# Patient Record
Sex: MALE | Race: WHITE | NOT HISPANIC OR LATINO | Employment: OTHER | URBAN - METROPOLITAN AREA
[De-identification: names, ages, dates, MRNs, and addresses within clinical notes are randomized per-mention and may not be internally consistent; named-entity substitution may affect disease eponyms.]

---

## 2024-09-03 ENCOUNTER — APPOINTMENT (OUTPATIENT)
Dept: RADIOLOGY | Facility: MEDICAL CENTER | Age: 84
DRG: 166 | End: 2024-09-03
Attending: EMERGENCY MEDICINE
Payer: MEDICARE

## 2024-09-03 ENCOUNTER — HOSPITAL ENCOUNTER (INPATIENT)
Facility: MEDICAL CENTER | Age: 84
LOS: 12 days | DRG: 166 | End: 2024-09-15
Attending: EMERGENCY MEDICINE | Admitting: HOSPITALIST
Payer: MEDICARE

## 2024-09-03 ENCOUNTER — HOSPITAL ENCOUNTER (OUTPATIENT)
Dept: RADIOLOGY | Facility: MEDICAL CENTER | Age: 84
End: 2024-09-03
Attending: STUDENT IN AN ORGANIZED HEALTH CARE EDUCATION/TRAINING PROGRAM
Payer: MEDICARE

## 2024-09-03 DIAGNOSIS — E86.0 DEHYDRATION: ICD-10-CM

## 2024-09-03 DIAGNOSIS — J93.11 PRIMARY SPONTANEOUS PNEUMOTHORAX: ICD-10-CM

## 2024-09-03 DIAGNOSIS — J43.9 PULMONARY EMPHYSEMA, UNSPECIFIED EMPHYSEMA TYPE (HCC): ICD-10-CM

## 2024-09-03 DIAGNOSIS — R73.9 HYPERGLYCEMIA: ICD-10-CM

## 2024-09-03 DIAGNOSIS — J93.9 PNEUMOTHORAX, UNSPECIFIED TYPE: ICD-10-CM

## 2024-09-03 DIAGNOSIS — R74.8 ELEVATED ALKALINE PHOSPHATASE LEVEL: ICD-10-CM

## 2024-09-03 DIAGNOSIS — J43.8 OTHER EMPHYSEMA (HCC): ICD-10-CM

## 2024-09-03 PROBLEM — E87.6 HYPOKALEMIA: Status: ACTIVE | Noted: 2024-09-03

## 2024-09-03 PROBLEM — J44.9 COPD (CHRONIC OBSTRUCTIVE PULMONARY DISEASE) (HCC): Status: ACTIVE | Noted: 2024-09-03

## 2024-09-03 PROBLEM — T14.90XA TRAUMA: Status: ACTIVE | Noted: 2024-09-03

## 2024-09-03 PROBLEM — D72.829 LEUKOCYTOSIS: Status: ACTIVE | Noted: 2024-09-03

## 2024-09-03 LAB
ALBUMIN SERPL BCP-MCNC: 4.1 G/DL (ref 3.2–4.9)
ALBUMIN/GLOB SERPL: 1.3 G/DL
ALP SERPL-CCNC: 178 U/L (ref 30–99)
ALT SERPL-CCNC: 14 U/L (ref 2–50)
ANION GAP SERPL CALC-SCNC: 14 MMOL/L (ref 7–16)
AST SERPL-CCNC: 21 U/L (ref 12–45)
BASOPHILS # BLD AUTO: 0.5 % (ref 0–1.8)
BASOPHILS # BLD: 0.11 K/UL (ref 0–0.12)
BILIRUB SERPL-MCNC: 0.4 MG/DL (ref 0.1–1.5)
BUN SERPL-MCNC: 12 MG/DL (ref 8–22)
CALCIUM ALBUM COR SERPL-MCNC: 8.5 MG/DL (ref 8.5–10.5)
CALCIUM SERPL-MCNC: 8.6 MG/DL (ref 8.5–10.5)
CHLORIDE SERPL-SCNC: 102 MMOL/L (ref 96–112)
CO2 SERPL-SCNC: 23 MMOL/L (ref 20–33)
CREAT SERPL-MCNC: 0.59 MG/DL (ref 0.5–1.4)
EOSINOPHIL # BLD AUTO: 0.07 K/UL (ref 0–0.51)
EOSINOPHIL NFR BLD: 0.3 % (ref 0–6.9)
ERYTHROCYTE [DISTWIDTH] IN BLOOD BY AUTOMATED COUNT: 49.3 FL (ref 35.9–50)
GFR SERPLBLD CREATININE-BSD FMLA CKD-EPI: 96 ML/MIN/1.73 M 2
GLOBULIN SER CALC-MCNC: 3.2 G/DL (ref 1.9–3.5)
GLUCOSE SERPL-MCNC: 178 MG/DL (ref 65–99)
HCT VFR BLD AUTO: 46 % (ref 42–52)
HGB BLD-MCNC: 14.9 G/DL (ref 14–18)
IMM GRANULOCYTES # BLD AUTO: 0.23 K/UL (ref 0–0.11)
IMM GRANULOCYTES NFR BLD AUTO: 1.1 % (ref 0–0.9)
LYMPHOCYTES # BLD AUTO: 2.89 K/UL (ref 1–4.8)
LYMPHOCYTES NFR BLD: 14 % (ref 22–41)
MCH RBC QN AUTO: 32.1 PG (ref 27–33)
MCHC RBC AUTO-ENTMCNC: 32.4 G/DL (ref 32.3–36.5)
MCV RBC AUTO: 99.1 FL (ref 81.4–97.8)
MONOCYTES # BLD AUTO: 0.6 K/UL (ref 0–0.85)
MONOCYTES NFR BLD AUTO: 2.9 % (ref 0–13.4)
NEUTROPHILS # BLD AUTO: 16.81 K/UL (ref 1.82–7.42)
NEUTROPHILS NFR BLD: 81.2 % (ref 44–72)
NRBC # BLD AUTO: 0 K/UL
NRBC BLD-RTO: 0 /100 WBC (ref 0–0.2)
PLATELET # BLD AUTO: 261 K/UL (ref 164–446)
PMV BLD AUTO: 10 FL (ref 9–12.9)
POTASSIUM SERPL-SCNC: 3.5 MMOL/L (ref 3.6–5.5)
PROCALCITONIN SERPL-MCNC: 7.28 NG/ML
PROT SERPL-MCNC: 7.3 G/DL (ref 6–8.2)
RBC # BLD AUTO: 4.64 M/UL (ref 4.7–6.1)
SODIUM SERPL-SCNC: 139 MMOL/L (ref 135–145)
TROPONIN T SERPL-MCNC: 19 NG/L (ref 6–19)
WBC # BLD AUTO: 20.7 K/UL (ref 4.8–10.8)

## 2024-09-03 PROCEDURE — 0W9930Z DRAINAGE OF RIGHT PLEURAL CAVITY WITH DRAINAGE DEVICE, PERCUTANEOUS APPROACH: ICD-10-PCS | Performed by: SURGERY

## 2024-09-03 PROCEDURE — 36415 COLL VENOUS BLD VENIPUNCTURE: CPT

## 2024-09-03 PROCEDURE — 770001 HCHG ROOM/CARE - MED/SURG/GYN PRIV*

## 2024-09-03 PROCEDURE — 71045 X-RAY EXAM CHEST 1 VIEW: CPT

## 2024-09-03 PROCEDURE — 3E0T3BZ INTRODUCTION OF ANESTHETIC AGENT INTO PERIPHERAL NERVES AND PLEXI, PERCUTANEOUS APPROACH: ICD-10-PCS | Performed by: SURGERY

## 2024-09-03 PROCEDURE — 700102 HCHG RX REV CODE 250 W/ 637 OVERRIDE(OP): Performed by: HOSPITALIST

## 2024-09-03 PROCEDURE — A9270 NON-COVERED ITEM OR SERVICE: HCPCS | Performed by: HOSPITALIST

## 2024-09-03 PROCEDURE — 32551 INSERTION OF CHEST TUBE: CPT

## 2024-09-03 PROCEDURE — 99285 EMERGENCY DEPT VISIT HI MDM: CPT

## 2024-09-03 PROCEDURE — 84484 ASSAY OF TROPONIN QUANT: CPT

## 2024-09-03 PROCEDURE — 700101 HCHG RX REV CODE 250: Performed by: EMERGENCY MEDICINE

## 2024-09-03 PROCEDURE — 32551 INSERTION OF CHEST TUBE: CPT | Mod: RT | Performed by: SURGERY

## 2024-09-03 PROCEDURE — 96375 TX/PRO/DX INJ NEW DRUG ADDON: CPT

## 2024-09-03 PROCEDURE — 99223 1ST HOSP IP/OBS HIGH 75: CPT | Mod: AI | Performed by: HOSPITALIST

## 2024-09-03 PROCEDURE — 700117 HCHG RX CONTRAST REV CODE 255: Performed by: EMERGENCY MEDICINE

## 2024-09-03 PROCEDURE — 71260 CT THORAX DX C+: CPT

## 2024-09-03 PROCEDURE — 80053 COMPREHEN METABOLIC PANEL: CPT

## 2024-09-03 PROCEDURE — 85025 COMPLETE CBC W/AUTO DIFF WBC: CPT

## 2024-09-03 PROCEDURE — 96374 THER/PROPH/DIAG INJ IV PUSH: CPT

## 2024-09-03 PROCEDURE — 84145 PROCALCITONIN (PCT): CPT

## 2024-09-03 PROCEDURE — 700111 HCHG RX REV CODE 636 W/ 250 OVERRIDE (IP): Performed by: SURGERY

## 2024-09-03 RX ORDER — OXYCODONE HYDROCHLORIDE 5 MG/1
5 TABLET ORAL
Status: DISCONTINUED | OUTPATIENT
Start: 2024-09-03 | End: 2024-09-04

## 2024-09-03 RX ORDER — ONDANSETRON 2 MG/ML
4 INJECTION INTRAMUSCULAR; INTRAVENOUS EVERY 4 HOURS PRN
Status: DISCONTINUED | OUTPATIENT
Start: 2024-09-03 | End: 2024-09-15 | Stop reason: HOSPADM

## 2024-09-03 RX ORDER — KETOROLAC TROMETHAMINE 5 MG/ML
1 SOLUTION OPHTHALMIC DAILY
Status: DISCONTINUED | OUTPATIENT
Start: 2024-09-04 | End: 2024-09-15 | Stop reason: HOSPADM

## 2024-09-03 RX ORDER — PREDNISOLONE ACETATE 10 MG/ML
1 SUSPENSION/ DROPS OPHTHALMIC
Status: ON HOLD | COMMUNITY
End: 2024-09-15

## 2024-09-03 RX ORDER — AMOXICILLIN 250 MG
2 CAPSULE ORAL EVERY EVENING
Status: DISCONTINUED | OUTPATIENT
Start: 2024-09-03 | End: 2024-09-15 | Stop reason: HOSPADM

## 2024-09-03 RX ORDER — PREDNISOLONE ACETATE 10 MG/ML
1 SUSPENSION/ DROPS OPHTHALMIC DAILY
Status: DISCONTINUED | OUTPATIENT
Start: 2024-09-04 | End: 2024-09-15 | Stop reason: HOSPADM

## 2024-09-03 RX ORDER — OXYCODONE HYDROCHLORIDE 5 MG/1
2.5 TABLET ORAL
Status: DISCONTINUED | OUTPATIENT
Start: 2024-09-03 | End: 2024-09-04

## 2024-09-03 RX ORDER — LIDOCAINE HYDROCHLORIDE AND EPINEPHRINE BITARTRATE 20; .01 MG/ML; MG/ML
10 INJECTION, SOLUTION SUBCUTANEOUS ONCE
Status: COMPLETED | OUTPATIENT
Start: 2024-09-03 | End: 2024-09-03

## 2024-09-03 RX ORDER — POLYETHYLENE GLYCOL 3350 17 G/17G
1 POWDER, FOR SOLUTION ORAL
Status: DISCONTINUED | OUTPATIENT
Start: 2024-09-03 | End: 2024-09-15 | Stop reason: HOSPADM

## 2024-09-03 RX ORDER — MORPHINE SULFATE 4 MG/ML
2 INJECTION INTRAVENOUS
Status: DISCONTINUED | OUTPATIENT
Start: 2024-09-03 | End: 2024-09-04

## 2024-09-03 RX ORDER — MIDAZOLAM HYDROCHLORIDE 1 MG/ML
INJECTION INTRAMUSCULAR; INTRAVENOUS
Status: DISPENSED
Start: 2024-09-03 | End: 2024-09-04

## 2024-09-03 RX ORDER — HYDRALAZINE HYDROCHLORIDE 20 MG/ML
10 INJECTION INTRAMUSCULAR; INTRAVENOUS EVERY 4 HOURS PRN
Status: DISCONTINUED | OUTPATIENT
Start: 2024-09-03 | End: 2024-09-15 | Stop reason: HOSPADM

## 2024-09-03 RX ORDER — MIDAZOLAM HYDROCHLORIDE 1 MG/ML
2 INJECTION INTRAMUSCULAR; INTRAVENOUS ONCE
Status: COMPLETED | OUTPATIENT
Start: 2024-09-03 | End: 2024-09-03

## 2024-09-03 RX ORDER — KETOROLAC TROMETHAMINE 5 MG/ML
1 SOLUTION OPHTHALMIC
Status: ON HOLD | COMMUNITY
End: 2024-09-15

## 2024-09-03 RX ORDER — ONDANSETRON 4 MG/1
4 TABLET, ORALLY DISINTEGRATING ORAL EVERY 4 HOURS PRN
Status: DISCONTINUED | OUTPATIENT
Start: 2024-09-03 | End: 2024-09-15 | Stop reason: HOSPADM

## 2024-09-03 RX ORDER — ACETAMINOPHEN 325 MG/1
650 TABLET ORAL EVERY 6 HOURS PRN
Status: DISCONTINUED | OUTPATIENT
Start: 2024-09-03 | End: 2024-09-09

## 2024-09-03 RX ORDER — POTASSIUM CHLORIDE 1500 MG/1
40 TABLET, EXTENDED RELEASE ORAL ONCE
Status: COMPLETED | OUTPATIENT
Start: 2024-09-03 | End: 2024-09-03

## 2024-09-03 RX ADMIN — IOHEXOL 80 ML: 350 INJECTION, SOLUTION INTRAVENOUS at 15:03

## 2024-09-03 RX ADMIN — SENNOSIDES AND DOCUSATE SODIUM 2 TABLET: 50; 8.6 TABLET ORAL at 19:57

## 2024-09-03 RX ADMIN — FENTANYL CITRATE 25 MCG: 50 INJECTION, SOLUTION INTRAMUSCULAR; INTRAVENOUS at 12:25

## 2024-09-03 RX ADMIN — RIVAROXABAN 10 MG: 10 TABLET, FILM COATED ORAL at 19:57

## 2024-09-03 RX ADMIN — LIDOCAINE HYDROCHLORIDE,EPINEPHRINE BITARTRATE 5 ML: 20; .01 INJECTION, SOLUTION INFILTRATION; PERINEURAL at 12:15

## 2024-09-03 RX ADMIN — POTASSIUM CHLORIDE 40 MEQ: 1500 TABLET, EXTENDED RELEASE ORAL at 19:57

## 2024-09-03 RX ADMIN — MIDAZOLAM HYDROCHLORIDE 2 MG: 2 INJECTION, SOLUTION INTRAMUSCULAR; INTRAVENOUS at 12:25

## 2024-09-03 ASSESSMENT — LIFESTYLE VARIABLES
TOTAL SCORE: 0
HAVE YOU EVER FELT YOU SHOULD CUT DOWN ON YOUR DRINKING: NO
CONSUMPTION TOTAL: NEGATIVE
ON A TYPICAL DAY WHEN YOU DRINK ALCOHOL HOW MANY DRINKS DO YOU HAVE: 0
DOES PATIENT WANT TO STOP DRINKING: NO
EVER HAD A DRINK FIRST THING IN THE MORNING TO STEADY YOUR NERVES TO GET RID OF A HANGOVER: NO
HOW MANY TIMES IN THE PAST YEAR HAVE YOU HAD 5 OR MORE DRINKS IN A DAY: 0
HAVE PEOPLE ANNOYED YOU BY CRITICIZING YOUR DRINKING: NO
ALCOHOL_USE: NO
AVERAGE NUMBER OF DAYS PER WEEK YOU HAVE A DRINK CONTAINING ALCOHOL: 0
EVER FELT BAD OR GUILTY ABOUT YOUR DRINKING: NO

## 2024-09-03 ASSESSMENT — PAIN DESCRIPTION - PAIN TYPE
TYPE: ACUTE PAIN
TYPE: ACUTE PAIN

## 2024-09-03 ASSESSMENT — COGNITIVE AND FUNCTIONAL STATUS - GENERAL
MOBILITY SCORE: 22
MOVING TO AND FROM BED TO CHAIR: A LITTLE
SUGGESTED CMS G CODE MODIFIER DAILY ACTIVITY: CH
SUGGESTED CMS G CODE MODIFIER MOBILITY: CJ
WALKING IN HOSPITAL ROOM: A LITTLE
DAILY ACTIVITIY SCORE: 24

## 2024-09-03 ASSESSMENT — SOCIAL DETERMINANTS OF HEALTH (SDOH)
WITHIN THE LAST YEAR, HAVE YOU BEEN KICKED, HIT, SLAPPED, OR OTHERWISE PHYSICALLY HURT BY YOUR PARTNER OR EX-PARTNER?: PATIENT DECLINED
WITHIN THE LAST YEAR, HAVE TO BEEN RAPED OR FORCED TO HAVE ANY KIND OF SEXUAL ACTIVITY BY YOUR PARTNER OR EX-PARTNER?: PATIENT DECLINED
WITHIN THE LAST YEAR, HAVE YOU BEEN AFRAID OF YOUR PARTNER OR EX-PARTNER?: PATIENT DECLINED
WITHIN THE LAST YEAR, HAVE YOU BEEN HUMILIATED OR EMOTIONALLY ABUSED IN OTHER WAYS BY YOUR PARTNER OR EX-PARTNER?: PATIENT DECLINED

## 2024-09-03 ASSESSMENT — ENCOUNTER SYMPTOMS
DIZZINESS: 0
FEVER: 0
HEMOPTYSIS: 0
SORE THROAT: 0
WEIGHT LOSS: 0
SPUTUM PRODUCTION: 0
NERVOUS/ANXIOUS: 0
BLOOD IN STOOL: 0
COUGH: 0
DIARRHEA: 0
SHORTNESS OF BREATH: 1
PALPITATIONS: 0
BACK PAIN: 1
NAUSEA: 0

## 2024-09-03 ASSESSMENT — PATIENT HEALTH QUESTIONNAIRE - PHQ9
2. FEELING DOWN, DEPRESSED, IRRITABLE, OR HOPELESS: NOT AT ALL
1. LITTLE INTEREST OR PLEASURE IN DOING THINGS: NOT AT ALL
SUM OF ALL RESPONSES TO PHQ9 QUESTIONS 1 AND 2: 0

## 2024-09-03 NOTE — ED NOTES
Pt assisted to standing voided by urinal. Chest tube with scant blood in drainage tube. Air leak no longer present. Pt o2 95 4l nc

## 2024-09-03 NOTE — ED TRIAGE NOTES
.  Chief Complaint   Patient presents with    Shortness of Breath    Respiratory Distress     Pt transferred from [facility]. Via macias born ems. Pt on arrival increased wob, unable to answer questions d/t work of breathing. O2 98% 15l nrb. ERP paged to room.    Sweta from [facility] Pre-Surgical testing following up on device form that was faxed over regarding patient's surgery for tomorrow 4/5/22.     Contact Info:   Sweta   [facility] Pre-Surgical Testing   [phone]  Fax:[phone]

## 2024-09-03 NOTE — ED NOTES
Med Rec complete per patient with family at bedside   Allergies reviewed  Antibiotics in the past 30 days:no  Anticoagulant in past 14 days:no  Pharmacy patient utilizes:[facility] in [place]    Pt unable to verify names of eye drops    Per pharmacy the only medications filled for patient was on December of 2023 for Ketoralac 0.5% (1 gtt TID) and Prednisolone 1% (1 gtt Q8H prior to surgery)    Notified [facility]

## 2024-09-03 NOTE — ED TRIAGE NOTES
X ray to bedside erp to place emergent needle decompression to right chest 5th intercostal axilla xray to bedside. Additional decompression to just above space and repeat xray done

## 2024-09-03 NOTE — ED NOTES
Needle decompression to right chest mud clavicular second inter costal. Erp hand pulled 100 ml air

## 2024-09-03 NOTE — ASSESSMENT & PLAN NOTE
Large spontaneous pneumothorax   Attempted decompression in ED.  Chest tube placed in ED. (+) air leak noted/no drainage. Placed to suction.  9/4 CXR with no pneumothorax.  CT to suction with no air leak.  Continue chest tube to suction.    9/5 OR for thoracoscopic talc and abrasive pleurodesis. Bleb resection not indicated.   9/9 Chest tube to water seal. No pneumothorax on morning CXR.  9/10 Small 9mm pneumothorax.  -Place chest tube back to suction.   -PEP therapy.  9/12 Chest tube to water seal.   9/14 Chest tube removed.   - AM CXR for medical clearance to discharge.   Trauma service will sign off.

## 2024-09-04 ENCOUNTER — APPOINTMENT (OUTPATIENT)
Dept: RADIOLOGY | Facility: MEDICAL CENTER | Age: 84
DRG: 166 | End: 2024-09-04
Attending: HOSPITALIST
Payer: MEDICARE

## 2024-09-04 ENCOUNTER — ANESTHESIA EVENT (OUTPATIENT)
Dept: SURGERY | Facility: MEDICAL CENTER | Age: 84
DRG: 166 | End: 2024-09-04
Payer: MEDICARE

## 2024-09-04 PROBLEM — E86.0 DEHYDRATION: Status: ACTIVE | Noted: 2024-09-04

## 2024-09-04 LAB
ANION GAP SERPL CALC-SCNC: 11 MMOL/L (ref 7–16)
BUN SERPL-MCNC: 11 MG/DL (ref 8–22)
CALCIUM SERPL-MCNC: 9.4 MG/DL (ref 8.5–10.5)
CHLORIDE SERPL-SCNC: 106 MMOL/L (ref 96–112)
CO2 SERPL-SCNC: 24 MMOL/L (ref 20–33)
CREAT SERPL-MCNC: 0.45 MG/DL (ref 0.5–1.4)
EKG IMPRESSION: NORMAL
ERYTHROCYTE [DISTWIDTH] IN BLOOD BY AUTOMATED COUNT: 48.6 FL (ref 35.9–50)
GFR SERPLBLD CREATININE-BSD FMLA CKD-EPI: 104 ML/MIN/1.73 M 2
GLUCOSE SERPL-MCNC: 110 MG/DL (ref 65–99)
HCT VFR BLD AUTO: 42.2 % (ref 42–52)
HGB BLD-MCNC: 13.8 G/DL (ref 14–18)
MAGNESIUM SERPL-MCNC: 1.8 MG/DL (ref 1.5–2.5)
MCH RBC QN AUTO: 31.8 PG (ref 27–33)
MCHC RBC AUTO-ENTMCNC: 32.7 G/DL (ref 32.3–36.5)
MCV RBC AUTO: 97.2 FL (ref 81.4–97.8)
PLATELET # BLD AUTO: 247 K/UL (ref 164–446)
PMV BLD AUTO: 10.1 FL (ref 9–12.9)
POTASSIUM SERPL-SCNC: 4.7 MMOL/L (ref 3.6–5.5)
RBC # BLD AUTO: 4.34 M/UL (ref 4.7–6.1)
SODIUM SERPL-SCNC: 141 MMOL/L (ref 135–145)
WBC # BLD AUTO: 12.5 K/UL (ref 4.8–10.8)

## 2024-09-04 PROCEDURE — 36415 COLL VENOUS BLD VENIPUNCTURE: CPT

## 2024-09-04 PROCEDURE — 700102 HCHG RX REV CODE 250 W/ 637 OVERRIDE(OP): Performed by: HOSPITALIST

## 2024-09-04 PROCEDURE — 99233 SBSQ HOSP IP/OBS HIGH 50: CPT | Performed by: HOSPITALIST

## 2024-09-04 PROCEDURE — 770001 HCHG ROOM/CARE - MED/SURG/GYN PRIV*

## 2024-09-04 PROCEDURE — 700101 HCHG RX REV CODE 250: Performed by: HOSPITALIST

## 2024-09-04 PROCEDURE — 80048 BASIC METABOLIC PNL TOTAL CA: CPT

## 2024-09-04 PROCEDURE — 85027 COMPLETE CBC AUTOMATED: CPT

## 2024-09-04 PROCEDURE — A9270 NON-COVERED ITEM OR SERVICE: HCPCS | Performed by: HOSPITALIST

## 2024-09-04 PROCEDURE — 93010 ELECTROCARDIOGRAM REPORT: CPT | Performed by: INTERNAL MEDICINE

## 2024-09-04 PROCEDURE — 700111 HCHG RX REV CODE 636 W/ 250 OVERRIDE (IP): Mod: JZ | Performed by: HOSPITALIST

## 2024-09-04 PROCEDURE — 700111 HCHG RX REV CODE 636 W/ 250 OVERRIDE (IP): Performed by: HOSPITALIST

## 2024-09-04 PROCEDURE — 83735 ASSAY OF MAGNESIUM: CPT

## 2024-09-04 PROCEDURE — 93005 ELECTROCARDIOGRAM TRACING: CPT | Performed by: ANESTHESIOLOGY

## 2024-09-04 PROCEDURE — 700105 HCHG RX REV CODE 258: Performed by: HOSPITALIST

## 2024-09-04 PROCEDURE — 71045 X-RAY EXAM CHEST 1 VIEW: CPT

## 2024-09-04 PROCEDURE — 94669 MECHANICAL CHEST WALL OSCILL: CPT

## 2024-09-04 RX ORDER — OXYCODONE HYDROCHLORIDE 10 MG/1
10 TABLET ORAL
Status: DISCONTINUED | OUTPATIENT
Start: 2024-09-04 | End: 2024-09-07

## 2024-09-04 RX ORDER — MORPHINE SULFATE 4 MG/ML
4 INJECTION INTRAVENOUS
Status: DISCONTINUED | OUTPATIENT
Start: 2024-09-04 | End: 2024-09-07

## 2024-09-04 RX ORDER — SODIUM CHLORIDE, SODIUM LACTATE, POTASSIUM CHLORIDE, CALCIUM CHLORIDE 600; 310; 30; 20 MG/100ML; MG/100ML; MG/100ML; MG/100ML
INJECTION, SOLUTION INTRAVENOUS CONTINUOUS
Status: DISCONTINUED | OUTPATIENT
Start: 2024-09-04 | End: 2024-09-06

## 2024-09-04 RX ORDER — OXYCODONE HYDROCHLORIDE 5 MG/1
5 TABLET ORAL
Status: DISCONTINUED | OUTPATIENT
Start: 2024-09-04 | End: 2024-09-07

## 2024-09-04 RX ADMIN — ONDANSETRON 4 MG: 2 INJECTION INTRAMUSCULAR; INTRAVENOUS at 18:50

## 2024-09-04 RX ADMIN — OXYCODONE HYDROCHLORIDE 2.5 MG: 5 TABLET ORAL at 11:49

## 2024-09-04 RX ADMIN — RIVAROXABAN 10 MG: 10 TABLET, FILM COATED ORAL at 16:55

## 2024-09-04 RX ADMIN — MORPHINE SULFATE 2 MG: 4 INJECTION, SOLUTION INTRAMUSCULAR; INTRAVENOUS at 13:47

## 2024-09-04 RX ADMIN — OXYCODONE HYDROCHLORIDE 5 MG: 5 TABLET ORAL at 16:59

## 2024-09-04 RX ADMIN — MORPHINE SULFATE 4 MG: 4 INJECTION, SOLUTION INTRAMUSCULAR; INTRAVENOUS at 18:15

## 2024-09-04 RX ADMIN — SENNOSIDES AND DOCUSATE SODIUM 2 TABLET: 50; 8.6 TABLET ORAL at 16:55

## 2024-09-04 RX ADMIN — KETOROLAC TROMETHAMINE 1 DROP: 0.5 SOLUTION OPHTHALMIC at 05:55

## 2024-09-04 RX ADMIN — PREDNISOLONE ACETATE 1 DROP: 10 SUSPENSION/ DROPS OPHTHALMIC at 05:55

## 2024-09-04 RX ADMIN — SODIUM CHLORIDE, POTASSIUM CHLORIDE, SODIUM LACTATE AND CALCIUM CHLORIDE: 600; 310; 30; 20 INJECTION, SOLUTION INTRAVENOUS at 18:11

## 2024-09-04 SDOH — ECONOMIC STABILITY: TRANSPORTATION INSECURITY
IN THE PAST 12 MONTHS, HAS THE LACK OF TRANSPORTATION KEPT YOU FROM MEDICAL APPOINTMENTS OR FROM GETTING MEDICATIONS?: PATIENT DECLINED

## 2024-09-04 SDOH — ECONOMIC STABILITY: TRANSPORTATION INSECURITY
IN THE PAST 12 MONTHS, HAS LACK OF RELIABLE TRANSPORTATION KEPT YOU FROM MEDICAL APPOINTMENTS, MEETINGS, WORK OR FROM GETTING THINGS NEEDED FOR DAILY LIVING?: PATIENT DECLINED

## 2024-09-04 ASSESSMENT — ENCOUNTER SYMPTOMS
SPUTUM PRODUCTION: 0
WEAKNESS: 0
COUGH: 0
EYE DISCHARGE: 0
CONSTIPATION: 0
FOCAL WEAKNESS: 0
DIZZINESS: 0
MYALGIAS: 0
ABDOMINAL PAIN: 0
SPEECH CHANGE: 0
EYE PAIN: 0
SORE THROAT: 0
PALPITATIONS: 0
WHEEZING: 0
HEMOPTYSIS: 0
CLAUDICATION: 0
LOSS OF CONSCIOUSNESS: 0
NAUSEA: 0
GASTROINTESTINAL NEGATIVE: 1
FEVER: 0
DIARRHEA: 0
HEADACHES: 0
BRUISES/BLEEDS EASILY: 0
BACK PAIN: 0
DEPRESSION: 0
CARDIOVASCULAR NEGATIVE: 1
VOMITING: 0
SENSORY CHANGE: 0
NECK PAIN: 0
NEUROLOGICAL NEGATIVE: 1
SHORTNESS OF BREATH: 0
DIAPHORESIS: 0
CHILLS: 0
MYALGIAS: 1
EYES NEGATIVE: 1

## 2024-09-04 ASSESSMENT — PAIN DESCRIPTION - PAIN TYPE
TYPE: ACUTE PAIN

## 2024-09-04 ASSESSMENT — COPD QUESTIONNAIRES
COPD SCREENING SCORE: 7
HAVE YOU SMOKED AT LEAST 100 CIGARETTES IN YOUR ENTIRE LIFE: YES
DURING THE PAST 4 WEEKS HOW MUCH DID YOU FEEL SHORT OF BREATH: SOME OF THE TIME
DO YOU EVER COUGH UP ANY MUCUS OR PHLEGM?: YES, A FEW DAYS A WEEK OR MONTH

## 2024-09-04 ASSESSMENT — SOCIAL DETERMINANTS OF HEALTH (SDOH)
WITHIN THE PAST 12 MONTHS, THE FOOD YOU BOUGHT JUST DIDN'T LAST AND YOU DIDN'T HAVE MONEY TO GET MORE: PATIENT DECLINED
IN THE PAST 12 MONTHS, HAS THE ELECTRIC, GAS, OIL, OR WATER COMPANY THREATENED TO SHUT OFF SERVICE IN YOUR HOME?: PATIENT DECLINED
WITHIN THE PAST 12 MONTHS, YOU WORRIED THAT YOUR FOOD WOULD RUN OUT BEFORE YOU GOT THE MONEY TO BUY MORE: PATIENT DECLINED

## 2024-09-04 ASSESSMENT — LIFESTYLE VARIABLES: SUBSTANCE_ABUSE: 0

## 2024-09-04 NOTE — ED NOTES
Bedside report received from off going RN/tech: Katty, assumed care of patient.  POC discussed with patient. Patient given urinal. Call light within reach, all needs addressed at this time.       Fall risk interventions in place: Move the patient closer to the nurse's station, Patient's personal possessions are with in their safe reach, Place fall risk sign on patient's door, Give patient urinal if applicable, and Keep floor surfaces clean and dry (all applicable per [place] Fall risk assessment)   Continuous monitoring: Cardiac Leads, Pulse Ox, or Blood Pressure  IVF/IV medications: N/A  Oxygen: How many liters 4L, Traced the line to wall oxygen, and No oxygen tank in room  Bedside sitter: Not Applicable   Isolation: Not Applicable    BP (!) 152/66   Pulse 68   Temp 36.6 °C (97.9 °F) (Temporal)   Resp 20   Wt 56.7 kg (125 lb)   SpO2 93%     Labs

## 2024-09-04 NOTE — ASSESSMENT & PLAN NOTE
No exacerbation  RT per protocol  Monitor SpO2 and titrate oxygen to keep >89%  Nebulizer and inhalers

## 2024-09-04 NOTE — H&P
Hospital Medicine History & Physical Note    Date of Service  9/3/2024    Primary Care Physician  No primary care provider on file.    Consultants  general surgery    Specialist Names: Dr Demetrius Mccray.    Code Status  Full Code    Chief Complaint  Chief Complaint   Patient presents with    Shortness of Breath    Respiratory Distress       History of Presenting Illness  Joaquín Hartman is a Congolese speaking 84 y.o. male w/ a hx of COPD and prior smoker who presented 9/3/2024 with acute shortness of breath to a medical facility in [facility].  He was found to have a right sided large pneumothorax and medically air flown to [facility].  Here he received an emergent chest tube with return of right sided lung appearance on follow up xray.  He denies recent trauma.     I discussed the plan of care with patient.    Review of Systems  Review of Systems   Constitutional:  Negative for fever and weight loss.   HENT:  Negative for sore throat.    Respiratory:  Positive for shortness of breath. Negative for cough, hemoptysis and sputum production.    Cardiovascular:  Negative for chest pain, palpitations and leg swelling.   Gastrointestinal:  Negative for blood in stool, diarrhea, melena and nausea.   Genitourinary:  Negative for dysuria.   Musculoskeletal:  Positive for back pain (right upper back).   Neurological:  Negative for dizziness.   Psychiatric/Behavioral:  The patient is not nervous/anxious.        Past Medical History  No medical issues per family    Surgical History  Eye surgery     Family History  Parents    Family history reviewed with patient. There is no family history that is pertinent to the chief complaint.     Social History   . Has 7 children. Quit smoking a few years ago.  No alcohol. Worked in agriculture.    Allergies  Not on File    Medications  Prior to Admission Medications   Prescriptions Last Dose Informant Patient Reported? Taking?   ketorolac (ACULAR) 0.5 % Solution 9/3/2024 at AM Patient  Yes Yes   Sig: Administer 1 Drop into both eyes every day.   prednisoLONE acetate (PRED FORTE) 1 % Suspension 9/3/2024 at AM Patient Yes Yes   Sig: Administer 1 Drop into both eyes every day.      Facility-Administered Medications: None       Physical Exam  Temp:  [36.6 °C (97.9 °F)] 36.6 °C (97.9 °F)  Pulse:  [] 68  Resp:  [14-31] 24  BP: ()/(51-82) 122/59  SpO2:  [88 %-98 %] 96 %  Blood Pressure : 122/59   Temperature: 36.6 °C (97.9 °F)   Pulse: 68   Respiration: (!) 24   Pulse Oximetry: 96 %       Physical Exam  Vitals reviewed.   Constitutional:       Appearance: He is underweight. He is ill-appearing.   HENT:      Head: Normocephalic and atraumatic.      Nose: Nose normal.      Mouth/Throat:      Mouth: Mucous membranes are moist.   Eyes:      Extraocular Movements: Extraocular movements intact.   Cardiovascular:      Rate and Rhythm: Normal rate and regular rhythm.      Pulses: Normal pulses.   Pulmonary:      Effort: No respiratory distress.      Breath sounds: No rhonchi.      Comments: Right sited chest tube with clean dressing over insertion site. No air leak detected on deep inspiratory effort.  Abdominal:      General: Bowel sounds are normal. There is no distension.      Palpations: Abdomen is soft.   Musculoskeletal:      Right lower leg: No edema.      Left lower leg: No edema.      Comments: Atrophy of musculature   Lymphadenopathy:      Cervical: No cervical adenopathy.   Skin:     General: Skin is dry.   Neurological:      General: No focal deficit present.      Mental Status: He is alert and oriented to person, place, and time.   Psychiatric:         Mood and Affect: Mood normal.         Behavior: Behavior normal.         Laboratory:  Recent Labs     09/03/24  1145   WBC 20.7*   RBC 4.64*   HEMOGLOBIN 14.9   HEMATOCRIT 46.0   MCV 99.1*   MCH 32.1   MCHC 32.4   RDW 49.3   PLATELETCT 261   MPV 10.0     Recent Labs     09/03/24  1145   SODIUM 139   POTASSIUM 3.5*   CHLORIDE 102   CO2 23  "  GLUCOSE 178*   BUN 12   CREATININE 0.59   CALCIUM 8.6     Recent Labs     09/03/24  1145   ALTSGPT 14   ASTSGOT 21   ALKPHOSPHAT 178*   TBILIRUBIN 0.4   GLUCOSE 178*         No results for input(s): \"NTPROBNP\" in the last 72 hours.      Recent Labs     09/03/24  1145   TROPONINT 19       Imaging:  CT-CHEST (THORAX) WITH   Final Result         1. Interval placement of right chest tube with small residual right pneumothorax.      2. Emphysematous changes with large bullae in the right middle lobe.   3. Patchy right basilar atelectasis.         DX-CHEST-PORTABLE (1 VIEW)   Final Result         No appreciable pneumothorax after right chest tube placement.      DX-CHEST-PORTABLE (1 VIEW)   Final Result      Stable large right pneumothorax.      DX-CHEST-PORTABLE (1 VIEW)   Final Result         Large right pneumothorax with collapsing of the right lung.            CRITICAL RESULT READ BACK: Preliminary findings discussed with and critical read back performed by Dr. VALERIA BOLANOS in the Emergency Department via telephone on 9/3/2024 12:08 PM            Assessment/Plan:  Justification for Admission Status  I anticipate this patient will require at least two midnights for appropriate medical management, necessitating inpatient admission because of management of chest tube in face of large spontaneous pneumothorax    Patient will need a Med/Surg bed on MEDICAL service .  The need is secondary to .    * Pneumothorax on right- (present on admission)  Assessment & Plan  Spontaneous likely due to bleb from emphysema and prior years of smoking.  S/P chest tube.  Monitor pleurovac for air leak  Supplemental oxygen to keep SpO2 >89%  RT per protocol    Hyperglycemia  Assessment & Plan  Monitor labs    Elevated alkaline phosphatase level  Assessment & Plan  Alk Phos:178  Needs outpatient work up.  No current abdominal pain.    Hypokalemia  Assessment & Plan  9/3 K:3.5  Supplement and monitor  Check Mg    COPD (chronic obstructive " pulmonary disease) (HCC)  Assessment & Plan  No exacerbation  RT per protocol  Monitor SpO2 and titrate oxygen to keep >89%  Nebulizer and inhalers    Leukocytosis  Assessment & Plan  Likely leukemoid reaction  Monitor am CBC and vitals.  Hold antibiotics for now.    Primary spontaneous pneumothorax- (present on admission)  Assessment & Plan  Spontaneous likely due to bleb from emphysema and prior years of smoking.  S/P chest tube.  Monitor pleurovac for air leak  Supplemental oxygen to keep SpO2 >89%  RT per protocol   Surgery consulting         VTE prophylaxis: SCDs/TEDs and Xarelto 10 mg daily as prophylaxis

## 2024-09-04 NOTE — ED NOTES
Patient medicated per MAR, tolerated small pills well individually with sips of water. Potassium pills dissolved in water per patient request.

## 2024-09-04 NOTE — ASSESSMENT & PLAN NOTE
Spontaneous likely due to bleb from emphysema and prior years of smoking.  S/P chest tube.  Monitor pleurovac for air leak  Supplemental oxygen to keep SpO2 >89%  RT per protocol

## 2024-09-04 NOTE — PROGRESS NOTES
Trauma / Surgical Daily Progress Note    Date of Service  9/4/2024    Chief Complaint  84 y.o. male admitted 9/3/2024 with spontaneous pneumothorax.  Non-trauma.      9/3 Right tube thoracostomy.     Interval Events    Chest tube to suction with no air leak.    AM CXR with no pneumothorax.  IS 1000 cc.  Xarelto.    -Continue chest tube to suction.  A.m. chest x-ray.  Plan Thoracoscopic pleurodesis Thursday     Review of Systems  Review of Systems   Constitutional:  Negative for chills and fever.   HENT: Negative.     Eyes: Negative.    Respiratory:          Pain with deep inspiration   Cardiovascular: Negative.    Gastrointestinal: Negative.    Genitourinary: Negative.    Musculoskeletal:  Positive for myalgias.   Skin: Negative.    Neurological: Negative.         Vital Signs  Temp:  [36.2 °C (97.2 °F)-36.8 °C (98.2 °F)] 36.6 °C (97.9 °F)  Pulse:  [] 73  Resp:  [14-31] 18  BP: ()/(51-82) 128/68  SpO2:  [88 %-98 %] 91 %    Physical Exam  Physical Exam  Vitals and nursing note reviewed.   Constitutional:       General: He is awake. He is not in acute distress.     Appearance: He is not ill-appearing, toxic-appearing or diaphoretic.   HENT:      Head: Normocephalic and atraumatic.      Nose: Nose normal.      Mouth/Throat:      Mouth: Mucous membranes are moist.   Eyes:      Conjunctiva/sclera: Conjunctivae normal.   Cardiovascular:      Rate and Rhythm: Normal rate.   Pulmonary:      Effort: No respiratory distress.      Comments: Chest tube to suction with no air leak.  Scant output.  Chest:      Chest wall: Tenderness present.   Abdominal:      General: There is no distension.      Tenderness: There is no abdominal tenderness. There is no guarding or rebound.   Musculoskeletal:      Cervical back: Neck supple.      Comments: Moves all extremities   Skin:     General: Skin is warm and dry.      Capillary Refill: Capillary refill takes less than 2 seconds.   Neurological:      Mental Status: He is alert.       Comments: Conversant   Psychiatric:         Mood and Affect: Mood normal.         Behavior: Behavior is cooperative.         Laboratory  Recent Results (from the past 24 hour(s))   CBC WITH DIFFERENTIAL    Collection Time: 09/03/24 11:45 AM   Result Value Ref Range    WBC 20.7 (H) 4.8 - 10.8 K/uL    RBC 4.64 (L) 4.70 - 6.10 M/uL    Hemoglobin 14.9 14.0 - 18.0 g/dL    Hematocrit 46.0 42.0 - 52.0 %    MCV 99.1 (H) 81.4 - 97.8 fL    MCH 32.1 27.0 - 33.0 pg    MCHC 32.4 32.3 - 36.5 g/dL    RDW 49.3 35.9 - 50.0 fL    Platelet Count 261 164 - 446 K/uL    MPV 10.0 9.0 - 12.9 fL    Neutrophils-Polys 81.20 (H) 44.00 - 72.00 %    Lymphocytes 14.00 (L) 22.00 - 41.00 %    Monocytes 2.90 0.00 - 13.40 %    Eosinophils 0.30 0.00 - 6.90 %    Basophils 0.50 0.00 - 1.80 %    Immature Granulocytes 1.10 (H) 0.00 - 0.90 %    Nucleated RBC 0.00 0.00 - 0.20 /100 WBC    Neutrophils (Absolute) 16.81 (H) 1.82 - 7.42 K/uL    Lymphs (Absolute) 2.89 1.00 - 4.80 K/uL    Monos (Absolute) 0.60 0.00 - 0.85 K/uL    Eos (Absolute) 0.07 0.00 - 0.51 K/uL    Baso (Absolute) 0.11 0.00 - 0.12 K/uL    Immature Granulocytes (abs) 0.23 (H) 0.00 - 0.11 K/uL    NRBC (Absolute) 0.00 K/uL   CMP    Collection Time: 09/03/24 11:45 AM   Result Value Ref Range    Sodium 139 135 - 145 mmol/L    Potassium 3.5 (L) 3.6 - 5.5 mmol/L    Chloride 102 96 - 112 mmol/L    Co2 23 20 - 33 mmol/L    Anion Gap 14.0 7.0 - 16.0    Glucose 178 (H) 65 - 99 mg/dL    Bun 12 8 - 22 mg/dL    Creatinine 0.59 0.50 - 1.40 mg/dL    Calcium 8.6 8.5 - 10.5 mg/dL    Correct Calcium 8.5 8.5 - 10.5 mg/dL    AST(SGOT) 21 12 - 45 U/L    ALT(SGPT) 14 2 - 50 U/L    Alkaline Phosphatase 178 (H) 30 - 99 U/L    Total Bilirubin 0.4 0.1 - 1.5 mg/dL    Albumin 4.1 3.2 - 4.9 g/dL    Total Protein 7.3 6.0 - 8.2 g/dL    Globulin 3.2 1.9 - 3.5 g/dL    A-G Ratio 1.3 g/dL   TROPONIN    Collection Time: 09/03/24 11:45 AM   Result Value Ref Range    Troponin T 19 6 - 19 ng/L   ESTIMATED GFR    Collection  Time: 09/03/24 11:45 AM   Result Value Ref Range    GFR (CKD-EPI) 96 >60 mL/min/1.73 m 2   PROCALCITONIN    Collection Time: 09/03/24 11:45 AM   Result Value Ref Range    Procalcitonin 7.28 (H) <0.25 ng/mL   CBC without Differential    Collection Time: 09/04/24  2:48 AM   Result Value Ref Range    WBC 12.5 (H) 4.8 - 10.8 K/uL    RBC 4.34 (L) 4.70 - 6.10 M/uL    Hemoglobin 13.8 (L) 14.0 - 18.0 g/dL    Hematocrit 42.2 42.0 - 52.0 %    MCV 97.2 81.4 - 97.8 fL    MCH 31.8 27.0 - 33.0 pg    MCHC 32.7 32.3 - 36.5 g/dL    RDW 48.6 35.9 - 50.0 fL    Platelet Count 247 164 - 446 K/uL    MPV 10.1 9.0 - 12.9 fL   Basic Metabolic Panel (BMP)    Collection Time: 09/04/24  2:48 AM   Result Value Ref Range    Sodium 141 135 - 145 mmol/L    Potassium 4.7 3.6 - 5.5 mmol/L    Chloride 106 96 - 112 mmol/L    Co2 24 20 - 33 mmol/L    Glucose 110 (H) 65 - 99 mg/dL    Bun 11 8 - 22 mg/dL    Creatinine 0.45 (L) 0.50 - 1.40 mg/dL    Calcium 9.4 8.5 - 10.5 mg/dL    Anion Gap 11.0 7.0 - 16.0   MAGNESIUM    Collection Time: 09/04/24  2:48 AM   Result Value Ref Range    Magnesium 1.8 1.5 - 2.5 mg/dL   ESTIMATED GFR    Collection Time: 09/04/24  2:48 AM   Result Value Ref Range    GFR (CKD-EPI) 104 >60 mL/min/1.73 m 2       Fluids    Intake/Output Summary (Last 24 hours) at 9/4/2024 0838  Last data filed at 9/4/2024 0813  Gross per 24 hour   Intake --   Output 2 ml   Net -2 ml       Core Measures & Quality Metrics  Labs reviewed, Medications reviewed and Radiology images reviewed  Frias catheter: No Frias      DVT: Xarelto.    Ulcer prophylaxis: Not indicated          Assessment/Plan  Primary spontaneous pneumothorax- (present on admission)  Assessment & Plan  Large spontaneous pneumothorax   Attempted decompression in ED.  Chest tube placed in ED. (+) air leak noted/no drainage. Placed to suction.  9/4 CXR with no pneumothorax.  CT to suction with no air leak.  Continue chest tube to suction.    Daily chest xray's    Trauma- (present on  admission)  Assessment & Plan  Denies trauma    Plan:  Thoracoscopy/pleurodesis    Discussed patient condition with Patient and trauma surgery, Dr. Demetrius Mccray.

## 2024-09-04 NOTE — CONSULTS
TRAUMA HISTORY AND PHYSICAL    CHIEF COMPLAINT: Pneumothorax    HISTORY OF PRESENT ILLNESS: The patient is a  84 year old  male who was transferred here for spontaneous pneumothorax. A chest intra catheter  was placed PTA. Urgent consult request for chest tube.  No prior episodes.  Denies smoking history.   Continuous air leak following chest tube placement.  CT shows emphysematous changes with large bullae middle lobe.        PAST MEDICAL HISTORY:       PAST SURGICAL HISTORY: patient denies any surgical history     ALLERGIES: Not on File     CURRENT MEDICATIONS:   Home Medications       Reviewed by Raymond Rodgers (Pharmacy Tech) on 24 at 1626  Med List Status: Complete     Medication Last Dose Status   ketorolac (ACULAR) 0.5 % Solution 9/3/2024 Active   prednisoLONE acetate (PRED FORTE) 1 % Suspension 9/3/2024 Active                  Audit from Redirected Encounters    **Home medications have not yet been reviewed for this encounter**         FAMILY HISTORY: No family history on file.     SOCIAL HISTORY:   Social History     Tobacco Use    Smoking status: Former     Types: Cigarettes     Start date:      Quit date: 1975     Years since quittin.7    Smokeless tobacco: Not on file   Substance and Sexual Activity    Alcohol use: Not on file    Drug use: Not on file    Sexual activity: Not on file       REVIEW OF SYSTEMS: Comprehensive review of systems is not possible , acuity  PHYSICAL EXAMINATION:     GENERAL: No distress  VITAL SIGNS: /68   Pulse 73   Temp 36.6 °C (97.9 °F) (Temporal)   Resp 18   Wt 56.7 kg (125 lb)   SpO2 91%   Facial:  Symmetrical.    NECK: No JVD. Trachea midline.  CHEST: Breath sounds are decreased , right  CARDIOVASCULAR: Regular rhythm  ABDOMEN: Soft, no tenderness   EXTREMITIES: Examination of the upper and lower extremities : No gross long bone or joint deformity.    NEUROLOGIC: Angel Luis coma score 15  LABORATORY VALUES:   Recent Labs     24  1145  09/04/24  0248   WBC 20.7* 12.5*   RBC 4.64* 4.34*   HEMOGLOBIN 14.9 13.8*   HEMATOCRIT 46.0 42.2   MCV 99.1* 97.2   MCH 32.1 31.8   MCHC 32.4 32.7   RDW 49.3 48.6   PLATELETCT 261 247   MPV 10.0 10.1     Recent Labs     09/03/24  1145 09/04/24  0248   SODIUM 139 141   POTASSIUM 3.5* 4.7   CHLORIDE 102 106   CO2 23 24   GLUCOSE 178* 110*   BUN 12 11   CREATININE 0.59 0.45*   CALCIUM 8.6 9.4     Recent Labs     09/03/24  1145   ASTSGOT 21   ALTSGPT 14   TBILIRUBIN 0.4   ALKPHOSPHAT 178*   GLOBULIN 3.2            IMAGING:   CT-CHEST (THORAX) WITH   Final Result         1. Interval placement of right chest tube with small residual right pneumothorax.      2. Emphysematous changes with large bullae in the right middle lobe.   3. Patchy right basilar atelectasis.         DX-CHEST-PORTABLE (1 VIEW)   Final Result         No appreciable pneumothorax after right chest tube placement.      DX-CHEST-PORTABLE (1 VIEW)   Final Result      Stable large right pneumothorax.      DX-CHEST-PORTABLE (1 VIEW)   Final Result         Large right pneumothorax with collapsing of the right lung.            CRITICAL RESULT READ BACK: Preliminary findings discussed with and critical read back performed by Dr. VALERIA BOLANOS in the Emergency Department via telephone on 9/3/2024 12:08 PM          IMPRESSION AND PLAN:  Trauma  Denies trauma    Primary spontaneous pneumothorax  Large spontaneous pneumothorax   Attempted decompression in ED.  Chest tube placed in ED. (+) air leak noted/no drainage. Placed to suction.  9/4 CXR with no pneumothorax.  CT to suction with no air leak.  Continue chest tube to suction.    Daily chest xray's    Pneumothorax on right  Spontaneous likely due to bleb from emphysema and prior years of smoking.  S/P chest tube.  Monitor pleurovac for air leak  Supplemental oxygen to keep SpO2 >89%  RT per protocol    Primary spontaneous pneumothorax  Spontaneous likely due to bleb from emphysema and prior years of smoking.  S/P  chest tube.  Monitor pleurovac for air leak  Supplemental oxygen to keep SpO2 >89%  RT per protocol   Surgery consulting     Leukocytosis  Likely leukemoid reaction  Monitor am CBC and vitals.  Hold antibiotics for now.    COPD (chronic obstructive pulmonary disease) (HCC)  No exacerbation  RT per protocol  Monitor SpO2 and titrate oxygen to keep >89%  Nebulizer and inhalers    Hypokalemia  9/3 K:3.5  Supplement and monitor  Check Mg    Hyperglycemia  Monitor labs    Elevated alkaline phosphatase level  Alk Phos:178  Needs outpatient work up.  No current abdominal pain.      ____________________________________   Demetrius Mccray MD, FACS      DD: 9/4/2024   DT: 9:00 AM

## 2024-09-04 NOTE — PROGRESS NOTES
Bedside report received.  Assessment complete.  Maltese speaking only.  A&O x 4. Patient calls appropriately.  Patient ambulates with standby assist.    Patient has 3/10 pain. Patient declines intervention at this time.  Denies N&V. Tolerating regular diet.  Surgical R chest tube to -20 suction, no air leak present, chest tube patent, dressing CDI.  + void, + flatus, LBM PTA.  Patient denies SOB.  Family at bedside  Review plan with of care with patient. Call light and personal belongings within reach. Hourly rounding in place. All needs met at this time.

## 2024-09-04 NOTE — CARE PLAN
The patient is Stable - Low risk of patient condition declining or worsening    Shift Goals  Clinical Goals: ct maintenance  Patient Goals: rest    Progress made toward(s) clinical / shift goals:  ct to -20 suction. Patent, no air leak noted. 3L NC. Education provided on poc  Problem: Knowledge Deficit - Standard  Goal: Patient and family/care givers will demonstrate understanding of plan of care, disease process/condition, diagnostic tests and medications  Description: Target End Date:  1-3 days or as soon as patient condition allows    Document in Patient Education    1.  Patient and family/caregiver oriented to unit, equipment, visitation policy and means for communicating concern  2.  Complete/review Learning Assessment  3.  Assess knowledge level of disease process/condition, treatment plan, diagnostic tests and medications  4.  Explain disease process/condition, treatment plan, diagnostic tests and medications  Outcome: Progressing

## 2024-09-04 NOTE — CARE PLAN
Problem: Hyperinflation  Goal: Prevent or improve atelectasis  Description: Target End Date:  3 to 4 days    1. Instruct incentive spirometry usage  2.  Perform hyperinflation therapy as indicated  Flowsheets (Taken 9/4/2024 1511)  Hyperinflation Protocol Goals/Outcome: Increase and/or stable inspiratory capacity for 24 hours  Hyperinflation Protocol Indications: Chest Trauma (Blunt, Penetrative, Crushing, or Surgical)  Note: PEP QID

## 2024-09-04 NOTE — PROGRESS NOTES
Pt arrived to unit from ed  AAOx4, Macedonian speaking. Family at bedside  4L Ox[place]  CT to right to -20 suction, patent. No air leak noted  +void   Ambulating to br steady gait    Education provided on poc  Safety maintained, call light within reach

## 2024-09-04 NOTE — ASSESSMENT & PLAN NOTE
Spontaneous likely due to bleb from emphysema and prior years of smoking.  S/P chest tube after initial catheter at OSH failed.  Monitor pleurovac + air leak, chest tube placed to  waterseal  Supplemental oxygen to keep SpO2 >89%   -- today his O2 requirement has been getting better, currently on 1 L of oxygen,  chest tube removed    Home O2 eval

## 2024-09-04 NOTE — CARE PLAN
The patient is Stable - Low risk of patient condition declining or worsening    Shift Goals  Clinical Goals: Pulmonary hygiene, pain management, ambulation  Patient Goals: Rest  Family Goals: Updates    Progress made toward(s) clinical / shift goals:  Patient demonstrates effective deep breathing and use of incentive spirometer. Patient reports some pain with coughing and was instructed to splint using a pillow. Patient reports low levels of pain and declines intervention at this time, stating his pain is tolerable and only there when moving or coughing. Patient is ambulating up to bathroom. Patient is resting at this time. Family at bedside.    Patient is not progressing towards the following goals:

## 2024-09-04 NOTE — OP REPORT
Preoperative Diagnosis:  right pneumothorax    Postoperative Diagnosis :  Same      PROCEDURES PERFORMED:   1. Right tube thoracostomy.   2. Intercostal nerve blocks, multiple.     SURGEON: Demetrius Mccray MD, FACS     INDICATIONS: The patient is a 84 y.o. male with a pneumothorax      FINDINGS:  continuous air leak     PROCEDURE: Following implied emergent consent, the patient was properly identified and optimally positioned. A moderate sedation consisting of intravenous fentanyl and midazolam was administered. The  chest was widely prepped and draped into a sterile field.     Local anesthetic was used to infiltrate the chest tube site. Multiple intercostal nerve blocks were placed above and below the chest tube site dermatome. A 1.5-cm transverse incision was made and the subcutaneous tissue spread bluntly. The thoracic cavity was accessed bluntly over the rib and a 32-Lao chest tube placed in a anterior apical orientation and secured to the skin with a 0-Ethibond  suture.     The chest tube was connected to close drainage suction and sterile dressing   was applied. The patient tolerated the procedure well. There were no apparent   complications.   ____________________________________   Demetrius Mccray MD, FACS     MJG/ YASMANY   DD: 9/4/2024  9:07 AM

## 2024-09-04 NOTE — ED NOTES
Report called to floor RN. Patient and all belongings transferred to -5 by transport staff with patient on 4L/NC and resting comfortably, resp even and unlabored, NAD. Family accompanied patient.

## 2024-09-05 ENCOUNTER — APPOINTMENT (OUTPATIENT)
Dept: RADIOLOGY | Facility: MEDICAL CENTER | Age: 84
DRG: 166 | End: 2024-09-05
Attending: HOSPITALIST
Payer: MEDICARE

## 2024-09-05 ENCOUNTER — ANESTHESIA (OUTPATIENT)
Dept: SURGERY | Facility: MEDICAL CENTER | Age: 84
DRG: 166 | End: 2024-09-05
Payer: MEDICARE

## 2024-09-05 LAB
ANION GAP SERPL CALC-SCNC: 10 MMOL/L (ref 7–16)
APTT PPP: 33.9 SEC (ref 24.7–36)
BASOPHILS # BLD AUTO: 0.3 % (ref 0–1.8)
BASOPHILS # BLD: 0.02 K/UL (ref 0–0.12)
BUN SERPL-MCNC: 15 MG/DL (ref 8–22)
CALCIUM SERPL-MCNC: 9 MG/DL (ref 8.5–10.5)
CHLORIDE SERPL-SCNC: 101 MMOL/L (ref 96–112)
CO2 SERPL-SCNC: 27 MMOL/L (ref 20–33)
CREAT SERPL-MCNC: 0.45 MG/DL (ref 0.5–1.4)
EOSINOPHIL # BLD AUTO: 0.04 K/UL (ref 0–0.51)
EOSINOPHIL NFR BLD: 0.5 % (ref 0–6.9)
ERYTHROCYTE [DISTWIDTH] IN BLOOD BY AUTOMATED COUNT: 50.1 FL (ref 35.9–50)
GFR SERPLBLD CREATININE-BSD FMLA CKD-EPI: 104 ML/MIN/1.73 M 2
GLUCOSE SERPL-MCNC: 97 MG/DL (ref 65–99)
HCT VFR BLD AUTO: 40.9 % (ref 42–52)
HGB BLD-MCNC: 13.2 G/DL (ref 14–18)
IMM GRANULOCYTES # BLD AUTO: 0.02 K/UL (ref 0–0.11)
IMM GRANULOCYTES NFR BLD AUTO: 0.3 % (ref 0–0.9)
INR PPP: 1.38 (ref 0.87–1.13)
LYMPHOCYTES # BLD AUTO: 1.14 K/UL (ref 1–4.8)
LYMPHOCYTES NFR BLD: 15.3 % (ref 22–41)
MCH RBC QN AUTO: 32 PG (ref 27–33)
MCHC RBC AUTO-ENTMCNC: 32.3 G/DL (ref 32.3–36.5)
MCV RBC AUTO: 99 FL (ref 81.4–97.8)
MONOCYTES # BLD AUTO: 0.6 K/UL (ref 0–0.85)
MONOCYTES NFR BLD AUTO: 8.1 % (ref 0–13.4)
NEUTROPHILS # BLD AUTO: 5.61 K/UL (ref 1.82–7.42)
NEUTROPHILS NFR BLD: 75.5 % (ref 44–72)
NRBC # BLD AUTO: 0 K/UL
NRBC BLD-RTO: 0 /100 WBC (ref 0–0.2)
PLATELET # BLD AUTO: 226 K/UL (ref 164–446)
PMV BLD AUTO: 10.4 FL (ref 9–12.9)
POTASSIUM SERPL-SCNC: 4.3 MMOL/L (ref 3.6–5.5)
PROTHROMBIN TIME: 17.2 SEC (ref 12–14.6)
RBC # BLD AUTO: 4.13 M/UL (ref 4.7–6.1)
SODIUM SERPL-SCNC: 138 MMOL/L (ref 135–145)
WBC # BLD AUTO: 7.4 K/UL (ref 4.8–10.8)

## 2024-09-05 PROCEDURE — A9270 NON-COVERED ITEM OR SERVICE: HCPCS | Performed by: HOSPITALIST

## 2024-09-05 PROCEDURE — 700101 HCHG RX REV CODE 250: Performed by: SURGERY

## 2024-09-05 PROCEDURE — 32651 THORACOSCOPY REMOVE CORTEX: CPT | Performed by: SURGERY

## 2024-09-05 PROCEDURE — 80048 BASIC METABOLIC PNL TOTAL CA: CPT

## 2024-09-05 PROCEDURE — 700101 HCHG RX REV CODE 250: Performed by: ANESTHESIOLOGY

## 2024-09-05 PROCEDURE — 99232 SBSQ HOSP IP/OBS MODERATE 35: CPT | Performed by: HOSPITALIST

## 2024-09-05 PROCEDURE — 85730 THROMBOPLASTIN TIME PARTIAL: CPT

## 2024-09-05 PROCEDURE — 700102 HCHG RX REV CODE 250 W/ 637 OVERRIDE(OP): Performed by: HOSPITALIST

## 2024-09-05 PROCEDURE — 160028 HCHG SURGERY MINUTES - 1ST 30 MINS LEVEL 3: Performed by: SURGERY

## 2024-09-05 PROCEDURE — 700111 HCHG RX REV CODE 636 W/ 250 OVERRIDE (IP): Performed by: ANESTHESIOLOGY

## 2024-09-05 PROCEDURE — 700111 HCHG RX REV CODE 636 W/ 250 OVERRIDE (IP): Mod: JZ | Performed by: ANESTHESIOLOGY

## 2024-09-05 PROCEDURE — 3E0L4GC INTRODUCTION OF OTHER THERAPEUTIC SUBSTANCE INTO PLEURAL CAVITY, PERCUTANEOUS ENDOSCOPIC APPROACH: ICD-10-PCS | Performed by: SURGERY

## 2024-09-05 PROCEDURE — 160009 HCHG ANES TIME/MIN: Performed by: SURGERY

## 2024-09-05 PROCEDURE — 770001 HCHG ROOM/CARE - MED/SURG/GYN PRIV*

## 2024-09-05 PROCEDURE — 160048 HCHG OR STATISTICAL LEVEL 1-5: Performed by: SURGERY

## 2024-09-05 PROCEDURE — 700102 HCHG RX REV CODE 250 W/ 637 OVERRIDE(OP): Performed by: ANESTHESIOLOGY

## 2024-09-05 PROCEDURE — 94669 MECHANICAL CHEST WALL OSCILL: CPT

## 2024-09-05 PROCEDURE — 71045 X-RAY EXAM CHEST 1 VIEW: CPT

## 2024-09-05 PROCEDURE — C1729 CATH, DRAINAGE: HCPCS | Performed by: SURGERY

## 2024-09-05 PROCEDURE — A9270 NON-COVERED ITEM OR SERVICE: HCPCS | Performed by: ANESTHESIOLOGY

## 2024-09-05 PROCEDURE — 85610 PROTHROMBIN TIME: CPT

## 2024-09-05 PROCEDURE — 36415 COLL VENOUS BLD VENIPUNCTURE: CPT

## 2024-09-05 PROCEDURE — 160039 HCHG SURGERY MINUTES - EA ADDL 1 MIN LEVEL 3: Performed by: SURGERY

## 2024-09-05 PROCEDURE — 85025 COMPLETE CBC W/AUTO DIFF WBC: CPT

## 2024-09-05 PROCEDURE — 160035 HCHG PACU - 1ST 60 MINS PHASE I: Performed by: SURGERY

## 2024-09-05 PROCEDURE — 160002 HCHG RECOVERY MINUTES (STAT): Performed by: SURGERY

## 2024-09-05 RX ORDER — ONDANSETRON 2 MG/ML
INJECTION INTRAMUSCULAR; INTRAVENOUS PRN
Status: DISCONTINUED | OUTPATIENT
Start: 2024-09-05 | End: 2024-09-05 | Stop reason: SURG

## 2024-09-05 RX ORDER — EPHEDRINE SULFATE 50 MG/ML
INJECTION, SOLUTION INTRAVENOUS PRN
Status: DISCONTINUED | OUTPATIENT
Start: 2024-09-05 | End: 2024-09-05 | Stop reason: SURG

## 2024-09-05 RX ORDER — PHENYLEPHRINE HCL IN 0.9% NACL 1 MG/10 ML
SYRINGE (ML) INTRAVENOUS PRN
Status: DISCONTINUED | OUTPATIENT
Start: 2024-09-05 | End: 2024-09-05 | Stop reason: SURG

## 2024-09-05 RX ORDER — OXYCODONE HCL 5 MG/5 ML
10 SOLUTION, ORAL ORAL
Status: COMPLETED | OUTPATIENT
Start: 2024-09-05 | End: 2024-09-05

## 2024-09-05 RX ORDER — CEFAZOLIN SODIUM 1 G/3ML
INJECTION, POWDER, FOR SOLUTION INTRAMUSCULAR; INTRAVENOUS PRN
Status: DISCONTINUED | OUTPATIENT
Start: 2024-09-05 | End: 2024-09-05 | Stop reason: SURG

## 2024-09-05 RX ORDER — HYDROMORPHONE HYDROCHLORIDE 1 MG/ML
0.1 INJECTION, SOLUTION INTRAMUSCULAR; INTRAVENOUS; SUBCUTANEOUS
Status: DISCONTINUED | OUTPATIENT
Start: 2024-09-05 | End: 2024-09-05 | Stop reason: HOSPADM

## 2024-09-05 RX ORDER — ROCURONIUM BROMIDE 10 MG/ML
INJECTION, SOLUTION INTRAVENOUS PRN
Status: DISCONTINUED | OUTPATIENT
Start: 2024-09-05 | End: 2024-09-05 | Stop reason: SURG

## 2024-09-05 RX ORDER — SODIUM CHLORIDE, SODIUM LACTATE, POTASSIUM CHLORIDE, CALCIUM CHLORIDE 600; 310; 30; 20 MG/100ML; MG/100ML; MG/100ML; MG/100ML
INJECTION, SOLUTION INTRAVENOUS CONTINUOUS
Status: DISCONTINUED | OUTPATIENT
Start: 2024-09-05 | End: 2024-09-05 | Stop reason: HOSPADM

## 2024-09-05 RX ORDER — HYDROMORPHONE HYDROCHLORIDE 1 MG/ML
0.4 INJECTION, SOLUTION INTRAMUSCULAR; INTRAVENOUS; SUBCUTANEOUS
Status: DISCONTINUED | OUTPATIENT
Start: 2024-09-05 | End: 2024-09-05 | Stop reason: HOSPADM

## 2024-09-05 RX ORDER — OXYCODONE HCL 5 MG/5 ML
5 SOLUTION, ORAL ORAL
Status: COMPLETED | OUTPATIENT
Start: 2024-09-05 | End: 2024-09-05

## 2024-09-05 RX ORDER — DIPHENHYDRAMINE HYDROCHLORIDE 50 MG/ML
12.5 INJECTION INTRAMUSCULAR; INTRAVENOUS
Status: DISCONTINUED | OUTPATIENT
Start: 2024-09-05 | End: 2024-09-05 | Stop reason: HOSPADM

## 2024-09-05 RX ORDER — ONDANSETRON 2 MG/ML
4 INJECTION INTRAMUSCULAR; INTRAVENOUS
Status: DISCONTINUED | OUTPATIENT
Start: 2024-09-05 | End: 2024-09-05 | Stop reason: HOSPADM

## 2024-09-05 RX ORDER — HYDROMORPHONE HYDROCHLORIDE 1 MG/ML
0.2 INJECTION, SOLUTION INTRAMUSCULAR; INTRAVENOUS; SUBCUTANEOUS
Status: DISCONTINUED | OUTPATIENT
Start: 2024-09-05 | End: 2024-09-05 | Stop reason: HOSPADM

## 2024-09-05 RX ORDER — DEXAMETHASONE SODIUM PHOSPHATE 4 MG/ML
INJECTION, SOLUTION INTRA-ARTICULAR; INTRALESIONAL; INTRAMUSCULAR; INTRAVENOUS; SOFT TISSUE PRN
Status: DISCONTINUED | OUTPATIENT
Start: 2024-09-05 | End: 2024-09-05 | Stop reason: SURG

## 2024-09-05 RX ORDER — BUPIVACAINE HYDROCHLORIDE AND EPINEPHRINE 2.5; 5 MG/ML; UG/ML
INJECTION, SOLUTION EPIDURAL; INFILTRATION; INTRACAUDAL; PERINEURAL
Status: DISCONTINUED | OUTPATIENT
Start: 2024-09-05 | End: 2024-09-05 | Stop reason: HOSPADM

## 2024-09-05 RX ADMIN — OXYCODONE HYDROCHLORIDE 10 MG: 10 TABLET ORAL at 07:32

## 2024-09-05 RX ADMIN — PREDNISOLONE ACETATE 1 DROP: 10 SUSPENSION/ DROPS OPHTHALMIC at 05:05

## 2024-09-05 RX ADMIN — OXYCODONE HYDROCHLORIDE 10 MG: 5 SOLUTION ORAL at 15:35

## 2024-09-05 RX ADMIN — DEXAMETHASONE SODIUM PHOSPHATE 4 MG: 4 INJECTION INTRA-ARTICULAR; INTRALESIONAL; INTRAMUSCULAR; INTRAVENOUS; SOFT TISSUE at 14:05

## 2024-09-05 RX ADMIN — PROPOFOL 100 MG: 10 INJECTION, EMULSION INTRAVENOUS at 14:01

## 2024-09-05 RX ADMIN — FENTANYL CITRATE 100 MCG: 50 INJECTION, SOLUTION INTRAMUSCULAR; INTRAVENOUS at 13:56

## 2024-09-05 RX ADMIN — KETOROLAC TROMETHAMINE 1 DROP: 0.5 SOLUTION OPHTHALMIC at 05:05

## 2024-09-05 RX ADMIN — CEFAZOLIN 2 G: 1 INJECTION, POWDER, FOR SOLUTION INTRAMUSCULAR; INTRAVENOUS at 14:06

## 2024-09-05 RX ADMIN — FENTANYL CITRATE 50 MCG: 50 INJECTION, SOLUTION INTRAMUSCULAR; INTRAVENOUS at 15:35

## 2024-09-05 RX ADMIN — SUGAMMADEX 200 MG: 100 INJECTION, SOLUTION INTRAVENOUS at 15:02

## 2024-09-05 RX ADMIN — EPHEDRINE SULFATE 10 MG: 50 INJECTION, SOLUTION INTRAVENOUS at 14:01

## 2024-09-05 RX ADMIN — Medication 200 MCG: at 14:12

## 2024-09-05 RX ADMIN — ROCURONIUM BROMIDE 40 MG: 50 INJECTION, SOLUTION INTRAVENOUS at 14:02

## 2024-09-05 RX ADMIN — ONDANSETRON 4 MG: 2 INJECTION INTRAMUSCULAR; INTRAVENOUS at 15:02

## 2024-09-05 ASSESSMENT — ENCOUNTER SYMPTOMS
SPEECH CHANGE: 0
GASTROINTESTINAL NEGATIVE: 1
FOCAL WEAKNESS: 0
BACK PAIN: 1
EYE DISCHARGE: 0
BRUISES/BLEEDS EASILY: 0
CLAUDICATION: 0
SHORTNESS OF BREATH: 0
WHEEZING: 0
EYE PAIN: 0
VOMITING: 0
NECK PAIN: 0
EYES NEGATIVE: 1
BACK PAIN: 0
MYALGIAS: 0
WEAKNESS: 0
DIAPHORESIS: 0
ABDOMINAL PAIN: 0
SORE THROAT: 0
SPUTUM PRODUCTION: 0
CONSTIPATION: 0
SENSORY CHANGE: 0
HEADACHES: 0
CHILLS: 0
LOSS OF CONSCIOUSNESS: 0
DIARRHEA: 0
CARDIOVASCULAR NEGATIVE: 1
MYALGIAS: 1
HEMOPTYSIS: 0
DEPRESSION: 0
DIZZINESS: 0
NAUSEA: 0
PALPITATIONS: 0
NEUROLOGICAL NEGATIVE: 1
COUGH: 0
FEVER: 0

## 2024-09-05 ASSESSMENT — PAIN DESCRIPTION - PAIN TYPE
TYPE: SURGICAL PAIN
TYPE: ACUTE PAIN
TYPE: SURGICAL PAIN
TYPE: ACUTE PAIN
TYPE: ACUTE PAIN
TYPE: ACUTE PAIN;SURGICAL PAIN
TYPE: SURGICAL PAIN
TYPE: ACUTE PAIN
TYPE: SURGICAL PAIN

## 2024-09-05 ASSESSMENT — LIFESTYLE VARIABLES: SUBSTANCE_ABUSE: 0

## 2024-09-05 NOTE — DIETARY
"Brief Nutrition Note:    Requested admit weight and height from RN. Pt is missing admit height and admit weight was \"estimated.\" Appreciate nursing assistance with this. Attempted visit to pt though he was being transported to surgery. RD will follow up as indicated/as able.   "

## 2024-09-05 NOTE — ANESTHESIA PROCEDURE NOTES
Airway    Date/Time: 9/5/2024 2:04 PM    Performed by: Yamil Wolf M.D.  Authorized by: Yamil Wolf M.D.    Location:  OR  Urgency:  Elective  Indications for Airway Management:  Anesthesia      Spontaneous Ventilation: absent    Sedation Level:  Deep  Preoxygenated: Yes    Patient Position:  Sniffing  Mask Difficulty Assessment:  1 - vent by mask  Final Airway Type:  Endotracheal airway  Final Endotracheal Airway:  ETT - double lumen left with ONE LUNG VENTILATION  Cuffed: Yes    Technique Used for Successful ETT Placement:  Video laryngoscopy    Insertion Site:  Oral  Blade Type:  Glide  Laryngoscope Blade/Videolaryngoscope Blade Size:  3  ETT Double Lumen (fr):  37  Measured from:  Teeth  ETT to Teeth (cm):  29  Placement Verified by: auscultation and capnometry    Cormack-Lehane Classification:  Grade I - full view of glottis  Number of Attempts at Approach:  1

## 2024-09-05 NOTE — ANESTHESIA PREPROCEDURE EVALUATION
Case: [id number] Date/Time: 09/05/24 1215    Procedure: VATS, WITH PLEURODESIS (Right)    Location: [facility] OR 04 / SURGERY [facility]    Surgeons: Demetrius Mccray M.D.            Relevant Problems   PULMONARY   (positive) COPD (chronic obstructive pulmonary disease) (HCC)       Physical Exam    Airway   Mallampati: II  TM distance: >3 FB  Neck ROM: full       Cardiovascular - normal exam  Rhythm: regular  Rate: normal  (-) murmur     Dental - normal exam      Very poor dentition     Pulmonary - normal exam  Breath sounds clear to auscultation     Abdominal    Neurological - normal exam                   Anesthesia Plan    ASA 2       Plan - general       Airway plan will be ETT    (Interview completed through official )      Induction: intravenous    Postoperative Plan: Postoperative administration of opioids is intended.    Pertinent diagnostic labs and testing reviewed    Informed Consent:    Anesthetic plan and risks discussed with patient.    Use of blood products discussed with: patient whom consented to blood products.

## 2024-09-05 NOTE — PROGRESS NOTES
Hospital Medicine Daily Progress Note    Date of Service  9/5/2024    Chief Complaint  Joaquín Hartman is a 84 y.o. male admitted 9/3/2024 with spontaneous right pneumothorax.    Hospital Course  Joaquín Hartman is a Sinhala speaking 84 y.o. male w/ a hx of COPD and prior smoker who presented 9/3/2024 with acute shortness of breath to a medical facility in [facility].  He was found to have a right sided large pneumothorax and medically air flown to [facility] after chest intracatheter placement.   Here he received an emergent chest tube with return of right sided lung appearance on follow up xray.  He denies recent trauma.   Likely spontaneous pneumothorax is from a pulmonary emphysematous bleb that ruptured.    Interval Problem Update  9/4: Patient states he is having pain.  I did use a .  He seems clinically dehydrated.  Therefore increased IV fluids from 75 an hour to 125 an hour.  He is currently not eating due to pain.  Increased his pain medications.  N.p.o. at midnight for thoracoscopy and pleurodesis in AM.  9/5:  successful thoracoscopy and pleurodesis performed.  In PACU doing well post op.    I have discussed this patient's plan of care and discharge plan at IDT rounds today with Case Management, Nursing, Nursing leadership, and other members of the IDT team.    Consultants/Specialty  general surgery    Code Status  Full Code    Disposition  The patient is not medically cleared for discharge to home or a post-acute facility.  Anticipate discharge to: home with organized home healthcare and close outpatient follow-up    I have placed the appropriate orders for post-discharge needs.    Review of Systems  Review of Systems   Constitutional:  Negative for chills, diaphoresis, fever and malaise/fatigue.   HENT:  Negative for congestion and sore throat.    Eyes:  Negative for pain and discharge.   Respiratory:  Negative for cough, hemoptysis, sputum production, shortness of breath and wheezing.     Cardiovascular:  Positive for chest pain. Negative for palpitations, claudication and leg swelling.   Gastrointestinal:  Negative for abdominal pain, constipation, diarrhea, melena, nausea and vomiting.   Genitourinary:  Negative for dysuria, frequency and urgency.   Musculoskeletal:  Negative for back pain, joint pain, myalgias and neck pain.   Skin:  Negative for itching and rash.   Neurological:  Negative for dizziness, sensory change, speech change, focal weakness, loss of consciousness, weakness and headaches.   Endo/Heme/Allergies:  Does not bruise/bleed easily.   Psychiatric/Behavioral:  Negative for depression, substance abuse and suicidal ideas.         Physical Exam  Temp:  [35.6 °C (96 °F)-37 °C (98.6 °F)] 35.9 °C (96.7 °F)  Pulse:  [62-85] 73  Resp:  [14-20] 16  BP: (108-146)/(59-70) 137/61  SpO2:  [91 %-100 %] 98 %    Physical Exam  Constitutional:       General: He is not in acute distress.     Appearance: He is not diaphoretic.      Comments: Frail, rib protuberance.   HENT:      Head: Normocephalic and atraumatic.      Mouth/Throat:      Mouth: Mucous membranes are dry.      Pharynx: No oropharyngeal exudate.   Eyes:      General: No scleral icterus.        Right eye: No discharge.         Left eye: No discharge.      Conjunctiva/sclera: Conjunctivae normal.      Pupils: Pupils are equal, round, and reactive to light.   Neck:      Thyroid: No thyromegaly.      Vascular: No JVD.      Trachea: No tracheal deviation.   Cardiovascular:      Rate and Rhythm: Normal rate and regular rhythm.      Heart sounds: Normal heart sounds. No murmur heard.     No friction rub. No gallop.   Pulmonary:      Effort: Pulmonary effort is normal. No respiratory distress.      Breath sounds: Normal breath sounds. No wheezing or rales.      Comments: Right chest tube to suction.  Chest:      Chest wall: No tenderness.   Abdominal:      General: Bowel sounds are normal. There is no distension.      Palpations: Abdomen is  soft. There is no mass.      Tenderness: There is no abdominal tenderness. There is no guarding or rebound.   Musculoskeletal:         General: No tenderness. Normal range of motion.      Cervical back: Normal range of motion and neck supple.   Lymphadenopathy:      Cervical: No cervical adenopathy.   Skin:     General: Skin is warm and dry.      Capillary Refill: Capillary refill takes less than 2 seconds.      Findings: No erythema or rash.   Neurological:      General: No focal deficit present.      Mental Status: He is alert and oriented to person, place, and time.      Cranial Nerves: No cranial nerve deficit.      Motor: No abnormal muscle tone.   Psychiatric:         Mood and Affect: Mood normal.         Behavior: Behavior normal.         Thought Content: Thought content normal.         Judgment: Judgment normal.         Fluids    Intake/Output Summary (Last 24 hours) at 9/5/2024 1611  Last data filed at 9/5/2024 1518  Gross per 24 hour   Intake 3663.06 ml   Output 395 ml   Net 3268.06 ml        Laboratory  Recent Labs     09/03/24  1145 09/04/24  0248 09/05/24  0301   WBC 20.7* 12.5* 7.4   RBC 4.64* 4.34* 4.13*   HEMOGLOBIN 14.9 13.8* 13.2*   HEMATOCRIT 46.0 42.2 40.9*   MCV 99.1* 97.2 99.0*   MCH 32.1 31.8 32.0   MCHC 32.4 32.7 32.3   RDW 49.3 48.6 50.1*   PLATELETCT 261 247 226   MPV 10.0 10.1 10.4     Recent Labs     09/03/24  1145 09/04/24  0248 09/05/24  0301   SODIUM 139 141 138   POTASSIUM 3.5* 4.7 4.3   CHLORIDE 102 106 101   CO2 23 24 27   GLUCOSE 178* 110* 97   BUN 12 11 15   CREATININE 0.59 0.45* 0.45*   CALCIUM 8.6 9.4 9.0     Recent Labs     09/05/24  0301   APTT 33.9   INR 1.38*               Imaging  DX-CHEST-PORTABLE (1 VIEW)   Final Result         1.  Interstitial pulmonary parenchymal prominence suggest chronic underlying lung disease, component of interstitial edema and/or infiltrates not excluded.      DX-CHEST-PORTABLE (1 VIEW)   Final Result      1.  Presents right chest tube      2.   Tiny right pneumothorax      CT-CHEST (THORAX) WITH   Final Result         1. Interval placement of right chest tube with small residual right pneumothorax.      2. Emphysematous changes with large bullae in the right middle lobe.   3. Patchy right basilar atelectasis.         DX-CHEST-PORTABLE (1 VIEW)   Final Result         No appreciable pneumothorax after right chest tube placement.      DX-CHEST-PORTABLE (1 VIEW)   Final Result      Stable large right pneumothorax.      DX-CHEST-PORTABLE (1 VIEW)   Final Result         Large right pneumothorax with collapsing of the right lung.            CRITICAL RESULT READ BACK: Preliminary findings discussed with and critical read back performed by Dr. VALERIA BOLANOS in the Emergency Department via telephone on 9/3/2024 12:08 PM           Assessment/Plan  * Primary spontaneous pneumothorax- (present on admission)  Assessment & Plan  Spontaneous likely due to bleb from emphysema and prior years of smoking.  S/P chest tube after initial catheter at OSH failed.  Monitor pleurovac + air leak, chest tube placed to suction.  Supplemental oxygen to keep SpO2 >89%  RT per protocol   Surgery consulting   Plan for thoracoscopy and pleurodesis 9/5. NPO at [facility].  Increased pain control    Dehydration- (present on admission)  Assessment & Plan  Clinically dry mucus membranes, poor skin turgor.  Increased NS 75 to 125/hr.   Not currently eating, despite patient stating he ate lunch.  Nothing was eaten off his lunch tray.    Hyperglycemia  Assessment & Plan  Monitor labs    Elevated alkaline phosphatase level  Assessment & Plan  Alk Phos:178  Needs outpatient work up.  No current abdominal pain.    Hypokalemia  Assessment & Plan  9/3 K:3.5  Supplement and monitor  Check Mg    COPD (chronic obstructive pulmonary disease) (HCC)- (present on admission)  Assessment & Plan  No exacerbation  RT per protocol  Monitor SpO2 and titrate oxygen to keep >89%  Nebulizer and  inhalers    Leukocytosis  Assessment & Plan  Likely leukemoid reaction  Monitor am CBC and vitals.  Hold antibiotics for now.         VTE prophylaxis:   SCDs/TEDs      I have performed a physical exam and reviewed and updated ROS and Plan today (9/5/2024). In review of yesterday's note (9/4/2024), there are no changes except as documented above.

## 2024-09-05 NOTE — PROGRESS NOTES
Hospital Medicine Daily Progress Note    Date of Service  9/4/2024    Chief Complaint  Joaquín Hartman is a 84 y.o. male admitted 9/3/2024 with spontaneous right pneumothorax.    Hospital Course  Joaquín Hartman is a Sinhala speaking 84 y.o. male w/ a hx of COPD and prior smoker who presented 9/3/2024 with acute shortness of breath to a medical facility in [facility].  He was found to have a right sided large pneumothorax and medically air flown to [facility] after chest intracatheter placement.   Here he received an emergent chest tube with return of right sided lung appearance on follow up xray.  He denies recent trauma.   Likely spontaneous pneumothorax is from a pulmonary emphysematous bleb that ruptured.    Interval Problem Update  9/4: Patient states he is having pain.  I did use a .  He seems clinically dehydrated.  Therefore increased IV fluids from 75 an hour to 125 an hour.  He is currently not eating due to pain.  Increased his pain medications.  N.p.o. at midnight for thoracoscopy and pleurodesis in AM.    I have discussed this patient's plan of care and discharge plan at IDT rounds today with Case Management, Nursing, Nursing leadership, and other members of the IDT team.    Consultants/Specialty  general surgery    Code Status  Full Code    Disposition  The patient is not medically cleared for discharge to home or a post-acute facility.  Anticipate discharge to: home with close outpatient follow-up    I have placed the appropriate orders for post-discharge needs.    Review of Systems  Review of Systems   Constitutional:  Negative for chills, diaphoresis, fever and malaise/fatigue.   HENT:  Negative for congestion and sore throat.    Eyes:  Negative for pain and discharge.   Respiratory:  Negative for cough, hemoptysis, sputum production, shortness of breath and wheezing.    Cardiovascular:  Positive for chest pain. Negative for palpitations, claudication and leg swelling.   Gastrointestinal:   Negative for abdominal pain, constipation, diarrhea, melena, nausea and vomiting.   Genitourinary:  Negative for dysuria, frequency and urgency.   Musculoskeletal:  Negative for back pain, joint pain, myalgias and neck pain.   Skin:  Negative for itching and rash.   Neurological:  Negative for dizziness, sensory change, speech change, focal weakness, loss of consciousness, weakness and headaches.   Endo/Heme/Allergies:  Does not bruise/bleed easily.   Psychiatric/Behavioral:  Negative for depression, substance abuse and suicidal ideas.         Physical Exam  Temp:  [36.2 °C (97.2 °F)-36.8 °C (98.2 °F)] 36.8 °C (98.2 °F)  Pulse:  [65-75] 67  Resp:  [16-20] 16  BP: (104-152)/(59-70) 128/68  SpO2:  [91 %-97 %] 94 %    Physical Exam  Constitutional:       General: He is not in acute distress.     Appearance: He is not diaphoretic.      Comments: Frail, rib protuberance.   HENT:      Head: Normocephalic and atraumatic.      Mouth/Throat:      Mouth: Mucous membranes are dry.      Pharynx: No oropharyngeal exudate.   Eyes:      General: No scleral icterus.        Right eye: No discharge.         Left eye: No discharge.      Conjunctiva/sclera: Conjunctivae normal.      Pupils: Pupils are equal, round, and reactive to light.   Neck:      Thyroid: No thyromegaly.      Vascular: No JVD.      Trachea: No tracheal deviation.   Cardiovascular:      Rate and Rhythm: Normal rate and regular rhythm.      Heart sounds: Normal heart sounds. No murmur heard.     No friction rub. No gallop.   Pulmonary:      Effort: Pulmonary effort is normal. No respiratory distress.      Breath sounds: Normal breath sounds. No wheezing or rales.      Comments: Right chest tube to suction.  Chest:      Chest wall: No tenderness.   Abdominal:      General: Bowel sounds are normal. There is no distension.      Palpations: Abdomen is soft. There is no mass.      Tenderness: There is no abdominal tenderness. There is no guarding or rebound.    Musculoskeletal:         General: No tenderness. Normal range of motion.      Cervical back: Normal range of motion and neck supple.   Lymphadenopathy:      Cervical: No cervical adenopathy.   Skin:     General: Skin is warm and dry.      Capillary Refill: Capillary refill takes less than 2 seconds.      Findings: No erythema or rash.   Neurological:      General: No focal deficit present.      Mental Status: He is alert and oriented to person, place, and time.      Cranial Nerves: No cranial nerve deficit.      Motor: No abnormal muscle tone.   Psychiatric:         Mood and Affect: Mood normal.         Behavior: Behavior normal.         Thought Content: Thought content normal.         Judgment: Judgment normal.         Fluids    Intake/Output Summary (Last 24 hours) at 9/4/2024 1805  Last data filed at 9/4/2024 1702  Gross per 24 hour   Intake 480 ml   Output 277 ml   Net 203 ml        Laboratory  Recent Labs     09/03/24  1145 09/04/24  0248   WBC 20.7* 12.5*   RBC 4.64* 4.34*   HEMOGLOBIN 14.9 13.8*   HEMATOCRIT 46.0 42.2   MCV 99.1* 97.2   MCH 32.1 31.8   MCHC 32.4 32.7   RDW 49.3 48.6   PLATELETCT 261 247   MPV 10.0 10.1     Recent Labs     09/03/24  1145 09/04/24  0248   SODIUM 139 141   POTASSIUM 3.5* 4.7   CHLORIDE 102 106   CO2 23 24   GLUCOSE 178* 110*   BUN 12 11   CREATININE 0.59 0.45*   CALCIUM 8.6 9.4                   Imaging  DX-CHEST-PORTABLE (1 VIEW)   Final Result      1.  Presents right chest tube      2.  Tiny right pneumothorax      CT-CHEST (THORAX) WITH   Final Result         1. Interval placement of right chest tube with small residual right pneumothorax.      2. Emphysematous changes with large bullae in the right middle lobe.   3. Patchy right basilar atelectasis.         DX-CHEST-PORTABLE (1 VIEW)   Final Result         No appreciable pneumothorax after right chest tube placement.      DX-CHEST-PORTABLE (1 VIEW)   Final Result      Stable large right pneumothorax.      DX-CHEST-PORTABLE  (1 VIEW)   Final Result         Large right pneumothorax with collapsing of the right lung.            CRITICAL RESULT READ BACK: Preliminary findings discussed with and critical read back performed by Dr. VALERIA BOLANOS in the Emergency Department via telephone on 9/3/2024 12:08 PM           Assessment/Plan  * Primary spontaneous pneumothorax- (present on admission)  Assessment & Plan  Spontaneous likely due to bleb from emphysema and prior years of smoking.  S/P chest tube after initial catheter at OSH failed.  Monitor pleurovac + air leak, chest tube placed to suction.  Supplemental oxygen to keep SpO2 >89%  RT per protocol   Surgery consulting   Plan for thoracoscopy and pleurodesis 9/5. NPO at [facility].  Increased pain control    Dehydration- (present on admission)  Assessment & Plan  Clinically dry mucus membranes, poor skin turgor.  Increased NS 75 to 125/hr.   Not currently eating, despite patient stating he ate lunch.  Nothing was eaten off his lunch tray.    Hyperglycemia  Assessment & Plan  Monitor labs    Elevated alkaline phosphatase level  Assessment & Plan  Alk Phos:178  Needs outpatient work up.  No current abdominal pain.    Hypokalemia  Assessment & Plan  9/3 K:3.5  Supplement and monitor  Check Mg    COPD (chronic obstructive pulmonary disease) (HCC)- (present on admission)  Assessment & Plan  No exacerbation  RT per protocol  Monitor SpO2 and titrate oxygen to keep >89%  Nebulizer and inhalers    Leukocytosis  Assessment & Plan  Likely leukemoid reaction  Monitor am CBC and vitals.  Hold antibiotics for now.         VTE prophylaxis:   SCDs/TEDs      I have performed a physical exam and reviewed and updated ROS and Plan today (9/4/2024). In review of yesterday's note (9/3/2024), there are no changes except as documented above.

## 2024-09-05 NOTE — CARE PLAN
The patient is Watcher - Medium risk of patient condition declining or worsening    Shift Goals  Clinical Goals: Pain management, pulmonary hygiene  Patient Goals: Rest  Family Goals: Updates    Progress made toward(s) clinical / shift goals:  Patient is resting at this time. Family at bedside. Plan for today discussed with patient, patient and family verbalizes understanding.    Patient is not progressing towards the following goals: Patient reports pain being worse than yesterday, pain medication administered as ordered per MAR. Patient demonstrates effective deep breathing and use of incentive spirometer. Patient also reports being more weak than yesterday.       Problem: Pain - Standard  Goal: Alleviation of pain or a reduction in pain to the patient’s comfort goal  Outcome: Not Met

## 2024-09-05 NOTE — OP REPORT
MIKAYLA Siufentes  Facial flushing, nausea and fatigue present 1-2 months  - brief periods of flushing occur at least daily  - around noon today felt warm and flushed, face felt warm to touch. Lasts 1 hour or less then goes away  - concerned that he may have problems with blood sugar, mother is diabetic  - trying to make some dietary changes to see if it improves symptoms  - BP when checking at home occasionally as high as 160's/90s. Typically 130's/70's. The higher BP does not correlate with periods of flushing  - nothing appears to trigger the flushing, not related to food intake or any specific activity  - occasional periods of mild nausea, and daily fatigue started a few weeks after flushing episodes began, uncertain if related    Patient concerned re: possible diabetes/htn. Would like to be seen this week as off of work, advised Dr Brock not in office. Scheduled 4/27/21 @ 10:30 with Maria     Preoperative diagnoses: Spontaneous pneumothorax with emphysematous bleb disease    Postoperative diagnoses: Same    Operation: Thoracoscopic talc and abrasive pleurodesis    Surgeon: Demetrius Mccray MD, FACS    Indications: Mr. Hartman presented with a spontaneous pneumothorax.  He was evaluated at an [facility] hospital where a needle thoracostomy was performed.  He arrived here with a relatively large pneumothorax and an emergent chest tube was placed.  Initially he had a continuous airleak which resolved within 24 hours.  CT scan shows diffuse emphysematous disease with a prominent large right middle lobe bleb.  Procedure alternatives and risks were discussed.  Questions answered and he wished to proceed    Procedure: Following induction of general anesthesia a double-lumen endotracheal tube was placed.  The patient was placed in the decubitus position and the chest prepped and draped in sterile fashion.  The chest tube had been previously removed.  A #11 port was inserted through the chest tube site.  Chest was insufflated with CO2 at 12 mm which provided additional exposure.  An additional 11 port was placed with video assist.  This enabled placement of a catheter and delivery of talc which was delivered throughout the chest.  The lung surface and pleura was effectively talc.  The apex was treated with brace of pleurodesis.  There was no active hemorrhage at the time of closure.  Sponge and needle counts were correct x 2.  A #24 chest tube was placed through the new port site.  The tube was secured using 0 Ethibond suture.  Lung was inflated.  Patient tolerated the procedure well and was taken to the recovery room in stable condition.    Specimens to pathology: None  Condition to PAR: Stable  Estimated blood loss: Less than 5 cc      Demetrius Mccray MD, FACS

## 2024-09-05 NOTE — PROGRESS NOTES
Trauma / Surgical Daily Progress Note    Date of Service  9/5/2024    Chief Complaint  84 y.o. male admitted 9/3/2024 with spontaneous pneumothorax.  Non-trauma.       9/3 Right tube thoracostomy.    Interval Events    Chest tube to suction with no air leak.  AM CXR with no significant pneumothorax.    -OR today for thorascopic pleurodesis. ICU post op.         Review of Systems  Review of Systems   Constitutional:  Negative for chills and fever.   HENT: Negative.     Eyes: Negative.    Respiratory:          Pain with deep inspiration   Cardiovascular: Negative.    Gastrointestinal: Negative.    Genitourinary: Negative.    Musculoskeletal:  Positive for back pain and myalgias.   Skin: Negative.    Neurological: Negative.         Vital Signs  Temp:  [36.4 °C (97.5 °F)-36.8 °C (98.2 °F)] 36.6 °C (97.9 °F)  Pulse:  [62-75] 70  Resp:  [16-18] 18  BP: (110-128)/(60-68) 126/67  SpO2:  [91 %-96 %] 95 %    Physical Exam  Physical Exam  Vitals and nursing note reviewed.   Constitutional:       General: He is awake. He is not in acute distress.     Appearance: He is not ill-appearing, toxic-appearing or diaphoretic.   HENT:      Head: Normocephalic and atraumatic.      Nose: Nose normal.      Mouth/Throat:      Mouth: Mucous membranes are moist.   Eyes:      Conjunctiva/sclera: Conjunctivae normal.   Cardiovascular:      Rate and Rhythm: Normal rate.   Pulmonary:      Effort: No respiratory distress.      Comments: Chest tube to suction with no air leak.    Chest:      Chest wall: Tenderness present.   Abdominal:      General: There is no distension.      Tenderness: There is no abdominal tenderness. There is no guarding or rebound.   Musculoskeletal:      Cervical back: Neck supple.      Comments: Moves all extremities   Skin:     General: Skin is warm and dry.      Capillary Refill: Capillary refill takes less than 2 seconds.   Neurological:      Mental Status: He is alert.      Comments: Conversant   Psychiatric:          Mood and Affect: Mood normal.         Behavior: Behavior is cooperative.         Laboratory  Recent Results (from the past 24 hour(s))   EKG    Collection Time: 24  5:41 PM   Result Value Ref Range    Report       [facility]    Test Date:  2024  Pt Name:    MARIA SAMANIEGO                 Department: 141  MRN:        [id number]                      Room:       [facility]  Gender:     Male                         Technician: ANTONIA  :        1940                   Requested By:VERONIKA MOSQUEDA  Order #:    [id number]                    Reading MD: Robert Mariano MD    Measurements  Intervals                                Axis  Rate:       63                           P:          85  NY:         167                          QRS:        -86  QRSD:       102                          T:          75  QT:         463  QTc:        475    Interpretive Statements  Sinus rhythm  Ventricular premature complex  Left anterior fascicular block  Consider RVH or posterior infarct    Electronically Signed On 2024 23:25:39 PDT by Robert Mariano MD     CBC WITH DIFFERENTIAL    Collection Time: 24  3:01 AM   Result Value Ref Range    WBC 7.4 4.8 - 10.8 K/uL    RBC 4.13 (L) 4.70 - 6.10 M/uL    Hemoglobin 13.2 (L) 14.0 - 18.0 g/dL    Hematocrit 40.9 (L) 42.0 - 52.0 %    MCV 99.0 (H) 81.4 - 97.8 fL    MCH 32.0 27.0 - 33.0 pg    MCHC 32.3 32.3 - 36.5 g/dL    RDW 50.1 (H) 35.9 - 50.0 fL    Platelet Count 226 164 - 446 K/uL    MPV 10.4 9.0 - 12.9 fL    Neutrophils-Polys 75.50 (H) 44.00 - 72.00 %    Lymphocytes 15.30 (L) 22.00 - 41.00 %    Monocytes 8.10 0.00 - 13.40 %    Eosinophils 0.50 0.00 - 6.90 %    Basophils 0.30 0.00 - 1.80 %    Immature Granulocytes 0.30 0.00 - 0.90 %    Nucleated RBC 0.00 0.00 - 0.20 /100 WBC    Neutrophils (Absolute) 5.61 1.82 - 7.42 K/uL    Lymphs (Absolute) 1.14 1.00 - 4.80 K/uL    Monos (Absolute) 0.60 0.00 - 0.85 K/uL    Eos (Absolute) 0.04 0.00 - 0.51 K/uL    Baso (Absolute)  0.02 0.00 - 0.12 K/uL    Immature Granulocytes (abs) 0.02 0.00 - 0.11 K/uL    NRBC (Absolute) 0.00 K/uL   Basic Metabolic Panel    Collection Time: 09/05/24  3:01 AM   Result Value Ref Range    Sodium 138 135 - 145 mmol/L    Potassium 4.3 3.6 - 5.5 mmol/L    Chloride 101 96 - 112 mmol/L    Co2 27 20 - 33 mmol/L    Glucose 97 65 - 99 mg/dL    Bun 15 8 - 22 mg/dL    Creatinine 0.45 (L) 0.50 - 1.40 mg/dL    Calcium 9.0 8.5 - 10.5 mg/dL    Anion Gap 10.0 7.0 - 16.0   Prothrombin Time    Collection Time: 09/05/24  3:01 AM   Result Value Ref Range    PT 17.2 (H) 12.0 - 14.6 sec    INR 1.38 (H) 0.87 - 1.13   APTT    Collection Time: 09/05/24  3:01 AM   Result Value Ref Range    APTT 33.9 24.7 - 36.0 sec   ESTIMATED GFR    Collection Time: 09/05/24  3:01 AM   Result Value Ref Range    GFR (CKD-EPI) 104 >60 mL/min/1.73 m 2       Fluids    Intake/Output Summary (Last 24 hours) at 9/5/2024 0853  Last data filed at 9/5/2024 0750  Gross per 24 hour   Intake 2403.06 ml   Output 295 ml   Net 2108.06 ml       Core Measures & Quality Metrics  Labs reviewed, Medications reviewed and Radiology images reviewed  Frias catheter: No Frias      DVT Prophylaxis: Contraindicated - High bleeding risk (9/5 OR)    Ulcer prophylaxis: Not indicated    Assessed for rehab: Patient was assess for and/or received rehabilitation services during this hospitalization      Assessment/Plan  * Primary spontaneous pneumothorax- (present on admission)  Assessment & Plan  Large spontaneous pneumothorax   Attempted decompression in ED.  Chest tube placed in ED. (+) air leak noted/no drainage. Placed to suction.  9/4 CXR with no pneumothorax.  CT to suction with no air leak.  Continue chest tube to suction.    9/5 OR for thoracoscopic pleurodesis. Bleb resection not indicated.   Daily chest xray's.    Trauma- (present on admission)  Assessment & Plan  Denies trauma      Discussed patient condition with Patient and trauma surgery, Dr. Deemtrius Mccray .

## 2024-09-05 NOTE — PROGRESS NOTES
Bedside report received.  Assessment complete.  A&O x 4. Patient calls appropriately. Patient is Bahamian Speaking only. Family at bedside.   Patient ambulates with a standby assist.   Patient has 3/10 pain. Patient declines interventions at this time.  Denies N&V. Tolerating regular diet. Patient to be NPO at 0000.  Patient has a right chest tube to -20 suction per order. Chest tube patent. No leaks noted at this time.   + void, + flatus, - BM (last bowel movement prior to arrival).  Patient denies SOB. On 3L nasal canula.   SCD's refused. Education provided.     /64   Pulse 66   Temp 36.7 °C (98.1 °F) (Temporal)   Resp 16   Wt 56.7 kg (125 lb)   SpO2 92%     Review plan with of care with patient. Call light and personal belongings within reach. Hourly rounding in place. All needs met at this time.

## 2024-09-05 NOTE — PROGRESS NOTES
Bedside report received.  Assessment complete.  British speaking only.  A&O x 4. Patient calls appropriately.  Patient ambulates with standby assist.    Patient has 8/10 pain. Patient medicated per MAR.  Denies N&V. Tolerating regular diet.  Surgical R chest tube to -20 suction. Subcutaneous emphysema palpated to R axilla next to chest tube site.  + void, + flatus, LBM PTA.  Patient denies SOB.  Family at bedside  Review plan with of care with patient. Call light and personal belongings within reach. Hourly rounding in place. All needs met at this time.

## 2024-09-05 NOTE — ANESTHESIA TIME REPORT
Anesthesia Start and Stop Event Times       Date Time Event    9/5/2024 1352 Anesthesia Start     1518 Anesthesia Stop          Responsible Staff  09/05/24      Name Role Begin End    Yamil Wolf M.D. Anesth 1352 1416    Afua Spain M.D. Anesth 1416 1518          Overtime Reason:  overtime    Comments:

## 2024-09-05 NOTE — PROGRESS NOTES
Small air leak noted at 0513 in the patient's chest tube. Dressing changed per protocol. Patient denies shortness of breath. Patient's pulse oximetry 91% on 3 L nasal canula. Crepitus palpated surrounding the site. Notified [place] Hospitalist Praveena Jason.

## 2024-09-05 NOTE — CARE PLAN
The patient is Stable - Low risk of patient condition declining or worsening    Shift Goals  Clinical Goals: pulmonary hygiene, ambulation  Patient Goals: rest, comfort  Family Goals: Updates    Progress made toward(s) clinical / shift goals: Educated patient on importance of pulmonary hygiene. Patient demonstrated deep breathing and coughing. Patient demonstrated correct use of the IS. Encouraged patient to use the IS ten times per hour. Chest tube assessed throughout shift. Chest tube patent. No air leaks noted at this time. Dressing changes per protocol. Patient ambulated up to bathroom throughout shift. Educated patient on importance of mobility. Patient rested comfortably intermittently throughout shift. Patient and family updated on plan of care.       Problem: Respiratory  Goal: Patient will achieve/maintain optimum respiratory ventilation and gas exchange  Outcome: Progressing     Problem: Mobility  Goal: Patient's capacity to carry out activities will improve  Outcome: Progressing       Patient is not progressing towards the following goals:

## 2024-09-05 NOTE — OR NURSING
Patient arrived to PACU in stable condition.  Oral airway in place and removed upon arrival to PACU  Surgical site to R chest, dressing intact. Chest tube in place to wall suction  Medicated for pain per MAR  Patient tolerated clears without nausea or vomiting  Indonesian speaking staff utilized during assessment  Report given to Katarina DALEY. Patient transferred back to [facility]

## 2024-09-06 ENCOUNTER — APPOINTMENT (OUTPATIENT)
Dept: RADIOLOGY | Facility: MEDICAL CENTER | Age: 84
DRG: 166 | End: 2024-09-06
Attending: HOSPITALIST
Payer: MEDICARE

## 2024-09-06 LAB
ANION GAP SERPL CALC-SCNC: 8 MMOL/L (ref 7–16)
BASOPHILS # BLD AUTO: 0.1 % (ref 0–1.8)
BASOPHILS # BLD: 0.01 K/UL (ref 0–0.12)
BUN SERPL-MCNC: 11 MG/DL (ref 8–22)
CALCIUM SERPL-MCNC: 8.4 MG/DL (ref 8.5–10.5)
CHLORIDE SERPL-SCNC: 99 MMOL/L (ref 96–112)
CO2 SERPL-SCNC: 28 MMOL/L (ref 20–33)
CREAT SERPL-MCNC: 0.49 MG/DL (ref 0.5–1.4)
EOSINOPHIL # BLD AUTO: 0 K/UL (ref 0–0.51)
EOSINOPHIL NFR BLD: 0 % (ref 0–6.9)
ERYTHROCYTE [DISTWIDTH] IN BLOOD BY AUTOMATED COUNT: 47.5 FL (ref 35.9–50)
GFR SERPLBLD CREATININE-BSD FMLA CKD-EPI: 101 ML/MIN/1.73 M 2
GLUCOSE SERPL-MCNC: 115 MG/DL (ref 65–99)
HCT VFR BLD AUTO: 42.3 % (ref 42–52)
HGB BLD-MCNC: 13.7 G/DL (ref 14–18)
IMM GRANULOCYTES # BLD AUTO: 0.02 K/UL (ref 0–0.11)
IMM GRANULOCYTES NFR BLD AUTO: 0.2 % (ref 0–0.9)
LYMPHOCYTES # BLD AUTO: 1.04 K/UL (ref 1–4.8)
LYMPHOCYTES NFR BLD: 11.5 % (ref 22–41)
MCH RBC QN AUTO: 31.8 PG (ref 27–33)
MCHC RBC AUTO-ENTMCNC: 32.4 G/DL (ref 32.3–36.5)
MCV RBC AUTO: 98.1 FL (ref 81.4–97.8)
MONOCYTES # BLD AUTO: 0.67 K/UL (ref 0–0.85)
MONOCYTES NFR BLD AUTO: 7.4 % (ref 0–13.4)
NEUTROPHILS # BLD AUTO: 7.32 K/UL (ref 1.82–7.42)
NEUTROPHILS NFR BLD: 80.8 % (ref 44–72)
NRBC # BLD AUTO: 0 K/UL
NRBC BLD-RTO: 0 /100 WBC (ref 0–0.2)
PLATELET # BLD AUTO: 227 K/UL (ref 164–446)
PMV BLD AUTO: 10.3 FL (ref 9–12.9)
POTASSIUM SERPL-SCNC: 4.4 MMOL/L (ref 3.6–5.5)
RBC # BLD AUTO: 4.31 M/UL (ref 4.7–6.1)
SODIUM SERPL-SCNC: 135 MMOL/L (ref 135–145)
WBC # BLD AUTO: 9.1 K/UL (ref 4.8–10.8)

## 2024-09-06 PROCEDURE — 700102 HCHG RX REV CODE 250 W/ 637 OVERRIDE(OP): Performed by: HOSPITALIST

## 2024-09-06 PROCEDURE — 36415 COLL VENOUS BLD VENIPUNCTURE: CPT

## 2024-09-06 PROCEDURE — 99024 POSTOP FOLLOW-UP VISIT: CPT | Performed by: NURSE PRACTITIONER

## 2024-09-06 PROCEDURE — 85025 COMPLETE CBC W/AUTO DIFF WBC: CPT

## 2024-09-06 PROCEDURE — A9270 NON-COVERED ITEM OR SERVICE: HCPCS | Performed by: HOSPITALIST

## 2024-09-06 PROCEDURE — 99233 SBSQ HOSP IP/OBS HIGH 50: CPT | Performed by: HOSPITALIST

## 2024-09-06 PROCEDURE — 770001 HCHG ROOM/CARE - MED/SURG/GYN PRIV*

## 2024-09-06 PROCEDURE — 80048 BASIC METABOLIC PNL TOTAL CA: CPT

## 2024-09-06 PROCEDURE — 94669 MECHANICAL CHEST WALL OSCILL: CPT

## 2024-09-06 PROCEDURE — 71045 X-RAY EXAM CHEST 1 VIEW: CPT

## 2024-09-06 RX ADMIN — SENNOSIDES AND DOCUSATE SODIUM 2 TABLET: 50; 8.6 TABLET ORAL at 16:16

## 2024-09-06 RX ADMIN — OXYCODONE HYDROCHLORIDE 5 MG: 5 TABLET ORAL at 16:16

## 2024-09-06 RX ADMIN — OXYCODONE HYDROCHLORIDE 10 MG: 10 TABLET ORAL at 04:39

## 2024-09-06 RX ADMIN — POLYETHYLENE GLYCOL 3350 1 PACKET: 17 POWDER, FOR SOLUTION ORAL at 21:07

## 2024-09-06 RX ADMIN — KETOROLAC TROMETHAMINE 1 DROP: 0.5 SOLUTION OPHTHALMIC at 04:39

## 2024-09-06 RX ADMIN — PREDNISOLONE ACETATE 1 DROP: 10 SUSPENSION/ DROPS OPHTHALMIC at 04:39

## 2024-09-06 ASSESSMENT — PAIN DESCRIPTION - PAIN TYPE
TYPE: ACUTE PAIN

## 2024-09-06 ASSESSMENT — ENCOUNTER SYMPTOMS
BACK PAIN: 1
ABDOMINAL PAIN: 0
HEADACHES: 0
NAUSEA: 0
SPEECH CHANGE: 0
SENSORY CHANGE: 0
WEAKNESS: 0
CLAUDICATION: 0
VOMITING: 0
SHORTNESS OF BREATH: 0
NECK PAIN: 0
DEPRESSION: 0
FOCAL WEAKNESS: 0
WHEEZING: 0
GASTROINTESTINAL NEGATIVE: 1
CONSTIPATION: 0
COUGH: 0
PALPITATIONS: 0
DIZZINESS: 0
CARDIOVASCULAR NEGATIVE: 1
FEVER: 0
NEUROLOGICAL NEGATIVE: 1
SORE THROAT: 0
MYALGIAS: 1
BRUISES/BLEEDS EASILY: 0
HEMOPTYSIS: 0
BACK PAIN: 0
LOSS OF CONSCIOUSNESS: 0
SPUTUM PRODUCTION: 0
DIARRHEA: 0
DIAPHORESIS: 0
EYE PAIN: 0
CHILLS: 0
MYALGIAS: 0
EYE DISCHARGE: 0
EYES NEGATIVE: 1

## 2024-09-06 ASSESSMENT — LIFESTYLE VARIABLES: SUBSTANCE_ABUSE: 0

## 2024-09-06 ASSESSMENT — FIBROSIS 4 INDEX: FIB4 SCORE: 2.05

## 2024-09-06 NOTE — CARE PLAN
Problem: Hyperinflation  Goal: Prevent or improve atelectasis  Description: Target End Date:  3 to 4 days    1. Instruct incentive spirometry usage  2.  Perform hyperinflation therapy as indicated  Flowsheets  Taken 9/6/2024 1545  Hyperinflation Protocol Goals/Outcome: Increase and/or stable inspiratory capacity for 24 hours  Taken 9/4/2024 1511  Hyperinflation Protocol Indications: Chest Trauma (Blunt, Penetrative, Crushing, or Surgical)  Note: PEP BID

## 2024-09-06 NOTE — PROGRESS NOTES
Bedside report received.  Assessment complete.  A&O x 4. Patient calls appropriately. Patient is [place] Islander Speaking only. Family at bedside.   Patient ambulates with a standby assist.    Patient has 3/10 pain. Patient declines interventions at this time.   Denies N&V. Tolerating regular diet.  Patient has a right chest tube to -20 suction per order. Chest tube patent. No leaks noted at this time.   + void, + flatus, - BM (last bowel movement prior to arrival).  Patient denies SOB. On 3 L nasal canula.   SCD's refused. Education provided.     Temperature 36.5 C (97.7F) Temporal / Pulse 78 / Respiration 20 / /61/ MAP 80 / Pulse Oximetry 97%     Review plan with of care with patient. Call light and personal belongings within reach. Hourly rounding in place. All needs met at this time.

## 2024-09-06 NOTE — PROGRESS NOTES
Hospital Medicine Daily Progress Note    Date of Service  9/6/2024    Chief Complaint  Joaquín Hartman is a 84 y.o. male admitted 9/3/2024 with spontaneous right pneumothorax.    Hospital Course  Joaquín Hartman is a Luxembourgish speaking 84 y.o. male w/ a hx of COPD and prior smoker who presented 9/3/2024 with acute shortness of breath to a medical facility in [facility].  He was found to have a right sided large pneumothorax and medically air flown to [facility] after chest intracatheter placement.   Here he received an emergent chest tube with return of right sided lung appearance on follow up xray.  He denies recent trauma.   Likely spontaneous pneumothorax is from a pulmonary emphysematous bleb that ruptured.    Interval Problem Update  9/4: Patient states he is having pain.  I did use a .  He seems clinically dehydrated.  Therefore increased IV fluids from 75 an hour to 125 an hour.  He is currently not eating due to pain.  Increased his pain medications.  N.p.o. at midnight for thoracoscopy and pleurodesis in AM.  9/5:  successful thoracoscopy and pleurodesis performed.  In PACU doing well post op.  9/6:  used  on phone line.  States he is having minimal pain right chest.  No SOB, remains on 2 LPM NC.  Spoke with grand-daughter on phone to give update.  Likely once chest tube is clamped and then subsequently removed he can return home the next 2 or 3 days.  PT OT is pending.  Patient was baseline independent prior to admission.  Discontinued IV fluids due to minimal pleural effusions bilateral noted on today's chest x-ray.    I have discussed this patient's plan of care and discharge plan at IDT rounds today with Case Management, Nursing, Nursing leadership, and other members of the IDT team.    Consultants/Specialty  general surgery    Code Status  Full Code    Disposition  The patient is not medically cleared for discharge to home or a post-acute facility.  Anticipate discharge to:  home with organized home healthcare and close outpatient follow-up    I have placed the appropriate orders for post-discharge needs.    Review of Systems  Review of Systems   Constitutional:  Negative for chills, diaphoresis, fever and malaise/fatigue.   HENT:  Negative for congestion and sore throat.    Eyes:  Negative for pain and discharge.   Respiratory:  Negative for cough, hemoptysis, sputum production, shortness of breath and wheezing.    Cardiovascular:  Positive for chest pain. Negative for palpitations, claudication and leg swelling.   Gastrointestinal:  Negative for abdominal pain, constipation, diarrhea, melena, nausea and vomiting.   Genitourinary:  Negative for dysuria, frequency and urgency.   Musculoskeletal:  Negative for back pain, joint pain, myalgias and neck pain.   Skin:  Negative for itching and rash.   Neurological:  Negative for dizziness, sensory change, speech change, focal weakness, loss of consciousness, weakness and headaches.   Endo/Heme/Allergies:  Does not bruise/bleed easily.   Psychiatric/Behavioral:  Negative for depression, substance abuse and suicidal ideas.         Physical Exam  Temp:  [35.9 °C (96.7 °F)-37 °C (98.6 °F)] 36.8 °C (98.2 °F)  Pulse:  [73-91] 75  Resp:  [12-20] 16  BP: ()/(51-80) 101/52  SpO2:  [87 %-100 %] 93 %    Physical Exam  Constitutional:       General: He is not in acute distress.     Appearance: He is not diaphoretic.      Comments: Frail, rib protuberance.   HENT:      Head: Normocephalic and atraumatic.      Mouth/Throat:      Mouth: Mucous membranes are dry.      Pharynx: No oropharyngeal exudate.   Eyes:      General: No scleral icterus.        Right eye: No discharge.         Left eye: No discharge.      Conjunctiva/sclera: Conjunctivae normal.      Pupils: Pupils are equal, round, and reactive to light.   Neck:      Thyroid: No thyromegaly.      Vascular: No JVD.      Trachea: No tracheal deviation.   Cardiovascular:      Rate and Rhythm:  Normal rate and regular rhythm.      Heart sounds: Normal heart sounds. No murmur heard.     No friction rub. No gallop.   Pulmonary:      Effort: Pulmonary effort is normal. No respiratory distress.      Breath sounds: Normal breath sounds. No wheezing or rales.      Comments: Right chest tube to suction.  Chest:      Chest wall: No tenderness.   Abdominal:      General: Bowel sounds are normal. There is no distension.      Palpations: Abdomen is soft. There is no mass.      Tenderness: There is no abdominal tenderness. There is no guarding or rebound.   Musculoskeletal:         General: No tenderness. Normal range of motion.      Cervical back: Normal range of motion and neck supple.   Lymphadenopathy:      Cervical: No cervical adenopathy.   Skin:     General: Skin is warm and dry.      Capillary Refill: Capillary refill takes less than 2 seconds.      Findings: No erythema or rash.   Neurological:      General: No focal deficit present.      Mental Status: He is alert and oriented to person, place, and time.      Cranial Nerves: No cranial nerve deficit.      Motor: No abnormal muscle tone.   Psychiatric:         Mood and Affect: Mood normal.         Behavior: Behavior normal.         Thought Content: Thought content normal.         Judgment: Judgment normal.         Fluids    Intake/Output Summary (Last 24 hours) at 9/6/2024 1506  Last data filed at 9/6/2024 1114  Gross per 24 hour   Intake 1772.5 ml   Output 1074 ml   Net 698.5 ml        Laboratory  Recent Labs     09/04/24 0248 09/05/24  0301 09/06/24  0204   WBC 12.5* 7.4 9.1   RBC 4.34* 4.13* 4.31*   HEMOGLOBIN 13.8* 13.2* 13.7*   HEMATOCRIT 42.2 40.9* 42.3   MCV 97.2 99.0* 98.1*   MCH 31.8 32.0 31.8   MCHC 32.7 32.3 32.4   RDW 48.6 50.1* 47.5   PLATELETCT 247 226 227   MPV 10.1 10.4 10.3     Recent Labs     09/04/24 0248 09/05/24  0301 09/06/24  0204   SODIUM 141 138 135   POTASSIUM 4.7 4.3 4.4   CHLORIDE 106 101 99   CO2 24 27 28   GLUCOSE 110* 97 115*    BUN 11 15 11   CREATININE 0.45* 0.45* 0.49*   CALCIUM 9.4 9.0 8.4*     Recent Labs     09/05/24  0301   APTT 33.9   INR 1.38*               Imaging  DX-CHEST-PORTABLE (1 VIEW)   Final Result         1.  Interstitial pulmonary parenchymal prominence suggest chronic underlying lung disease, component of interstitial edema and/or infiltrates not excluded.   2.  Trace bilateral pleural effusions, new since prior study.   3.  Atherosclerosis      DX-CHEST-PORTABLE (1 VIEW)   Final Result         1.  Interstitial pulmonary parenchymal prominence suggest chronic underlying lung disease, component of interstitial edema and/or infiltrates not excluded.      DX-CHEST-PORTABLE (1 VIEW)   Final Result      1.  Presents right chest tube      2.  Tiny right pneumothorax      CT-CHEST (THORAX) WITH   Final Result         1. Interval placement of right chest tube with small residual right pneumothorax.      2. Emphysematous changes with large bullae in the right middle lobe.   3. Patchy right basilar atelectasis.         DX-CHEST-PORTABLE (1 VIEW)   Final Result         No appreciable pneumothorax after right chest tube placement.      DX-CHEST-PORTABLE (1 VIEW)   Final Result      Stable large right pneumothorax.      DX-CHEST-PORTABLE (1 VIEW)   Final Result         Large right pneumothorax with collapsing of the right lung.            CRITICAL RESULT READ BACK: Preliminary findings discussed with and critical read back performed by Dr. VALERIA BOLANOS in the Emergency Department via telephone on 9/3/2024 12:08 PM           Assessment/Plan  * Primary spontaneous pneumothorax- (present on admission)  Assessment & Plan  Spontaneous likely due to bleb from emphysema and prior years of smoking.  S/P chest tube after initial catheter at OSH failed.  Monitor pleurovac + air leak, chest tube placed to suction.  Supplemental oxygen to keep SpO2 >89%  RT per protocol   Surgery consulting   Plan for thoracoscopy and pleurodesis 9/5. NPO at  [place].  Increased pain control    Dehydration- (present on admission)  Assessment & Plan  Clinically dry mucus membranes, poor skin turgor.  Increased NS 75 to 125/hr.   Not currently eating, despite patient stating he ate lunch.  Nothing was eaten off his lunch tray.    Hyperglycemia  Assessment & Plan  Monitor labs    Elevated alkaline phosphatase level  Assessment & Plan  Alk Phos:178  Needs outpatient work up.  No current abdominal pain.    Hypokalemia  Assessment & Plan  9/3 K:3.5  Supplement and monitor  Check Mg    COPD (chronic obstructive pulmonary disease) (HCC)- (present on admission)  Assessment & Plan  No exacerbation  RT per protocol  Monitor SpO2 and titrate oxygen to keep >89%  Nebulizer and inhalers    Leukocytosis  Assessment & Plan  Likely leukemoid reaction  Monitor am CBC and vitals.  Hold antibiotics for now.         VTE prophylaxis:    Xarelto 10mg daily as prophylaxis      I have performed a physical exam and reviewed and updated ROS and Plan today (9/6/2024). In review of yesterday's note (9/5/2024), there are no changes except as documented above.

## 2024-09-06 NOTE — DOCUMENTATION QUERY
"                                                                         [facility]                                                                       Query Response Note      PATIENT:               MARIA SAMANIEGO  ACCT #:                  [id number]  MRN:                     [id number]  :                      1940  ADMIT DATE:       9/3/2024 11:42 AM  DISCH DATE:          RESPONDING  PROVIDER #:        [id number]           QUERY TEXT:    Shortness of breath, use of supplemental oxygen, and oxygen saturation <90% are documented in the medical record.   Can clarification of the findings be determined?    The patient's Clinical Indicators include:  83yo with dx of spontaneous pneumothorax, emphysema, leukemoid reaction     ED provider note \"...having significantly labored respirations\"   H&P \"Positive for shortness of breath\"     Epic VS 88% -96 % O2 sat on 1-15L nonrebreather, 16-31 RR     Risk factors: advance age, spontaneous pneumothorax, emphysema  Treatments: supplemental oxygen, chest tube, imaging, labwork, monitoring    If you agree with the above dx please document in the medical record.     Contact me with questions.     Thank you,  SAMUEL Patten, CDI  [email or web address]  Options provided:   -- Acute respiratory failure with hypoxia   -- Hypoxia   -- Other explanation:other explanation-##please specify, please specify   -- Unable to determine      Query created by: Carla Segundo on 2024 8:48 AM    RESPONSE TEXT:    Acute respiratory failure with hypoxia          Electronically signed by:  JUANA HAGAN MD 2024 6:44 AM              "

## 2024-09-06 NOTE — CARE PLAN
The patient is Stable - Low risk of patient condition declining or worsening    Shift Goals  Clinical Goals: Pain management, chest tube maintenance, pulmonary hygiene, ambulation, rest  Patient Goals: Rest  Family Goals: Updates    Progress made toward(s) clinical / shift goals:  Patient denies pain and declines intervention at this time. Chest tube remains to -20 suction, no air leak present, chest tube patent, subcutaneous air noted. Patient demonstrates effective use of the incentive spirometer and deep breathing. Patient is resting at this time. Family updated at bedside this morning.    Patient is not progressing towards the following goals:

## 2024-09-06 NOTE — DIETARY
Nutrition Services: Initial Assessment     Day 3 of admit. Joaquín Hartman is a 84 y.o. male with admitting DX of Pneumothorax on right [J93.9]     Nutrition Assessment:      Weight: 47.8 kg (105 lb 6.1 oz)  Weight taken via standing scale  There is no height or weight on file to calculate BMI. BMI classification: unknown  Wt Readings from Last 6 Encounters:   09/06/24 47.8 kg (105 lb 6.1 oz)      Objective:  Pertinent medical hx: History reviewed. No pertinent past medical history.  Pertinent labs:   Lab Results   Component Value Date/Time    SODIUM 135 09/06/2024 02:04 AM    POTASSIUM 4.4 09/06/2024 02:04 AM    CO2 28 09/06/2024 02:04 AM    CHLORIDE 99 09/06/2024 02:04 AM    BUN 11 09/06/2024 02:04 AM    CREATININE 0.49 (L) 09/06/2024 02:04 AM    CALCIUM 8.4 (L) 09/06/2024 02:04 AM    MAGNESIUM 1.8 09/04/2024 02:48 AM    GLUCOSE 115 (H) 09/06/2024 02:04 AM      Pertinent meds:   Scheduled Medications   Medication Dose Frequency    ketorolac  1 Drop DAILY    prednisoLONE acetate  1 Drop DAILY    [Held by provider] rivaroxaban  10 mg DAILY AT 1800    Pharmacy Consult Request  1 Each PHARMACY TO DOSE    senna-docusate  2 Tablet Q EVENING      Skin/wounds: no pressure injuries noted   Food Allergies: NKFA  Last BM:  (PTA) per flowsheets     Current diet order:   Regular diet    Subjective:   Pt with staff x2 attempted visits, unable to revisit pt today. 50-75% x 2 meals recorded per flowsheets. Lunch tray untouched at bedside upon visit.     Nutrition Focused Physical Exam (NFPE)   Pt appears visibly thin and has a low weight, unable to perform NFPE today  Fluid Accumulation: none noted  Reduced  Strength: N/A in acute care setting.     Nutrition Diagnosis:       Unable to fully assess for malnutrition at this time. RD to follow up as able.      Nutrition Interventions:      Recommend Ensure Plus (provides 350 calories, 13 g protein per 8 fl oz) TID.  Obtain admit height - requested from RN via Voalte  Patient aware  of active plan of care as appropriate.     Nutrition Monitoring and Evaluation:     Monitor nutrition POC  Monitor vital signs pertinent to nutrition       RD following and will provide updated recommendations as indicated.     Emelina Adler R.D.

## 2024-09-06 NOTE — CARE PLAN
The patient is Stable - Low risk of patient condition declining or worsening    Shift Goals  Clinical Goals: pulmonary hygiene, ambulation  Patient Goals: rest, comfort  Family Goals: Updates    Progress made toward(s) clinical / shift goals: Educated patient on the importance of pulmonary hygiene. Patient demonstrated deep breathing and coughing. Patient demonstrated correct use of the IS. Educated patient to use the IS ten times per hour. Educated patient about the importance of ambulation. Patient declining to ambulate at this time. Patient rested comfortably intermittently throughout shift. Patient and family updated on plan of care.      Problem: Respiratory  Goal: Patient will achieve/maintain optimum respiratory ventilation and gas exchange  Outcome: Progressing     Problem: Mobility  Goal: Patient's capacity to carry out activities will improve  Outcome: Progressing       Patient is not progressing towards the following goals:

## 2024-09-06 NOTE — PROGRESS NOTES
Trauma / Surgical Daily Progress Note    Date of Service  9/6/2024    Chief Complaint  84 y.o. male admitted 9/3/2024 with spontaneous pneumothorax. Non-trauma.    9/3 Right tube thoracostomy  9/5 Thoracoscopic talc and abrasive pleurodesis     Interval Events    Chest tube to suction with no air leak and scant output.  CXR with no significant pneumothorax.     - PO day # 1  pleurodesis. Continue chest tube to suction.  AM CXR.  - Okay to resume Xarelto from surgical perspective.    Review of Systems  Review of Systems   Constitutional:  Negative for chills and fever.   HENT: Negative.     Eyes: Negative.    Respiratory:          Pain with deep inspiration   Cardiovascular: Negative.    Gastrointestinal: Negative.    Genitourinary: Negative.    Musculoskeletal:  Positive for back pain and myalgias.   Skin: Negative.    Neurological: Negative.         Vital Signs  Temp:  [35.6 °C (96 °F)-37 °C (98.6 °F)] 36.6 °C (97.9 °F)  Pulse:  [69-91] 76  Resp:  [12-20] 16  BP: ()/(51-80) 104/51  SpO2:  [87 %-100 %] 94 %    Physical Exam  Physical Exam  Vitals and nursing note reviewed.   Constitutional:       General: He is awake. He is not in acute distress.     Appearance: He is not ill-appearing, toxic-appearing or diaphoretic.      Comments: Elderly and deconditioned   HENT:      Head: Normocephalic and atraumatic.      Nose: Nose normal.      Mouth/Throat:      Mouth: Mucous membranes are moist.   Eyes:      Conjunctiva/sclera: Conjunctivae normal.   Cardiovascular:      Rate and Rhythm: Normal rate.   Pulmonary:      Effort: No respiratory distress.      Comments: Chest tube to suction with no air leak.    Chest:      Chest wall: Tenderness present.   Abdominal:      General: There is no distension.      Tenderness: There is no abdominal tenderness. There is no guarding or rebound.   Musculoskeletal:      Cervical back: Neck supple.      Comments: Moves all extremities   Skin:     General: Skin is warm and dry.       Capillary Refill: Capillary refill takes less than 2 seconds.   Neurological:      Mental Status: He is alert.      Comments: Conversant   Psychiatric:         Mood and Affect: Mood normal.         Behavior: Behavior is cooperative.         Laboratory  Recent Results (from the past 24 hour(s))   Basic Metabolic Panel    Collection Time: 09/06/24  2:04 AM   Result Value Ref Range    Sodium 135 135 - 145 mmol/L    Potassium 4.4 3.6 - 5.5 mmol/L    Chloride 99 96 - 112 mmol/L    Co2 28 20 - 33 mmol/L    Glucose 115 (H) 65 - 99 mg/dL    Bun 11 8 - 22 mg/dL    Creatinine 0.49 (L) 0.50 - 1.40 mg/dL    Calcium 8.4 (L) 8.5 - 10.5 mg/dL    Anion Gap 8.0 7.0 - 16.0   CBC WITH DIFFERENTIAL    Collection Time: 09/06/24  2:04 AM   Result Value Ref Range    WBC 9.1 4.8 - 10.8 K/uL    RBC 4.31 (L) 4.70 - 6.10 M/uL    Hemoglobin 13.7 (L) 14.0 - 18.0 g/dL    Hematocrit 42.3 42.0 - 52.0 %    MCV 98.1 (H) 81.4 - 97.8 fL    MCH 31.8 27.0 - 33.0 pg    MCHC 32.4 32.3 - 36.5 g/dL    RDW 47.5 35.9 - 50.0 fL    Platelet Count 227 164 - 446 K/uL    MPV 10.3 9.0 - 12.9 fL    Neutrophils-Polys 80.80 (H) 44.00 - 72.00 %    Lymphocytes 11.50 (L) 22.00 - 41.00 %    Monocytes 7.40 0.00 - 13.40 %    Eosinophils 0.00 0.00 - 6.90 %    Basophils 0.10 0.00 - 1.80 %    Immature Granulocytes 0.20 0.00 - 0.90 %    Nucleated RBC 0.00 0.00 - 0.20 /100 WBC    Neutrophils (Absolute) 7.32 1.82 - 7.42 K/uL    Lymphs (Absolute) 1.04 1.00 - 4.80 K/uL    Monos (Absolute) 0.67 0.00 - 0.85 K/uL    Eos (Absolute) 0.00 0.00 - 0.51 K/uL    Baso (Absolute) 0.01 0.00 - 0.12 K/uL    Immature Granulocytes (abs) 0.02 0.00 - 0.11 K/uL    NRBC (Absolute) 0.00 K/uL   ESTIMATED GFR    Collection Time: 09/06/24  2:04 AM   Result Value Ref Range    GFR (CKD-EPI) 101 >60 mL/min/1.73 m 2       Fluids    Intake/Output Summary (Last 24 hours) at 9/6/2024 0905  Last data filed at 9/6/2024 0715  Gross per 24 hour   Intake 1740 ml   Output 1249 ml   Net 491 ml       Core Measures &  Quality Metrics  Labs reviewed, Medications reviewed and Radiology images reviewed  Frias catheter: No Frias      DVT: 9/6 okay to resume Xarelto from surgical perspective.    Ulcer prophylaxis: Not indicated    Assessed for rehab: Patient was assess for and/or received rehabilitation services during this hospitalization      * Primary spontaneous pneumothorax- (present on admission)  Assessment & Plan  Large spontaneous pneumothorax   Attempted decompression in ED.  Chest tube placed in ED. (+) air leak noted/no drainage. Placed to suction.  9/4 CXR with no pneumothorax.  CT to suction with no air leak.  Continue chest tube to suction.    9/5 OR for thoracoscopic talc and abrasive pleurodesis. Bleb resection not indicated.   Daily chest xray's.    Trauma- (present on admission)  Assessment & Plan  Denies trauma      Discussed patient condition with Patient and trauma surgery, Dr. Demetrius Mccray..

## 2024-09-06 NOTE — ANESTHESIA POSTPROCEDURE EVALUATION
Patient: Joaquín Hartman    Procedure Summary       Date: 09/05/24 Room / Location: [facility] 04 / SURGERY [facility]    Anesthesia Start: 1352 Anesthesia Stop: 1518    Procedure: VATS, WITH PLEURODESIS (Right: Chest) Diagnosis: (EMPHYSEMA)    Surgeons: Demetrius Mccray M.D. Responsible Provider: Afua Spain M.D.    Anesthesia Type: general ASA Status: 2            Final Anesthesia Type: general  Last vitals  BP   Blood Pressure : 111/55    Temp   36.4 °C (97.5 °F)    Pulse   76   Resp   18    SpO2   96 %      Anesthesia Post Evaluation    Patient location during evaluation: PACU  Patient participation: complete - patient participated  Level of consciousness: awake and alert    Airway patency: patent  Anesthetic complications: no  Cardiovascular status: hemodynamically stable  Respiratory status: acceptable  Hydration status: euvolemic    PONV: none          No notable events documented.     Nurse Pain Score: 7 (NPRS)

## 2024-09-06 NOTE — PROGRESS NOTES
Bedside report received.  Assessment complete.  Montserratian speaking only.  A&O x 4. Patient calls appropriately.  Patient ambulates with standby assist.    Patient has 0/10 pain.   Denies N&V. Regular diet ordered.  Surgical R chest tube to -20 suction. Subcutaneous emphysema noted with palpitation.  + void, + flatus, LBM PTA.  Patient denies SOB.  Family at bedside  Review plan with of care with patient. Call light and personal belongings within reach. Hourly rounding in place. All needs met at this time.

## 2024-09-07 ENCOUNTER — APPOINTMENT (OUTPATIENT)
Dept: RADIOLOGY | Facility: MEDICAL CENTER | Age: 84
DRG: 166 | End: 2024-09-07
Attending: HOSPITALIST
Payer: MEDICARE

## 2024-09-07 LAB
ANION GAP SERPL CALC-SCNC: 11 MMOL/L (ref 7–16)
BASOPHILS # BLD AUTO: 0.2 % (ref 0–1.8)
BASOPHILS # BLD: 0.02 K/UL (ref 0–0.12)
BUN SERPL-MCNC: 18 MG/DL (ref 8–22)
CALCIUM SERPL-MCNC: 9.2 MG/DL (ref 8.5–10.5)
CHLORIDE SERPL-SCNC: 99 MMOL/L (ref 96–112)
CO2 SERPL-SCNC: 26 MMOL/L (ref 20–33)
CREAT SERPL-MCNC: 0.64 MG/DL (ref 0.5–1.4)
EOSINOPHIL # BLD AUTO: 0.02 K/UL (ref 0–0.51)
EOSINOPHIL NFR BLD: 0.2 % (ref 0–6.9)
ERYTHROCYTE [DISTWIDTH] IN BLOOD BY AUTOMATED COUNT: 49.2 FL (ref 35.9–50)
GFR SERPLBLD CREATININE-BSD FMLA CKD-EPI: 94 ML/MIN/1.73 M 2
GLUCOSE SERPL-MCNC: 112 MG/DL (ref 65–99)
HCT VFR BLD AUTO: 39.5 % (ref 42–52)
HGB BLD-MCNC: 13.2 G/DL (ref 14–18)
IMM GRANULOCYTES # BLD AUTO: 0.03 K/UL (ref 0–0.11)
IMM GRANULOCYTES NFR BLD AUTO: 0.3 % (ref 0–0.9)
LYMPHOCYTES # BLD AUTO: 1.53 K/UL (ref 1–4.8)
LYMPHOCYTES NFR BLD: 15.9 % (ref 22–41)
MCH RBC QN AUTO: 33 PG (ref 27–33)
MCHC RBC AUTO-ENTMCNC: 33.4 G/DL (ref 32.3–36.5)
MCV RBC AUTO: 98.8 FL (ref 81.4–97.8)
MONOCYTES # BLD AUTO: 0.86 K/UL (ref 0–0.85)
MONOCYTES NFR BLD AUTO: 9 % (ref 0–13.4)
NEUTROPHILS # BLD AUTO: 7.14 K/UL (ref 1.82–7.42)
NEUTROPHILS NFR BLD: 74.4 % (ref 44–72)
NRBC # BLD AUTO: 0 K/UL
NRBC BLD-RTO: 0 /100 WBC (ref 0–0.2)
PLATELET # BLD AUTO: 218 K/UL (ref 164–446)
PMV BLD AUTO: 10.3 FL (ref 9–12.9)
POTASSIUM SERPL-SCNC: 4.1 MMOL/L (ref 3.6–5.5)
RBC # BLD AUTO: 4 M/UL (ref 4.7–6.1)
SODIUM SERPL-SCNC: 136 MMOL/L (ref 135–145)
WBC # BLD AUTO: 9.6 K/UL (ref 4.8–10.8)

## 2024-09-07 PROCEDURE — 99024 POSTOP FOLLOW-UP VISIT: CPT | Performed by: NURSE PRACTITIONER

## 2024-09-07 PROCEDURE — 85025 COMPLETE CBC W/AUTO DIFF WBC: CPT

## 2024-09-07 PROCEDURE — 700102 HCHG RX REV CODE 250 W/ 637 OVERRIDE(OP): Performed by: HOSPITALIST

## 2024-09-07 PROCEDURE — A9270 NON-COVERED ITEM OR SERVICE: HCPCS | Performed by: HOSPITALIST

## 2024-09-07 PROCEDURE — 71045 X-RAY EXAM CHEST 1 VIEW: CPT

## 2024-09-07 PROCEDURE — 80048 BASIC METABOLIC PNL TOTAL CA: CPT

## 2024-09-07 PROCEDURE — 770001 HCHG ROOM/CARE - MED/SURG/GYN PRIV*

## 2024-09-07 PROCEDURE — 99232 SBSQ HOSP IP/OBS MODERATE 35: CPT | Performed by: HOSPITALIST

## 2024-09-07 PROCEDURE — 36415 COLL VENOUS BLD VENIPUNCTURE: CPT

## 2024-09-07 RX ORDER — OXYCODONE HYDROCHLORIDE 5 MG/1
5 TABLET ORAL
Status: DISCONTINUED | OUTPATIENT
Start: 2024-09-07 | End: 2024-09-09

## 2024-09-07 RX ORDER — OXYCODONE HYDROCHLORIDE 5 MG/1
2.5 TABLET ORAL
Status: DISCONTINUED | OUTPATIENT
Start: 2024-09-07 | End: 2024-09-09

## 2024-09-07 RX ADMIN — SENNOSIDES AND DOCUSATE SODIUM 2 TABLET: 50; 8.6 TABLET ORAL at 17:25

## 2024-09-07 RX ADMIN — KETOROLAC TROMETHAMINE 1 DROP: 0.5 SOLUTION OPHTHALMIC at 04:59

## 2024-09-07 RX ADMIN — RIVAROXABAN 10 MG: 10 TABLET, FILM COATED ORAL at 17:25

## 2024-09-07 RX ADMIN — OXYCODONE HYDROCHLORIDE 5 MG: 5 TABLET ORAL at 03:38

## 2024-09-07 RX ADMIN — PREDNISOLONE ACETATE 1 DROP: 10 SUSPENSION/ DROPS OPHTHALMIC at 04:59

## 2024-09-07 ASSESSMENT — PAIN DESCRIPTION - PAIN TYPE
TYPE: ACUTE PAIN

## 2024-09-07 ASSESSMENT — ENCOUNTER SYMPTOMS
BACK PAIN: 0
ABDOMINAL PAIN: 0
PALPITATIONS: 0
DIARRHEA: 0
VOMITING: 0
DIZZINESS: 0
MYALGIAS: 0
FEVER: 0
FOCAL WEAKNESS: 0
SPUTUM PRODUCTION: 0
SPEECH CHANGE: 0
NECK PAIN: 0
WEAKNESS: 0
CONSTIPATION: 0
BACK PAIN: 1
SORE THROAT: 0
LOSS OF CONSCIOUSNESS: 0
EYE PAIN: 0
HEADACHES: 0
GASTROINTESTINAL NEGATIVE: 1
SHORTNESS OF BREATH: 0
WHEEZING: 0
NEUROLOGICAL NEGATIVE: 1
EYES NEGATIVE: 1
COUGH: 0
SENSORY CHANGE: 0
CHILLS: 0
MYALGIAS: 1
DEPRESSION: 0
BRUISES/BLEEDS EASILY: 0
HEMOPTYSIS: 0
EYE DISCHARGE: 0
DIAPHORESIS: 0
NAUSEA: 0
CARDIOVASCULAR NEGATIVE: 1
CLAUDICATION: 0

## 2024-09-07 ASSESSMENT — LIFESTYLE VARIABLES: SUBSTANCE_ABUSE: 0

## 2024-09-07 ASSESSMENT — FIBROSIS 4 INDEX: FIB4 SCORE: 2.05

## 2024-09-07 NOTE — PROGRESS NOTES
Report received from previous shift RN.  Assessment complete.  Patient resting in bed comfortably, even and unlabored breathing present.  Chest tube to R flank, -20 suction, CDI.   Patient up to chair for meals. IS in use.   Bed alarm in place.  All needs met at this time. Call light within reach. Hourly rounding in place.

## 2024-09-07 NOTE — PROGRESS NOTES
"Bedside report received.  Assessment complete.  A&O x 4. Patient calls appropriately. Patient is Malian Speaking only. Family at bedside.  services used as needed.   Patient ambulates with a standby assist.    Patient has 3/10 pain. Patient declines interventions at this time.   Denies N&V. Tolerating regular diet.  Patient has a right chest tube to -20 suction per order. Chest tube patent. No leaks noted at this time.   + void, + flatus, - BM (last bowel movement prior to arrival).  Patient denies SOB. On 5 L nasal cannula.   SCD's on.    /61   Pulse 87   Temp 36.6 °C (97.9 °F) (Temporal)   Resp 16   Ht 1.68 m (5' 6.14\")   Wt 47.8 kg (105 lb 6.1 oz)   SpO2 95%   BMI 16.94 kg/m²     Review plan with of care with patient. Call light and personal belongings within reach. Hourly rounding in place. All needs met at this time.  "

## 2024-09-07 NOTE — PROGRESS NOTES
Trauma / Surgical Daily Progress Note    Date of Service  9/7/2024    Chief Complaint  84 y.o. male admitted 9/3/2024 with spontaneous pneumothorax. Non-trauma.    9/3 Right tube thoracostomy  9/5 Thoracoscopic talc and abrasive pleurodesis     Interval Events    Chest tube to suction with no airleak and scant output.  Chest x-ray imaging with trace right apical pneumothorax.  Xarelto.     -Continue chest tube to suction.    Review of Systems  Review of Systems   Constitutional:  Negative for chills and fever.   HENT: Negative.     Eyes: Negative.    Respiratory:          Pain with deep inspiration   Cardiovascular: Negative.    Gastrointestinal: Negative.    Genitourinary: Negative.    Musculoskeletal:  Positive for back pain and myalgias.   Skin: Negative.    Neurological: Negative.       Vital Signs  Temp:  [36.6 °C (97.9 °F)-37.1 °C (98.8 °F)] 36.6 °C (97.9 °F)  Pulse:  [84-98] 84  Resp:  [12-18] 17  BP: (101-132)/(57-66) 119/64  SpO2:  [91 %-95 %] 93 %    Physical Exam  Physical Exam  Vitals and nursing note reviewed.   Constitutional:       General: He is awake. He is not in acute distress.     Appearance: He is not ill-appearing, toxic-appearing or diaphoretic.      Comments: Elderly and deconditioned   HENT:      Head: Normocephalic and atraumatic.      Nose: Nose normal.      Mouth/Throat:      Mouth: Mucous membranes are moist.   Eyes:      Conjunctiva/sclera: Conjunctivae normal.   Cardiovascular:      Rate and Rhythm: Normal rate.   Pulmonary:      Effort: No respiratory distress.      Comments: Chest tube to suction with no air leak.    Chest:      Chest wall: Tenderness present.   Abdominal:      General: There is no distension.      Tenderness: There is no abdominal tenderness. There is no guarding or rebound.   Musculoskeletal:      Cervical back: Neck supple.      Comments: Moves all extremities   Skin:     General: Skin is warm and dry.      Capillary Refill: Capillary refill takes less than 2  seconds.   Neurological:      Mental Status: He is alert.      Comments: Conversant   Psychiatric:         Mood and Affect: Mood normal.         Behavior: Behavior is cooperative.         Laboratory  Recent Results (from the past 24 hour(s))   Basic Metabolic Panel    Collection Time: 09/07/24  3:32 AM   Result Value Ref Range    Sodium 136 135 - 145 mmol/L    Potassium 4.1 3.6 - 5.5 mmol/L    Chloride 99 96 - 112 mmol/L    Co2 26 20 - 33 mmol/L    Glucose 112 (H) 65 - 99 mg/dL    Bun 18 8 - 22 mg/dL    Creatinine 0.64 0.50 - 1.40 mg/dL    Calcium 9.2 8.5 - 10.5 mg/dL    Anion Gap 11.0 7.0 - 16.0   CBC WITH DIFFERENTIAL    Collection Time: 09/07/24  3:32 AM   Result Value Ref Range    WBC 9.6 4.8 - 10.8 K/uL    RBC 4.00 (L) 4.70 - 6.10 M/uL    Hemoglobin 13.2 (L) 14.0 - 18.0 g/dL    Hematocrit 39.5 (L) 42.0 - 52.0 %    MCV 98.8 (H) 81.4 - 97.8 fL    MCH 33.0 27.0 - 33.0 pg    MCHC 33.4 32.3 - 36.5 g/dL    RDW 49.2 35.9 - 50.0 fL    Platelet Count 218 164 - 446 K/uL    MPV 10.3 9.0 - 12.9 fL    Neutrophils-Polys 74.40 (H) 44.00 - 72.00 %    Lymphocytes 15.90 (L) 22.00 - 41.00 %    Monocytes 9.00 0.00 - 13.40 %    Eosinophils 0.20 0.00 - 6.90 %    Basophils 0.20 0.00 - 1.80 %    Immature Granulocytes 0.30 0.00 - 0.90 %    Nucleated RBC 0.00 0.00 - 0.20 /100 WBC    Neutrophils (Absolute) 7.14 1.82 - 7.42 K/uL    Lymphs (Absolute) 1.53 1.00 - 4.80 K/uL    Monos (Absolute) 0.86 (H) 0.00 - 0.85 K/uL    Eos (Absolute) 0.02 0.00 - 0.51 K/uL    Baso (Absolute) 0.02 0.00 - 0.12 K/uL    Immature Granulocytes (abs) 0.03 0.00 - 0.11 K/uL    NRBC (Absolute) 0.00 K/uL   ESTIMATED GFR    Collection Time: 09/07/24  3:32 AM   Result Value Ref Range    GFR (CKD-EPI) 94 >60 mL/min/1.73 m 2       Fluids    Intake/Output Summary (Last 24 hours) at 9/7/2024 1203  Last data filed at 9/7/2024 0729  Gross per 24 hour   Intake 240 ml   Output 481 ml   Net -241 ml       Core Measures & Quality Metrics  Labs reviewed, Medications reviewed and  Radiology images reviewed  Frias catheter: No Frias      DVT: Xarelto.    Ulcer prophylaxis: Not indicated    Assessed for rehab: Patient was assess for and/or received rehabilitation services during this hospitalization      Assessment/Plan  * Primary spontaneous pneumothorax- (present on admission)  Assessment & Plan  Large spontaneous pneumothorax   Attempted decompression in ED.  Chest tube placed in ED. (+) air leak noted/no drainage. Placed to suction.  9/4 CXR with no pneumothorax.  CT to suction with no air leak.  Continue chest tube to suction.    9/5 OR for thoracoscopic talc and abrasive pleurodesis. Bleb resection not indicated.   9/6 No significant pneumothorax on AM CXR.  Chest tube with no air leak.  Continue CT to suction.  9/7 Chest x-ray with trace apical pneumothorax.  Continue chest tube to suction.  Daily chest xray's.    Trauma- (present on admission)  Assessment & Plan  Denies trauma      Discussed patient condition with Patient and trauma surgery, Dr. Demetrius Mccray.

## 2024-09-07 NOTE — CARE PLAN
The patient is Stable - Low risk of patient condition declining or worsening    Shift Goals  Clinical Goals: pulmonary hygiene, ambulation  Patient Goals: rest, comfort  Family Goals: Updates    Progress made toward(s) clinical / shift goals: Educated patient on the importance of pulmonary hygiene. Patient demonstrated correct use of the IS. Educated patient to use the IS ten times per hour. Patient demonstrated deep breathing and coughing. Patient ambulated in room. Educated patient on the importance of ambulation. Patient rested comfortably intermittently throughout shift. Patient and family updated on plan of care.       Problem: Respiratory  Goal: Patient will achieve/maintain optimum respiratory ventilation and gas exchange  Outcome: Progressing     Problem: Mobility  Goal: Patient's capacity to carry out activities will improve  Outcome: Progressing       Patient is not progressing towards the following goals:

## 2024-09-07 NOTE — CARE PLAN
The patient is Stable - Low risk of patient condition declining or worsening    Shift Goals  Clinical Goals: oob activity, pain control, patient will sit up in chair for all meals, patient will use IS 10x/hr  Patient Goals: rest, comfort  Family Goals: Updates    Progress made toward(s) clinical / shift goals:  Patient and family educated on importance of IS use. Patient able to sit up in chair for breakfast and lunch. Patient denies pain.     Problem: Knowledge Deficit - Standard  Goal: Patient and family/care givers will demonstrate understanding of plan of care, disease process/condition, diagnostic tests and medications  Description: Target End Date:  1-3 days or as soon as patient condition allows    Document in Patient Education    1.  Patient and family/caregiver oriented to unit, equipment, visitation policy and means for communicating concern  2.  Complete/review Learning Assessment  3.  Assess knowledge level of disease process/condition, treatment plan, diagnostic tests and medications  4.  Explain disease process/condition, treatment plan, diagnostic tests and medications  Outcome: Progressing     Problem: Pain - Standard  Goal: Alleviation of pain or a reduction in pain to the patient’s comfort goal  Description: Target End Date:  Prior to discharge or change in level of care    Document on Vitals flowsheet    1.  Document pain using the appropriate pain scale per order or unit policy  2.  Educate and implement non-pharmacologic comfort measures (i.e. relaxation, distraction, massage, cold/heat therapy, etc.)  3.  Pain management medications as ordered  4.  Reassess pain after pain med administration per policy  5.  If opiods administered assess patient's response to pain medication is appropriate per POSS sedation scale  6.  Follow pain management plan developed in collaboration with patient and interdisciplinary team (including palliative care or pain specialists if applicable)  Outcome: Progressing      Problem: Respiratory  Goal: Patient will achieve/maintain optimum respiratory ventilation and gas exchange  Description: Target End Date:  Prior to discharge or change in level of care    Document on Assessment flowsheet    1.  Assess and monitor rate, rhythm, depth and effort of respiration  2.  Breath sounds assessed qshift and/or as needed  3.  Assess O2 saturation, administer/titrate oxygen as ordered  4.  Position patient for maximum ventilatory efficiency  5.  Turn, cough, and deep breath with splinting to improve effectiveness  6.  Collaborate with RT to administer medication/treatments per order  7.  Encourage use of incentive spirometer and encourage patient to cough after use and utilize splinting techniques if applicable  8.  Airway suctioning  9.  Monitor sputum production for changes in color, consistency and frequency  10. Perform frequent oral hygiene  11. Alternate physical activity with rest periods  Outcome: Progressing     Problem: Mobility  Goal: Patient's capacity to carry out activities will improve  Description: Target End Date:  Prior to discharge or change in level of care    1.  Assess for barriers to mobility/activity  2.  Implement activity per interdisciplinary team recommendations  3.  Target activity level identified and patient/family/caregiver aware of goal  4.  Provide assistive devices  5.  Instruct patient/caregiver on proper use of assistive/adaptive devices  6.  Schedule activities and rest periods to decrease effects of fatigue  7.  Encourage mobilization to extent of ability  8.  Maintain proper body alignment  9.  Provide adequate pain management to allow progressive mobilization  10. Implement pace maker precautions as needed  Outcome: Progressing

## 2024-09-08 ENCOUNTER — APPOINTMENT (OUTPATIENT)
Dept: RADIOLOGY | Facility: MEDICAL CENTER | Age: 84
DRG: 166 | End: 2024-09-08
Attending: HOSPITALIST
Payer: MEDICARE

## 2024-09-08 LAB
ANION GAP SERPL CALC-SCNC: 9 MMOL/L (ref 7–16)
BUN SERPL-MCNC: 16 MG/DL (ref 8–22)
CALCIUM SERPL-MCNC: 8.5 MG/DL (ref 8.5–10.5)
CHLORIDE SERPL-SCNC: 99 MMOL/L (ref 96–112)
CO2 SERPL-SCNC: 27 MMOL/L (ref 20–33)
CREAT SERPL-MCNC: 0.39 MG/DL (ref 0.5–1.4)
GFR SERPLBLD CREATININE-BSD FMLA CKD-EPI: 109 ML/MIN/1.73 M 2
GLUCOSE SERPL-MCNC: 104 MG/DL (ref 65–99)
POTASSIUM SERPL-SCNC: 4 MMOL/L (ref 3.6–5.5)
SODIUM SERPL-SCNC: 135 MMOL/L (ref 135–145)

## 2024-09-08 PROCEDURE — 80048 BASIC METABOLIC PNL TOTAL CA: CPT

## 2024-09-08 PROCEDURE — 99024 POSTOP FOLLOW-UP VISIT: CPT | Performed by: NURSE PRACTITIONER

## 2024-09-08 PROCEDURE — 700102 HCHG RX REV CODE 250 W/ 637 OVERRIDE(OP): Performed by: HOSPITALIST

## 2024-09-08 PROCEDURE — A9270 NON-COVERED ITEM OR SERVICE: HCPCS | Performed by: HOSPITALIST

## 2024-09-08 PROCEDURE — 99232 SBSQ HOSP IP/OBS MODERATE 35: CPT | Performed by: HOSPITALIST

## 2024-09-08 PROCEDURE — 36415 COLL VENOUS BLD VENIPUNCTURE: CPT

## 2024-09-08 PROCEDURE — 770001 HCHG ROOM/CARE - MED/SURG/GYN PRIV*

## 2024-09-08 PROCEDURE — 71045 X-RAY EXAM CHEST 1 VIEW: CPT

## 2024-09-08 RX ADMIN — KETOROLAC TROMETHAMINE 1 DROP: 0.5 SOLUTION OPHTHALMIC at 06:45

## 2024-09-08 RX ADMIN — OXYCODONE HYDROCHLORIDE 5 MG: 5 TABLET ORAL at 13:04

## 2024-09-08 RX ADMIN — PREDNISOLONE ACETATE 1 DROP: 10 SUSPENSION/ DROPS OPHTHALMIC at 06:45

## 2024-09-08 RX ADMIN — POLYETHYLENE GLYCOL 3350 1 PACKET: 17 POWDER, FOR SOLUTION ORAL at 17:12

## 2024-09-08 RX ADMIN — RIVAROXABAN 10 MG: 10 TABLET, FILM COATED ORAL at 17:12

## 2024-09-08 RX ADMIN — OXYCODONE HYDROCHLORIDE 5 MG: 5 TABLET ORAL at 00:00

## 2024-09-08 RX ADMIN — SENNOSIDES AND DOCUSATE SODIUM 2 TABLET: 50; 8.6 TABLET ORAL at 17:12

## 2024-09-08 RX ADMIN — OXYCODONE HYDROCHLORIDE 5 MG: 5 TABLET ORAL at 20:49

## 2024-09-08 ASSESSMENT — PAIN DESCRIPTION - PAIN TYPE
TYPE: ACUTE PAIN
TYPE: ACUTE PAIN;CHRONIC PAIN
TYPE: ACUTE PAIN
TYPE: ACUTE PAIN;SURGICAL PAIN
TYPE: ACUTE PAIN

## 2024-09-08 ASSESSMENT — ENCOUNTER SYMPTOMS
WHEEZING: 0
PALPITATIONS: 0
BRUISES/BLEEDS EASILY: 0
SENSORY CHANGE: 0
FEVER: 0
SHORTNESS OF BREATH: 0
DEPRESSION: 0
SPEECH CHANGE: 0
BACK PAIN: 0
DIARRHEA: 0
EYE DISCHARGE: 0
SORE THROAT: 0
DIZZINESS: 0
EYE PAIN: 0
VOMITING: 0
ABDOMINAL PAIN: 0
LOSS OF CONSCIOUSNESS: 0
FOCAL WEAKNESS: 0
CLAUDICATION: 0
COUGH: 0
NECK PAIN: 0
MYALGIAS: 0
NAUSEA: 0
SPUTUM PRODUCTION: 0
DIAPHORESIS: 0
CONSTIPATION: 0
WEAKNESS: 0
CHILLS: 0
HEMOPTYSIS: 0
HEADACHES: 0

## 2024-09-08 ASSESSMENT — LIFESTYLE VARIABLES: SUBSTANCE_ABUSE: 0

## 2024-09-08 NOTE — CARE PLAN
The patient is Stable - Low risk of patient condition declining or worsening    Problem: Knowledge Deficit - Standard  Goal: Patient and family/care givers will demonstrate understanding of plan of care, disease process/condition, diagnostic tests and medications  Outcome: Progressing     Problem: Pain - Standard  Goal: Alleviation of pain or a reduction in pain to the patient’s comfort goal  Outcome: Progressing     Problem: Mobility  Goal: Patient's capacity to carry out activities will improve  Outcome: Progressing     Shift Goals  Clinical Goals: pain control, monitor CT, rest, comfort  Patient Goals: rest, comfort  Family Goals: Updates    Progress made toward(s) clinical / shift goals:  Patient's pain managed per MAR. Chest tube to -20 suction, patent, no air leaks noted, dressing in place, CDI. Patient able to ambulate in room with a FWW and standby to x1 assistance. Patient able to rest during this shift.     Patient is not progressing towards the following goals:

## 2024-09-08 NOTE — PROGRESS NOTES
Hospital Medicine Daily Progress Note    Date of Service  9/7/2024    Chief Complaint  Joaquín Hartman is a 84 y.o. male admitted 9/3/2024 with spontaneous right pneumothorax.    Hospital Course  Joaquín Hartman is a Persian speaking 84 y.o. male w/ a hx of COPD and prior smoker who presented 9/3/2024 with acute shortness of breath to a medical facility in [facility].  He was found to have a right sided large pneumothorax and medically air flown to [facility] after chest intracatheter placement.   Here he received an emergent chest tube with return of right sided lung appearance on follow up xray.  He denies recent trauma.   Likely spontaneous pneumothorax is from a pulmonary emphysematous bleb that ruptured.    Interval Problem Update  9/4: Patient states he is having pain.  I did use a .  He seems clinically dehydrated.  Therefore increased IV fluids from 75 an hour to 125 an hour.  He is currently not eating due to pain.  Increased his pain medications.  N.p.o. at midnight for thoracoscopy and pleurodesis in AM.  9/5:  successful thoracoscopy and pleurodesis performed.  In PACU doing well post op.  9/6:  used  on phone line.  States he is having minimal pain right chest.  No SOB, remains on 2 LPM NC.  Spoke with grand-daughter on phone to give update.  Likely once chest tube is clamped and then subsequently removed he can return home the next 2 or 3 days.  PT OT is pending.  Patient was baseline independent prior to admission.  Discontinued IV fluids due to minimal pleural effusions bilateral noted on today's chest x-ray.  9/7:   grandson at bedside, gave updates, translated for patient.  Patient states almost no pain, decreased pain medications.  On 3 LPM NC.  Small apical right pneumo on CXR, continue chest tube until lung perforation healed.    I have discussed this patient's plan of care and discharge plan at IDT rounds today with Case Management, Nursing, Nursing leadership, and  other members of the IDT team.    Consultants/Specialty  general surgery    Code Status  Full Code    Disposition  The patient is not medically cleared for discharge to home or a post-acute facility.  Anticipate discharge to: home with close outpatient follow-up    I have placed the appropriate orders for post-discharge needs.    Review of Systems  Review of Systems   Constitutional:  Negative for chills, diaphoresis, fever and malaise/fatigue.   HENT:  Negative for congestion and sore throat.    Eyes:  Negative for pain and discharge.   Respiratory:  Negative for cough, hemoptysis, sputum production, shortness of breath and wheezing.    Cardiovascular:  Positive for chest pain. Negative for palpitations, claudication and leg swelling.   Gastrointestinal:  Negative for abdominal pain, constipation, diarrhea, melena, nausea and vomiting.   Genitourinary:  Negative for dysuria, frequency and urgency.   Musculoskeletal:  Negative for back pain, joint pain, myalgias and neck pain.   Skin:  Negative for itching and rash.   Neurological:  Negative for dizziness, sensory change, speech change, focal weakness, loss of consciousness, weakness and headaches.   Endo/Heme/Allergies:  Does not bruise/bleed easily.   Psychiatric/Behavioral:  Negative for depression, substance abuse and suicidal ideas.         Physical Exam  Temp:  [36.6 °C (97.9 °F)-37.1 °C (98.8 °F)] 37.1 °C (98.8 °F)  Pulse:  [84-98] 90  Resp:  [16-18] 16  BP: (101-132)/(57-66) 104/61  SpO2:  [91 %-95 %] 95 %    Physical Exam  Constitutional:       General: He is not in acute distress.     Appearance: He is not diaphoretic.      Comments: Frail, rib protuberance.   HENT:      Head: Normocephalic and atraumatic.      Mouth/Throat:      Mouth: Mucous membranes are dry.      Pharynx: No oropharyngeal exudate.   Eyes:      General: No scleral icterus.        Right eye: No discharge.         Left eye: No discharge.      Conjunctiva/sclera: Conjunctivae normal.       Pupils: Pupils are equal, round, and reactive to light.   Neck:      Thyroid: No thyromegaly.      Vascular: No JVD.      Trachea: No tracheal deviation.   Cardiovascular:      Rate and Rhythm: Normal rate and regular rhythm.      Heart sounds: Normal heart sounds. No murmur heard.     No friction rub. No gallop.   Pulmonary:      Effort: Pulmonary effort is normal. No respiratory distress.      Breath sounds: Normal breath sounds. No wheezing or rales.      Comments: Right chest tube to suction.  Chest:      Chest wall: No tenderness.   Abdominal:      General: Bowel sounds are normal. There is no distension.      Palpations: Abdomen is soft. There is no mass.      Tenderness: There is no abdominal tenderness. There is no guarding or rebound.   Musculoskeletal:         General: No tenderness. Normal range of motion.      Cervical back: Normal range of motion and neck supple.   Lymphadenopathy:      Cervical: No cervical adenopathy.   Skin:     General: Skin is warm and dry.      Capillary Refill: Capillary refill takes less than 2 seconds.      Findings: No erythema or rash.   Neurological:      General: No focal deficit present.      Mental Status: He is alert and oriented to person, place, and time.      Cranial Nerves: No cranial nerve deficit.      Motor: No abnormal muscle tone.   Psychiatric:         Mood and Affect: Mood normal.         Behavior: Behavior normal.         Thought Content: Thought content normal.         Judgment: Judgment normal.         Fluids    Intake/Output Summary (Last 24 hours) at 9/7/2024 1718  Last data filed at 9/7/2024 1519  Gross per 24 hour   Intake 315 ml   Output 206 ml   Net 109 ml        Laboratory  Recent Labs     09/05/24  0301 09/06/24  0204 09/07/24  0332   WBC 7.4 9.1 9.6   RBC 4.13* 4.31* 4.00*   HEMOGLOBIN 13.2* 13.7* 13.2*   HEMATOCRIT 40.9* 42.3 39.5*   MCV 99.0* 98.1* 98.8*   MCH 32.0 31.8 33.0   MCHC 32.3 32.4 33.4   RDW 50.1* 47.5 49.2   PLATELETCT 226 227 218    MPV 10.4 10.3 10.3     Recent Labs     09/05/24  0301 09/06/24  0204 09/07/24  0332   SODIUM 138 135 136   POTASSIUM 4.3 4.4 4.1   CHLORIDE 101 99 99   CO2 27 28 26   GLUCOSE 97 115* 112*   BUN 15 11 18   CREATININE 0.45* 0.49* 0.64   CALCIUM 9.0 8.4* 9.2     Recent Labs     09/05/24  0301   APTT 33.9   INR 1.38*               Imaging  DX-CHEST-PORTABLE (1 VIEW)   Final Result         1.  Interstitial pulmonary parenchymal prominence suggest chronic underlying lung disease, component of interstitial edema and/or infiltrates not excluded.   2.  Trace right apical pneumothorax, thoracostomy tube in place.   3.  Trace bilateral pleural effusions, stable since prior study.   4.  Atherosclerosis      DX-CHEST-PORTABLE (1 VIEW)   Final Result         1.  Interstitial pulmonary parenchymal prominence suggest chronic underlying lung disease, component of interstitial edema and/or infiltrates not excluded.   2.  Trace bilateral pleural effusions, new since prior study.   3.  Atherosclerosis      DX-CHEST-PORTABLE (1 VIEW)   Final Result         1.  Interstitial pulmonary parenchymal prominence suggest chronic underlying lung disease, component of interstitial edema and/or infiltrates not excluded.      DX-CHEST-PORTABLE (1 VIEW)   Final Result      1.  Presents right chest tube      2.  Tiny right pneumothorax      CT-CHEST (THORAX) WITH   Final Result         1. Interval placement of right chest tube with small residual right pneumothorax.      2. Emphysematous changes with large bullae in the right middle lobe.   3. Patchy right basilar atelectasis.         DX-CHEST-PORTABLE (1 VIEW)   Final Result         No appreciable pneumothorax after right chest tube placement.      DX-CHEST-PORTABLE (1 VIEW)   Final Result      Stable large right pneumothorax.      DX-CHEST-PORTABLE (1 VIEW)   Final Result         Large right pneumothorax with collapsing of the right lung.            CRITICAL RESULT READ BACK: Preliminary findings  discussed with and critical read back performed by Dr. VALERIA BOLANOS in the Emergency Department via telephone on 9/3/2024 12:08 PM           Assessment/Plan  * Primary spontaneous pneumothorax- (present on admission)  Assessment & Plan  Spontaneous likely due to bleb from emphysema and prior years of smoking.  S/P chest tube after initial catheter at OSH failed.  Monitor pleurovac + air leak, chest tube placed to suction.  Supplemental oxygen to keep SpO2 >89%  RT per protocol   Surgery consulting   Plan for thoracoscopy and pleurodesis 9/5. NPO at [facility].  Increased pain control    Dehydration- (present on admission)  Assessment & Plan  Clinically dry mucus membranes, poor skin turgor.  Increased NS 75 to 125/hr.   Not currently eating, despite patient stating he ate lunch.  Nothing was eaten off his lunch tray.    Hyperglycemia  Assessment & Plan  Monitor labs    Elevated alkaline phosphatase level  Assessment & Plan  Alk Phos:178  Needs outpatient work up.  No current abdominal pain.    Hypokalemia  Assessment & Plan  9/3 K:3.5  Supplement and monitor  Check Mg    COPD (chronic obstructive pulmonary disease) (HCC)- (present on admission)  Assessment & Plan  No exacerbation  RT per protocol  Monitor SpO2 and titrate oxygen to keep >89%  Nebulizer and inhalers    Leukocytosis  Assessment & Plan  Likely leukemoid reaction  Monitor am CBC and vitals.  Hold antibiotics for now.         VTE prophylaxis:    Xarelto 10mg daily as prophylaxis      I have performed a physical exam and reviewed and updated ROS and Plan today (9/7/2024). In review of yesterday's note (9/6/2024), there are no changes except as documented above.

## 2024-09-08 NOTE — PROGRESS NOTES
Hospital Medicine Daily Progress Note    Date of Service  9/8/2024    Chief Complaint  Joaquín Hartman is a 84 y.o. male admitted 9/3/2024 with spontaneous right pneumothorax.    Hospital Course  Joaquín Hartman is a Azeri speaking 84 y.o. male w/ a hx of COPD and prior smoker who presented 9/3/2024 with acute shortness of breath to a medical facility in [facility].  He was found to have a right sided large pneumothorax and medically air flown to [facility] after chest intracatheter placement.   Here he received an emergent chest tube with return of right sided lung appearance on follow up xray.  He denies recent trauma.   Likely spontaneous pneumothorax is from a pulmonary emphysematous bleb that ruptured.    Interval Problem Update  9/4: Patient states he is having pain.  I did use a .  He seems clinically dehydrated.  Therefore increased IV fluids from 75 an hour to 125 an hour.  He is currently not eating due to pain.  Increased his pain medications.  N.p.o. at midnight for thoracoscopy and pleurodesis in AM.  9/5:  successful thoracoscopy and pleurodesis performed.  In PACU doing well post op.  9/6:  used  on phone line.  States he is having minimal pain right chest.  No SOB, remains on 2 LPM NC.  Spoke with grand-daughter on phone to give update.  Likely once chest tube is clamped and then subsequently removed he can return home the next 2 or 3 days.  PT OT is pending.  Patient was baseline independent prior to admission.  Discontinued IV fluids due to minimal pleural effusions bilateral noted on today's chest x-ray.  9/7:   grandson at bedside, gave updates, translated for patient.  Patient states almost no pain, decreased pain medications.  On 3 LPM NC.  Small apical right pneumo on CXR, continue chest tube until lung perforation healed.  9/8:  remains on 5 LPM NC.  Tolerating diet well.  No chest pain.  Sleepy on exam.  Sitting up in chair.  I have ordered home health PT OT  and RN.  Chest x-ray remains with right small apical pneumothorax.  Continue with right chest tube.    I have discussed this patient's plan of care and discharge plan at IDT rounds today with Case Management, Nursing, Nursing leadership, and other members of the IDT team.    Consultants/Specialty  general surgery    Code Status  Full Code    Disposition  The patient is not medically cleared for discharge to home or a post-acute facility.  Anticipate discharge to: home with organized home healthcare and close outpatient follow-up    I have placed the appropriate orders for post-discharge needs.    Review of Systems  Review of Systems   Constitutional:  Negative for chills, diaphoresis, fever and malaise/fatigue.   HENT:  Negative for congestion and sore throat.    Eyes:  Negative for pain and discharge.   Respiratory:  Negative for cough, hemoptysis, sputum production, shortness of breath and wheezing.    Cardiovascular:  Positive for chest pain. Negative for palpitations, claudication and leg swelling.   Gastrointestinal:  Negative for abdominal pain, constipation, diarrhea, melena, nausea and vomiting.   Genitourinary:  Negative for dysuria, frequency and urgency.   Musculoskeletal:  Negative for back pain, joint pain, myalgias and neck pain.   Skin:  Negative for itching and rash.   Neurological:  Negative for dizziness, sensory change, speech change, focal weakness, loss of consciousness, weakness and headaches.   Endo/Heme/Allergies:  Does not bruise/bleed easily.   Psychiatric/Behavioral:  Negative for depression, substance abuse and suicidal ideas.         Physical Exam  Temp:  [36.7 °C (98 °F)-37.2 °C (99 °F)] 36.7 °C (98.1 °F)  Pulse:  [] 90  Resp:  [17-20] 19  BP: (106-162)/(57-82) 117/58  SpO2:  [89 %-94 %] 91 %    Physical Exam  Constitutional:       General: He is not in acute distress.     Appearance: He is not diaphoretic.      Comments: Frail, rib protuberance.   HENT:      Head: Normocephalic  and atraumatic.      Mouth/Throat:      Mouth: Mucous membranes are dry.      Pharynx: No oropharyngeal exudate.   Eyes:      General: No scleral icterus.        Right eye: No discharge.         Left eye: No discharge.      Conjunctiva/sclera: Conjunctivae normal.      Pupils: Pupils are equal, round, and reactive to light.   Neck:      Thyroid: No thyromegaly.      Vascular: No JVD.      Trachea: No tracheal deviation.   Cardiovascular:      Rate and Rhythm: Normal rate and regular rhythm.      Heart sounds: Normal heart sounds. No murmur heard.     No friction rub. No gallop.   Pulmonary:      Effort: Pulmonary effort is normal. No respiratory distress.      Breath sounds: Normal breath sounds. No wheezing or rales.      Comments: Right chest tube to suction.  Chest:      Chest wall: No tenderness.   Abdominal:      General: Bowel sounds are normal. There is no distension.      Palpations: Abdomen is soft. There is no mass.      Tenderness: There is no abdominal tenderness. There is no guarding or rebound.   Musculoskeletal:         General: No tenderness. Normal range of motion.      Cervical back: Normal range of motion and neck supple.   Lymphadenopathy:      Cervical: No cervical adenopathy.   Skin:     General: Skin is warm and dry.      Capillary Refill: Capillary refill takes less than 2 seconds.      Findings: No erythema or rash.   Neurological:      General: No focal deficit present.      Mental Status: He is alert and oriented to person, place, and time.      Cranial Nerves: No cranial nerve deficit.      Motor: No abnormal muscle tone.   Psychiatric:         Mood and Affect: Mood normal.         Behavior: Behavior normal.         Thought Content: Thought content normal.         Judgment: Judgment normal.         Fluids    Intake/Output Summary (Last 24 hours) at 9/8/2024 1635  Last data filed at 9/8/2024 1615  Gross per 24 hour   Intake 240 ml   Output 606 ml   Net -366 ml        Laboratory  Recent  Labs     09/06/24  0204 09/07/24  0332   WBC 9.1 9.6   RBC 4.31* 4.00*   HEMOGLOBIN 13.7* 13.2*   HEMATOCRIT 42.3 39.5*   MCV 98.1* 98.8*   MCH 31.8 33.0   MCHC 32.4 33.4   RDW 47.5 49.2   PLATELETCT 227 218   MPV 10.3 10.3     Recent Labs     09/06/24  0204 09/07/24  0332 09/08/24  0348   SODIUM 135 136 135   POTASSIUM 4.4 4.1 4.0   CHLORIDE 99 99 99   CO2 28 26 27   GLUCOSE 115* 112* 104*   BUN 11 18 16   CREATININE 0.49* 0.64 0.39*   CALCIUM 8.4* 9.2 8.5                     Imaging  DX-CHEST-PORTABLE (1 VIEW)   Final Result         1. Tiny right apical pneumothorax.   2. Small amount of right pleural fluid and right basilar airspace disease and noted.   3. Severe emphysema.      DX-CHEST-PORTABLE (1 VIEW)   Final Result         1.  Interstitial pulmonary parenchymal prominence suggest chronic underlying lung disease, component of interstitial edema and/or infiltrates not excluded.   2.  Trace right apical pneumothorax, thoracostomy tube in place.   3.  Trace bilateral pleural effusions, stable since prior study.   4.  Atherosclerosis      DX-CHEST-PORTABLE (1 VIEW)   Final Result         1.  Interstitial pulmonary parenchymal prominence suggest chronic underlying lung disease, component of interstitial edema and/or infiltrates not excluded.   2.  Trace bilateral pleural effusions, new since prior study.   3.  Atherosclerosis      DX-CHEST-PORTABLE (1 VIEW)   Final Result         1.  Interstitial pulmonary parenchymal prominence suggest chronic underlying lung disease, component of interstitial edema and/or infiltrates not excluded.      DX-CHEST-PORTABLE (1 VIEW)   Final Result      1.  Presents right chest tube      2.  Tiny right pneumothorax      CT-CHEST (THORAX) WITH   Final Result         1. Interval placement of right chest tube with small residual right pneumothorax.      2. Emphysematous changes with large bullae in the right middle lobe.   3. Patchy right basilar atelectasis.         DX-CHEST-PORTABLE  (1 VIEW)   Final Result         No appreciable pneumothorax after right chest tube placement.      DX-CHEST-PORTABLE (1 VIEW)   Final Result      Stable large right pneumothorax.      DX-CHEST-PORTABLE (1 VIEW)   Final Result         Large right pneumothorax with collapsing of the right lung.            CRITICAL RESULT READ BACK: Preliminary findings discussed with and critical read back performed by Dr. VALERIA BOLANOS in the Emergency Department via telephone on 9/3/2024 12:08 PM           Assessment/Plan  * Primary spontaneous pneumothorax- (present on admission)  Assessment & Plan  Spontaneous likely due to bleb from emphysema and prior years of smoking.  S/P chest tube after initial catheter at OSH failed.  Monitor pleurovac + air leak, chest tube placed to suction.  Supplemental oxygen to keep SpO2 >89%  RT per protocol   Surgery consulting   Plan for thoracoscopy and pleurodesis 9/5. NPO at [facility].  Increased pain control    Dehydration- (present on admission)  Assessment & Plan  Clinically dry mucus membranes, poor skin turgor.  Increased NS 75 to 125/hr.   Not currently eating, despite patient stating he ate lunch.  Nothing was eaten off his lunch tray.    Hyperglycemia  Assessment & Plan  Monitor labs    Elevated alkaline phosphatase level  Assessment & Plan  Alk Phos:178  Needs outpatient work up.  No current abdominal pain.    Hypokalemia  Assessment & Plan  9/3 K:3.5  Supplement and monitor  Check Mg    COPD (chronic obstructive pulmonary disease) (HCC)- (present on admission)  Assessment & Plan  No exacerbation  RT per protocol  Monitor SpO2 and titrate oxygen to keep >89%  Nebulizer and inhalers    Leukocytosis  Assessment & Plan  Likely leukemoid reaction  Monitor am CBC and vitals.  Hold antibiotics for now.         VTE prophylaxis:    Xarelto 10mg daily as prophylaxis      I have performed a physical exam and reviewed and updated ROS and Plan today (9/8/2024). In review of yesterday's note (9/7/2024),  there are no changes except as documented above.

## 2024-09-08 NOTE — PROGRESS NOTES
Report received from RN, assumed care at 0645  Pt is A0X4, and responds appropriately   Pt declines any SOB, chest pain, new onset of numbness/ tingling  Pt rates pain at 7/10, on a scale of 1-10, pt medicated per MAR  Pt is voiding adequately and without hesitancy  Pt has + flatus, + bowel sounds. LBM 9/3  Pt ambulates with a x1 assist with fww.   Pt is tolerating a diet, pt denies any nausea/vomiting  Right chest tube to -20 suction. Requiring 4-6L of supplemental O2  Plan of care discussed, all questions answered. Explained importance of calling before getting OOB and pt verbalizes understanding. Explained importance of oral care. Call light is within reach, treaded slipper socks on, bed in lowest/ locked position, hourly rounding in place, all needs met at this time. Bed alarm on and set to 0 seconds per HD score.

## 2024-09-08 NOTE — PROGRESS NOTES
Bedside report received.  Assessment complete.    A&O x 4. Patient calls appropriately. Polish speaker,  at bedside.  Patient ambulates with x1 - x2 assist with FWW. Bed alarm on.   Patient has 2/10 pain. No pharmacological interventions provided at this time. Patient medicated per MAR.  Denies N&V. Tolerating regular diet.  Right chest tube to -20 suction, patent, no air leak noted, dressing in place, CDI.  + void, + flatus, - BM, last BM PTA.  Patient denies SOB.  SCD's on.    Review plan with of care with patient. Call light and personal belongings within reach. Hourly rounding in place. All needs met at this time.

## 2024-09-08 NOTE — PROGRESS NOTES
"  DATE: 9/8/2024    84 year old  male who was transferred here for spontaneous pneumothorax. Non-trauma.     9/3 Right tube thoracostomy tube placement  9/5 Thoracoscopic talc and abrasive pleurodesis    INTERVAL EVENTS:    Chest x-ray with persistent tiny right apical pneumothorax.  Chest tube with no air leak.  Oxygen demands increased from 3 to 5 L.  Poor incentive spirometry.  Xarelto.    -Continue chest tube to suction  -Continue aggressive pulmonary hygiene multimodal pain manage maintenance serial chest radiography  -Low threshold for higher level of care given patient's age, surgical procedures and significant past medical history    PHYSICAL EXAMINATION:  Vital Signs: /58   Pulse 100   Temp 37.1 °C (98.8 °F) (Temporal)   Resp 17   Ht 1.68 m (5' 6.14\")   Wt 48.6 kg (107 lb 2.3 oz)   SpO2 89%     Constitutional: Elderly, deconditioned, chronically ill in appearance.  Neurologic: Conversant  Cardiovascular: Tachycardia  Pulmonary: Chest tube in place with no airleak.  Scant output.  Nasal cannula.  No tachypnea.  No accessory muscle use.    LABORATORY VALUES:  Recent Labs     09/06/24  0204 09/07/24  0332   WBC 9.1 9.6   RBC 4.31* 4.00*   HEMOGLOBIN 13.7* 13.2*   HEMATOCRIT 42.3 39.5*   MCV 98.1* 98.8*   MCH 31.8 33.0   MCHC 32.4 33.4   RDW 47.5 49.2   PLATELETCT 227 218   MPV 10.3 10.3     Recent Labs     09/06/24  0204 09/07/24  0332 09/08/24  0348   SODIUM 135 136 135   POTASSIUM 4.4 4.1 4.0   CHLORIDE 99 99 99   CO2 28 26 27   GLUCOSE 115* 112* 104*   BUN 11 18 16   CREATININE 0.49* 0.64 0.39*   CALCIUM 8.4* 9.2 8.5                IMAGING:  DX-CHEST-PORTABLE (1 VIEW)   Final Result         1. Tiny right apical pneumothorax.   2. Small amount of right pleural fluid and right basilar airspace disease and noted.   3. Severe emphysema.      DX-CHEST-PORTABLE (1 VIEW)   Final Result         1.  Interstitial pulmonary parenchymal prominence suggest chronic underlying lung disease, component of " interstitial edema and/or infiltrates not excluded.   2.  Trace right apical pneumothorax, thoracostomy tube in place.   3.  Trace bilateral pleural effusions, stable since prior study.   4.  Atherosclerosis      DX-CHEST-PORTABLE (1 VIEW)   Final Result         1.  Interstitial pulmonary parenchymal prominence suggest chronic underlying lung disease, component of interstitial edema and/or infiltrates not excluded.   2.  Trace bilateral pleural effusions, new since prior study.   3.  Atherosclerosis      DX-CHEST-PORTABLE (1 VIEW)   Final Result         1.  Interstitial pulmonary parenchymal prominence suggest chronic underlying lung disease, component of interstitial edema and/or infiltrates not excluded.      DX-CHEST-PORTABLE (1 VIEW)   Final Result      1.  Presents right chest tube      2.  Tiny right pneumothorax      CT-CHEST (THORAX) WITH   Final Result         1. Interval placement of right chest tube with small residual right pneumothorax.      2. Emphysematous changes with large bullae in the right middle lobe.   3. Patchy right basilar atelectasis.         DX-CHEST-PORTABLE (1 VIEW)   Final Result         No appreciable pneumothorax after right chest tube placement.      DX-CHEST-PORTABLE (1 VIEW)   Final Result      Stable large right pneumothorax.      DX-CHEST-PORTABLE (1 VIEW)   Final Result         Large right pneumothorax with collapsing of the right lung.            CRITICAL RESULT READ BACK: Preliminary findings discussed with and critical read back performed by Dr. VALERIA BOLANOS in the Emergency Department via telephone on 9/3/2024 12:08 PM          ASSESSMENT AND PLAN:  * Primary spontaneous pneumothorax- (present on admission)  Assessment & Plan  Large spontaneous pneumothorax   Attempted decompression in ED.  Chest tube placed in ED. (+) air leak noted/no drainage. Placed to suction.  9/4 CXR with no pneumothorax.  CT to suction with no air leak.  Continue chest tube to suction.    9/5 OR for  thoracoscopic talc and abrasive pleurodesis. Bleb resection not indicated.   9/6 No significant pneumothorax on AM CXR.  Chest tube with no air leak.  Continue CT to suction.  9/8 Chest x-ray with trace apical pneumothorax.  Continue chest tube to suction.  Daily chest xray's.    Trauma- (present on admission)  Assessment & Plan  Denies trauma         ____________________________________     JEANE Perez.    DD: 9/8/2024  9:14 AM

## 2024-09-08 NOTE — CARE PLAN
The patient is Stable - Low risk of patient condition declining or worsening    Shift Goals  Clinical Goals: pulmonary hygeine. Walk three times  Patient Goals: rest, comfort  Family Goals: Updates    Progress made toward(s) clinical / shift goals:  Patient has been up 2x so far, agrees to ambulate again during the shift. Encouraging IS use. Patient has weak cough, encouraging to splint to help increase cough strength     Patient is not progressing towards the following goals:

## 2024-09-09 ENCOUNTER — APPOINTMENT (OUTPATIENT)
Dept: RADIOLOGY | Facility: MEDICAL CENTER | Age: 84
DRG: 166 | End: 2024-09-09
Attending: HOSPITALIST
Payer: MEDICARE

## 2024-09-09 LAB
ANION GAP SERPL CALC-SCNC: 7 MMOL/L (ref 7–16)
BUN SERPL-MCNC: 12 MG/DL (ref 8–22)
CALCIUM SERPL-MCNC: 8.2 MG/DL (ref 8.5–10.5)
CHLORIDE SERPL-SCNC: 97 MMOL/L (ref 96–112)
CO2 SERPL-SCNC: 28 MMOL/L (ref 20–33)
CREAT SERPL-MCNC: 0.27 MG/DL (ref 0.5–1.4)
GFR SERPLBLD CREATININE-BSD FMLA CKD-EPI: 121 ML/MIN/1.73 M 2
GLUCOSE SERPL-MCNC: 97 MG/DL (ref 65–99)
POTASSIUM SERPL-SCNC: 4.1 MMOL/L (ref 3.6–5.5)
SODIUM SERPL-SCNC: 132 MMOL/L (ref 135–145)

## 2024-09-09 PROCEDURE — 700102 HCHG RX REV CODE 250 W/ 637 OVERRIDE(OP): Performed by: HOSPITALIST

## 2024-09-09 PROCEDURE — A9270 NON-COVERED ITEM OR SERVICE: HCPCS | Performed by: HOSPITALIST

## 2024-09-09 PROCEDURE — 99024 POSTOP FOLLOW-UP VISIT: CPT | Performed by: NURSE PRACTITIONER

## 2024-09-09 PROCEDURE — 99232 SBSQ HOSP IP/OBS MODERATE 35: CPT | Performed by: HOSPITALIST

## 2024-09-09 PROCEDURE — 71045 X-RAY EXAM CHEST 1 VIEW: CPT

## 2024-09-09 PROCEDURE — 700101 HCHG RX REV CODE 250: Performed by: HOSPITALIST

## 2024-09-09 PROCEDURE — 770001 HCHG ROOM/CARE - MED/SURG/GYN PRIV*

## 2024-09-09 PROCEDURE — 80048 BASIC METABOLIC PNL TOTAL CA: CPT

## 2024-09-09 PROCEDURE — 94640 AIRWAY INHALATION TREATMENT: CPT

## 2024-09-09 PROCEDURE — 36415 COLL VENOUS BLD VENIPUNCTURE: CPT

## 2024-09-09 RX ORDER — IPRATROPIUM BROMIDE AND ALBUTEROL SULFATE 2.5; .5 MG/3ML; MG/3ML
3 SOLUTION RESPIRATORY (INHALATION)
Status: DISCONTINUED | OUTPATIENT
Start: 2024-09-09 | End: 2024-09-15 | Stop reason: HOSPADM

## 2024-09-09 RX ORDER — ACETAMINOPHEN 325 MG/1
650 TABLET ORAL EVERY 6 HOURS PRN
Status: DISCONTINUED | OUTPATIENT
Start: 2024-09-09 | End: 2024-09-15 | Stop reason: HOSPADM

## 2024-09-09 RX ORDER — BUDESONIDE 0.5 MG/2ML
0.5 INHALANT ORAL
Status: DISCONTINUED | OUTPATIENT
Start: 2024-09-09 | End: 2024-09-13

## 2024-09-09 RX ORDER — HYDROCODONE BITARTRATE AND ACETAMINOPHEN 5; 325 MG/1; MG/1
1 TABLET ORAL EVERY 6 HOURS PRN
Status: DISCONTINUED | OUTPATIENT
Start: 2024-09-09 | End: 2024-09-15 | Stop reason: HOSPADM

## 2024-09-09 RX ADMIN — HYDROCODONE BITARTRATE AND ACETAMINOPHEN 1 TABLET: 5; 325 TABLET ORAL at 13:50

## 2024-09-09 RX ADMIN — MAGNESIUM HYDROXIDE 30 ML: 1200 LIQUID ORAL at 17:39

## 2024-09-09 RX ADMIN — IPRATROPIUM BROMIDE AND ALBUTEROL SULFATE 3 ML: 2.5; .5 SOLUTION RESPIRATORY (INHALATION) at 11:27

## 2024-09-09 RX ADMIN — SENNOSIDES AND DOCUSATE SODIUM 2 TABLET: 50; 8.6 TABLET ORAL at 17:38

## 2024-09-09 RX ADMIN — PREDNISOLONE ACETATE 1 DROP: 10 SUSPENSION/ DROPS OPHTHALMIC at 06:11

## 2024-09-09 RX ADMIN — KETOROLAC TROMETHAMINE 1 DROP: 0.5 SOLUTION OPHTHALMIC at 06:11

## 2024-09-09 RX ADMIN — RIVAROXABAN 10 MG: 10 TABLET, FILM COATED ORAL at 17:38

## 2024-09-09 ASSESSMENT — ENCOUNTER SYMPTOMS
EYE DISCHARGE: 0
PALPITATIONS: 0
BACK PAIN: 0
DEPRESSION: 0
DIARRHEA: 0
HEMOPTYSIS: 0
EYE PAIN: 0
FEVER: 0
LOSS OF CONSCIOUSNESS: 0
SHORTNESS OF BREATH: 0
VOMITING: 0
NECK PAIN: 0
SENSORY CHANGE: 0
CHILLS: 0
NAUSEA: 0
BRUISES/BLEEDS EASILY: 0
FOCAL WEAKNESS: 0
DIZZINESS: 0
ABDOMINAL PAIN: 0
SORE THROAT: 0
DIAPHORESIS: 0
HEADACHES: 0
WEAKNESS: 0
CONSTIPATION: 0
SPEECH CHANGE: 0
SPUTUM PRODUCTION: 0
COUGH: 0
MYALGIAS: 0
CLAUDICATION: 0
WHEEZING: 0

## 2024-09-09 ASSESSMENT — LIFESTYLE VARIABLES: SUBSTANCE_ABUSE: 0

## 2024-09-09 ASSESSMENT — PAIN DESCRIPTION - PAIN TYPE
TYPE: ACUTE PAIN

## 2024-09-09 NOTE — PROGRESS NOTES
"  DATE: 9/9/2024    84 y.o. male admitted 9/3/2024 with spontaneous pneumothorax. Non-trauma.     9/3 Right tube thoracostomy  9/5 Thoracoscopic talc and abrasive pleurodesis     INTERVAL EVENTS:    Chest tube to suction with no airleak and scant output.  No pneumothorax on a.m. chest x-ray.  Small amount of right pleural fluid.  I-S approximately 500 cc.  5 L nasal cannula.  Xarelto.    REVIEW OF SYSTEMS:  Comprehensive review of systems is negative with the exception of the aforementioned HPI, PMH, and PSH bullets in accordance with CMS guidelines.    PHYSICAL EXAMINATION:  Vital Signs: BP (!) 145/68   Pulse 86   Temp 37 °C (98.6 °F) (Temporal)   Resp 16   Ht 1.68 m (5' 6.14\")   Wt 48.6 kg (107 lb 2.3 oz)   SpO2 92%   Physical Exam  Vitals and nursing note reviewed.   Constitutional:       General: He is awake. He is not in acute distress.     Appearance: He is not ill-appearing, toxic-appearing or diaphoretic.      Comments: Elderly, frail and deconditioned   HENT:      Head: Normocephalic and atraumatic.      Nose: Nose normal.      Mouth/Throat:      Mouth: Mucous membranes are moist.   Eyes:      Conjunctiva/sclera: Conjunctivae normal.   Cardiovascular:      Rate and Rhythm: Normal rate.   Pulmonary:      Effort: No respiratory distress.      Comments: Chest tube to suction with no air leak.    Chest:      Chest wall: Tenderness present.   Abdominal:      General: There is no distension.      Tenderness: There is no abdominal tenderness. There is no guarding or rebound.   Musculoskeletal:      Cervical back: Neck supple.      Comments: Moves all extremities   Skin:     General: Skin is warm and dry.      Capillary Refill: Capillary refill takes less than 2 seconds.   Neurological:      Mental Status: He is alert.      Comments: Conversant   Psychiatric:         Mood and Affect: Mood normal.         Behavior: Behavior is cooperative.         LABORATORY VALUES:  Recent Labs     09/07/24  0332   WBC 9.6 "   RBC 4.00*   HEMOGLOBIN 13.2*   HEMATOCRIT 39.5*   MCV 98.8*   MCH 33.0   MCHC 33.4   RDW 49.2   PLATELETCT 218   MPV 10.3     Recent Labs     09/07/24  0332 09/08/24  0348 09/09/24  0644   SODIUM 136 135 132*   POTASSIUM 4.1 4.0 4.1   CHLORIDE 99 99 97   CO2 26 27 28   GLUCOSE 112* 104* 97   BUN 18 16 12   CREATININE 0.64 0.39* 0.27*   CALCIUM 9.2 8.5 8.2*               IMAGING:  DX-CHEST-PORTABLE (1 VIEW)   Final Result         1. No pneumothorax.   2. Small amount of right pleural fluid and right basilar airspace disease again noted.   3. Severe emphysema.      DX-CHEST-PORTABLE (1 VIEW)   Final Result         1. Tiny right apical pneumothorax.   2. Small amount of right pleural fluid and right basilar airspace disease and noted.   3. Severe emphysema.      DX-CHEST-PORTABLE (1 VIEW)   Final Result         1.  Interstitial pulmonary parenchymal prominence suggest chronic underlying lung disease, component of interstitial edema and/or infiltrates not excluded.   2.  Trace right apical pneumothorax, thoracostomy tube in place.   3.  Trace bilateral pleural effusions, stable since prior study.   4.  Atherosclerosis      DX-CHEST-PORTABLE (1 VIEW)   Final Result         1.  Interstitial pulmonary parenchymal prominence suggest chronic underlying lung disease, component of interstitial edema and/or infiltrates not excluded.   2.  Trace bilateral pleural effusions, new since prior study.   3.  Atherosclerosis      DX-CHEST-PORTABLE (1 VIEW)   Final Result         1.  Interstitial pulmonary parenchymal prominence suggest chronic underlying lung disease, component of interstitial edema and/or infiltrates not excluded.      DX-CHEST-PORTABLE (1 VIEW)   Final Result      1.  Presents right chest tube      2.  Tiny right pneumothorax      CT-CHEST (THORAX) WITH   Final Result         1. Interval placement of right chest tube with small residual right pneumothorax.      2. Emphysematous changes with large bullae in the  right middle lobe.   3. Patchy right basilar atelectasis.         DX-CHEST-PORTABLE (1 VIEW)   Final Result         No appreciable pneumothorax after right chest tube placement.      DX-CHEST-PORTABLE (1 VIEW)   Final Result      Stable large right pneumothorax.      DX-CHEST-PORTABLE (1 VIEW)   Final Result         Large right pneumothorax with collapsing of the right lung.            CRITICAL RESULT READ BACK: Preliminary findings discussed with and critical read back performed by Dr. VALERIA BOLANOS in the Emergency Department via telephone on 9/3/2024 12:08 PM          ASSESSMENT AND PLAN:  * Primary spontaneous pneumothorax- (present on admission)  Assessment & Plan  Large spontaneous pneumothorax   Attempted decompression in ED.  Chest tube placed in ED. (+) air leak noted/no drainage. Placed to suction.  9/4 CXR with no pneumothorax.  CT to suction with no air leak.  Continue chest tube to suction.    9/5 OR for thoracoscopic talc and abrasive pleurodesis. Bleb resection not indicated.   9/9 Chest tube to water seal.  Daily chest xray's.    Trauma- (present on admission)  Assessment & Plan  Denies trauma         ____________________________________     JEANE Perez.    DD: 9/9/2024  10:41 AM

## 2024-09-09 NOTE — CARE PLAN
The patient is Watcher - Medium risk of patient condition declining or worsening    Shift Goals  Clinical Goals: pulm hygiene, safety, oxygenation, ambulation  Patient Goals: rest, comfort  Family Goals: Updates    Progress made toward(s) clinical / shift goals:  ambulated in room, ct without air leak. 4L NC. Pain managed per mar.    Problem: Pain - Standard  Goal: Alleviation of pain or a reduction in pain to the patient’s comfort goal  Description: Target End Date:  Prior to discharge or change in level of care    Document on Vitals flowsheet    1.  Document pain using the appropriate pain scale per order or unit policy  2.  Educate and implement non-pharmacologic comfort measures (i.e. relaxation, distraction, massage, cold/heat therapy, etc.)  3.  Pain management medications as ordered  4.  Reassess pain after pain med administration per policy  5.  If opiods administered assess patient's response to pain medication is appropriate per POSS sedation scale  6.  Follow pain management plan developed in collaboration with patient and interdisciplinary team (including palliative care or pain specialists if applicable)  Outcome: Progressing

## 2024-09-09 NOTE — PROGRESS NOTES
Report received from RN, assumed care at 0645  Pt is A0X4, and responds appropriately   Pt declines any SOB, chest pain, new onset of numbness/ tingling  Pt rates pain at 7/10, on a scale of 1-10, pt medicated per MAR  Pt is voiding adequately and without hesitancy  Pt has + flatus, + bowel sounds. LBM 9/3. Requested additional bowel medications, given prune juice and butter.   Pt ambulates with a x1 assist with fww.   Pt is tolerating a diet, pt denies any nausea/vomiting. Low PO intake, normal per family.   Right chest tube to water seal.   Plan of care discussed, all questions answered. Explained importance of calling before getting OOB and pt verbalizes understanding. Explained importance of oral care. Call light is within reach, treaded slipper socks on, bed in lowest/ locked position, hourly rounding in place, all needs met at this time. Bed alarm on and set to 0 seconds per HD score

## 2024-09-09 NOTE — PROGRESS NOTES
Report received from donald RN  Pt AAOx4, Alert. Responds appropriately. Hungarian speaking  4L NC, increased WOB with exertion.   IS at bedside  Right CT to -20 suction, patent. No air leak noted.  Pain managed per MAR, resting comfortably.  +void, +flatus, No BM. Abdomen rounded, semi-firm. +bowel sounds  Regular diet, tolerating well. No n/v  Ambulating to BR with steady gait. Standby with FWW  Calls appropriately for assistance  SCDs on      POC reviewed, education provided. Bed locked and in low position, pt instructed to call for assistance. Comprehension verbalized. Call light within reach, all needs met at this time.

## 2024-09-09 NOTE — DISCHARGE PLANNING
Care Transition Team Assessment    RNCM completed assessment and verified information on face sheet. Patient lives in [place] with extensive family help. Patient has chest tube in place and will likely need HH and O2 upon DC. Not medically cleared at this time. HCM will continue to follow to obtain choice when medically cleared.     Information Source  Orientation Level: Oriented X4  Who is responsible for making decisions for patient? : Patient    Readmission Evaluation  Is this a readmission?: No    Elopement Risk  Legal Hold: No  Ambulatory or Self Mobile in Wheelchair: Yes  Disoriented: No  Psychiatric Symptoms: None  History of Wandering: No  Elopement this Admit: No  Vocalizing Wanting to Leave: No  Displays Behaviors, Body Language Wanting to Leave: No-Not at Risk for Elopement  Elopement Risk: Not at Risk for Elopement    Interdisciplinary Discharge Planning  Lives with - Patient's Self Care Capacity: Related Adult  Patient or legal guardian wants to designate a caregiver: No  Support Systems: Friends / Neighbors, Family Member(s)  Housing / Facility: [address]    Discharge Preparedness  What is your plan after discharge?: Home with help, Home health care  What are your discharge supports?: Child  Prior Functional Level: Ambulatory, Independent with Activities of Daily Living, Needs Assist with Medication Management  Difficulity with ADLs: None  Difficulity with IADLs: Cooking, Driving, Laundry, Managing medication, Shopping    Functional Assesment  Prior Functional Level: Ambulatory, Independent with Activities of Daily Living, Needs Assist with Medication Management    Finances  Financial Barriers to Discharge: No  Prescription Coverage: Yes    Vision / Hearing Impairment  Vision Impairment : No  Right Eye Vision: Impaired  Left Eye Vision: Impaired  Hearing Impairment : No  Hearing Impairment: Both Ears         Advance Directive  Advance Directive?: None    Domestic Abuse  Possible Abuse/Neglect  Reported to:: Not Applicable    Psychological Assessment  History of Substance Abuse: None  History of Psychiatric Problems: No  Non-compliant with Treatment: No  Newly Diagnosed Illness: Yes    Discharge Risks or Barriers  Discharge risks or barriers?: No  Patient risk factors: Vulnerable adult    Anticipated Discharge Information  Discharge Disposition: D/T to home under A care in anticipation of covered skilled care (06)

## 2024-09-09 NOTE — PROGRESS NOTES
Hospital Medicine Daily Progress Note    Date of Service  9/9/2024    Chief Complaint  Joaquín Hartman is a 84 y.o. male admitted 9/3/2024 with spontaneous right pneumothorax.    Hospital Course  Joaquín Hartman is a Frisian speaking 84 y.o. male w/ a hx of COPD and prior smoker who presented 9/3/2024 with acute shortness of breath to a medical facility in [facility].  He was found to have a right sided large pneumothorax and medically air flown to [facility] after chest intracatheter placement.   Here he received an emergent chest tube with return of right sided lung appearance on follow up xray.  He denies recent trauma.   Likely spontaneous pneumothorax is from a pulmonary emphysematous bleb that ruptured.    Interval Problem Update  9/4: Patient states he is having pain.  I did use a .  He seems clinically dehydrated.  Therefore increased IV fluids from 75 an hour to 125 an hour.  He is currently not eating due to pain.  Increased his pain medications.  N.p.o. at midnight for thoracoscopy and pleurodesis in AM.  9/5:  successful thoracoscopy and pleurodesis performed.  In PACU doing well post op.  9/6:  used  on phone line.  States he is having minimal pain right chest.  No SOB, remains on 2 LPM NC.  Spoke with grand-daughter on phone to give update.  Likely once chest tube is clamped and then subsequently removed he can return home the next 2 or 3 days.  PT OT is pending.  Patient was baseline independent prior to admission.  Discontinued IV fluids due to minimal pleural effusions bilateral noted on today's chest x-ray.  9/7:   grandson at bedside, gave updates, translated for patient.  Patient states almost no pain, decreased pain medications.  On 3 LPM NC.  Small apical right pneumo on CXR, continue chest tube until lung perforation healed.  9/8:  remains on 5 LPM NC.  Tolerating diet well.  No chest pain.  Sleepy on exam.  Sitting up in chair.  I have ordered home health PT OT  and RN.  Chest x-ray remains with right small apical pneumothorax.  Continue with right chest tube.  9/9:  CXR no further pneumothorax seen, chest tube to water seal, increase oxygen demand to 6 LPM NC.  I have added Spiriva and Pulmicort inhalers.  Encourage incentive spirometer.  Palliative care consult given mildly worsening oxygenation and difficulty weaning from chest tube.  Laxatives ordered.  I did taper oxycodone to Norco as needed to help him stay more awake and participate with incentive spirometry.    I have discussed this patient's plan of care and discharge plan at IDT rounds today with Case Management, Nursing, Nursing leadership, and other members of the IDT team.    Consultants/Specialty  general surgery    Code Status  Full Code    Disposition  The patient is not medically cleared for discharge to home or a post-acute facility.  Anticipate discharge to: home with organized home healthcare and close outpatient follow-up    I have placed the appropriate orders for post-discharge needs.    Review of Systems  Review of Systems   Constitutional:  Negative for chills, diaphoresis, fever and malaise/fatigue.   HENT:  Negative for congestion and sore throat.    Eyes:  Negative for pain and discharge.   Respiratory:  Negative for cough, hemoptysis, sputum production, shortness of breath and wheezing.    Cardiovascular:  Positive for chest pain. Negative for palpitations, claudication and leg swelling.   Gastrointestinal:  Negative for abdominal pain, constipation, diarrhea, melena, nausea and vomiting.   Genitourinary:  Negative for dysuria, frequency and urgency.   Musculoskeletal:  Negative for back pain, joint pain, myalgias and neck pain.   Skin:  Negative for itching and rash.   Neurological:  Negative for dizziness, sensory change, speech change, focal weakness, loss of consciousness, weakness and headaches.   Endo/Heme/Allergies:  Does not bruise/bleed easily.   Psychiatric/Behavioral:  Negative for  depression, substance abuse and suicidal ideas.         Physical Exam  Temp:  [36.6 °C (97.9 °F)-37.3 °C (99.1 °F)] 37.1 °C (98.8 °F)  Pulse:  [80-96] 80  Resp:  [16-18] 16  BP: (133-178)/(66-89) 138/66  SpO2:  [86 %-96 %] 96 %    Physical Exam  Constitutional:       General: He is not in acute distress.     Appearance: He is not diaphoretic.      Comments: Frail, rib protuberance.   HENT:      Head: Normocephalic and atraumatic.      Mouth/Throat:      Mouth: Mucous membranes are dry.      Pharynx: No oropharyngeal exudate.   Eyes:      General: No scleral icterus.        Right eye: No discharge.         Left eye: No discharge.      Conjunctiva/sclera: Conjunctivae normal.      Pupils: Pupils are equal, round, and reactive to light.   Neck:      Thyroid: No thyromegaly.      Vascular: No JVD.      Trachea: No tracheal deviation.   Cardiovascular:      Rate and Rhythm: Normal rate and regular rhythm.      Heart sounds: Normal heart sounds. No murmur heard.     No friction rub. No gallop.   Pulmonary:      Effort: Pulmonary effort is normal. No respiratory distress.      Breath sounds: Normal breath sounds. No wheezing or rales.      Comments: Right chest tube to suction.  Chest:      Chest wall: No tenderness.   Abdominal:      General: Bowel sounds are normal. There is no distension.      Palpations: Abdomen is soft. There is no mass.      Tenderness: There is no abdominal tenderness. There is no guarding or rebound.   Musculoskeletal:         General: No tenderness. Normal range of motion.      Cervical back: Normal range of motion and neck supple.   Lymphadenopathy:      Cervical: No cervical adenopathy.   Skin:     General: Skin is warm and dry.      Capillary Refill: Capillary refill takes less than 2 seconds.      Findings: No erythema or rash.   Neurological:      General: No focal deficit present.      Mental Status: He is alert and oriented to person, place, and time.      Cranial Nerves: No cranial nerve  deficit.      Motor: No abnormal muscle tone.   Psychiatric:         Mood and Affect: Mood normal.         Behavior: Behavior normal.         Thought Content: Thought content normal.         Judgment: Judgment normal.         Fluids    Intake/Output Summary (Last 24 hours) at 9/9/2024 1639  Last data filed at 9/9/2024 1200  Gross per 24 hour   Intake 300 ml   Output 654 ml   Net -354 ml        Laboratory  Recent Labs     09/07/24  0332   WBC 9.6   RBC 4.00*   HEMOGLOBIN 13.2*   HEMATOCRIT 39.5*   MCV 98.8*   MCH 33.0   MCHC 33.4   RDW 49.2   PLATELETCT 218   MPV 10.3     Recent Labs     09/07/24  0332 09/08/24  0348 09/09/24  0644   SODIUM 136 135 132*   POTASSIUM 4.1 4.0 4.1   CHLORIDE 99 99 97   CO2 26 27 28   GLUCOSE 112* 104* 97   BUN 18 16 12   CREATININE 0.64 0.39* 0.27*   CALCIUM 9.2 8.5 8.2*                     Imaging  DX-CHEST-PORTABLE (1 VIEW)   Final Result         1. No pneumothorax.   2. Small amount of right pleural fluid and right basilar airspace disease again noted.   3. Severe emphysema.      DX-CHEST-PORTABLE (1 VIEW)   Final Result         1. Tiny right apical pneumothorax.   2. Small amount of right pleural fluid and right basilar airspace disease and noted.   3. Severe emphysema.      DX-CHEST-PORTABLE (1 VIEW)   Final Result         1.  Interstitial pulmonary parenchymal prominence suggest chronic underlying lung disease, component of interstitial edema and/or infiltrates not excluded.   2.  Trace right apical pneumothorax, thoracostomy tube in place.   3.  Trace bilateral pleural effusions, stable since prior study.   4.  Atherosclerosis      DX-CHEST-PORTABLE (1 VIEW)   Final Result         1.  Interstitial pulmonary parenchymal prominence suggest chronic underlying lung disease, component of interstitial edema and/or infiltrates not excluded.   2.  Trace bilateral pleural effusions, new since prior study.   3.  Atherosclerosis      DX-CHEST-PORTABLE (1 VIEW)   Final Result         1.   Interstitial pulmonary parenchymal prominence suggest chronic underlying lung disease, component of interstitial edema and/or infiltrates not excluded.      DX-CHEST-PORTABLE (1 VIEW)   Final Result      1.  Presents right chest tube      2.  Tiny right pneumothorax      CT-CHEST (THORAX) WITH   Final Result         1. Interval placement of right chest tube with small residual right pneumothorax.      2. Emphysematous changes with large bullae in the right middle lobe.   3. Patchy right basilar atelectasis.         DX-CHEST-PORTABLE (1 VIEW)   Final Result         No appreciable pneumothorax after right chest tube placement.      DX-CHEST-PORTABLE (1 VIEW)   Final Result      Stable large right pneumothorax.      DX-CHEST-PORTABLE (1 VIEW)   Final Result         Large right pneumothorax with collapsing of the right lung.            CRITICAL RESULT READ BACK: Preliminary findings discussed with and critical read back performed by Dr. VALERIA BOLANOS in the Emergency Department via telephone on 9/3/2024 12:08 PM           Assessment/Plan  * Primary spontaneous pneumothorax- (present on admission)  Assessment & Plan  Spontaneous likely due to bleb from emphysema and prior years of smoking.  S/P chest tube after initial catheter at OSH failed.  Monitor pleurovac + air leak, chest tube placed to suction.  Supplemental oxygen to keep SpO2 >89%  RT per protocol   Surgery consulting   Plan for thoracoscopy and pleurodesis 9/5. NPO at MN.  Increased pain control    Dehydration- (present on admission)  Assessment & Plan  Clinically dry mucus membranes, poor skin turgor.  Increased NS 75 to 125/hr.   Not currently eating, despite patient stating he ate lunch.  Nothing was eaten off his lunch tray.    Hyperglycemia  Assessment & Plan  Monitor labs    Elevated alkaline phosphatase level  Assessment & Plan  Alk Phos:178  Needs outpatient work up.  No current abdominal pain.    Hypokalemia  Assessment & Plan  9/3 K:3.5  Supplement and  monitor  Check Mg    COPD (chronic obstructive pulmonary disease) (HCC)- (present on admission)  Assessment & Plan  No exacerbation  RT per protocol  Monitor SpO2 and titrate oxygen to keep >89%  Nebulizer and inhalers    Leukocytosis  Assessment & Plan  Likely leukemoid reaction  Monitor am CBC and vitals.  Hold antibiotics for now.         VTE prophylaxis:    Xarelto 10mg daily as prophylaxis      I have performed a physical exam and reviewed and updated ROS and Plan today (9/9/2024). In review of yesterday's note (9/8/2024), there are no changes except as documented above.

## 2024-09-09 NOTE — CARE PLAN
The patient is Stable - Low risk of patient condition declining or worsening    Shift Goals  Clinical Goals: decrease O2 requirements.  Patient Goals: rest, comfort  Family Goals: Updates    Progress made toward(s) clinical / shift goals:  Requested RT see patient, neb ordered. Patient now using O2 mask and keeping saturations >90% on 5-6L.     Patient is not progressing towards the following goals:

## 2024-09-09 NOTE — DIETARY
Nutrition Update: Follow-up for Low BMI  Day 6 of admit.  Joaquín Hartman is a 84 y.o. male with admitting DX of Pneumothorax on right.    Pt continues on a Regular diet with oral nutrition supplements  RD attempted to see pt 9/6, however pt was occupied with staff x 2.  I met with pt today at bedside.   Pt was sitting up in bedside chair. He appeared very thin with severe fat loss evidenced by prominent zygomatic arches, scooping temporals, hollowed buccals, and visible ribs; severe muscle loss evidenced by prominent clavicles, squared shoulders and depression to dorsal hand area.  I attempted to obtain wt and PO hx from pt using , however pt waved me away and declined to participate in interview.   Lunch tray observed on bedside table was >75% consumed. PO per flow sheet shows pt is eating 25-75% of meals.    Malnutrition risk: Pt with severe, chronic malnutrition, likely related to hypermetabolic disease state of COPD, AEB physical markers for severe fat and severe muscle wasting.    Problem: Nutritional:  Goal: Achieve adequate nutritional intake  Description: Patient will consume 50% of meals  Outcome: Progressing as expected    Recommendations/Plan:  Continue oral nutrition supplements.  Encourage intake of meals and supplements.  Document intake of all meals and supplements as % taken in ADLs to provide interdisciplinary communication across all shifts.   Monitor weight.  Nutrition rep will continue to see patient for ongoing meal and snack preferences.     RD will follow.

## 2024-09-10 ENCOUNTER — APPOINTMENT (OUTPATIENT)
Dept: RADIOLOGY | Facility: MEDICAL CENTER | Age: 84
DRG: 166 | End: 2024-09-10
Attending: HOSPITALIST
Payer: MEDICARE

## 2024-09-10 LAB
ANION GAP SERPL CALC-SCNC: 7 MMOL/L (ref 7–16)
BUN SERPL-MCNC: 12 MG/DL (ref 8–22)
CALCIUM SERPL-MCNC: 8.2 MG/DL (ref 8.5–10.5)
CHLORIDE SERPL-SCNC: 96 MMOL/L (ref 96–112)
CO2 SERPL-SCNC: 31 MMOL/L (ref 20–33)
CREAT SERPL-MCNC: 0.35 MG/DL (ref 0.5–1.4)
GFR SERPLBLD CREATININE-BSD FMLA CKD-EPI: 112 ML/MIN/1.73 M 2
GLUCOSE SERPL-MCNC: 101 MG/DL (ref 65–99)
POTASSIUM SERPL-SCNC: 4.3 MMOL/L (ref 3.6–5.5)
SODIUM SERPL-SCNC: 134 MMOL/L (ref 135–145)

## 2024-09-10 PROCEDURE — 700102 HCHG RX REV CODE 250 W/ 637 OVERRIDE(OP): Performed by: HOSPITALIST

## 2024-09-10 PROCEDURE — 97162 PT EVAL MOD COMPLEX 30 MIN: CPT

## 2024-09-10 PROCEDURE — A9270 NON-COVERED ITEM OR SERVICE: HCPCS | Performed by: HOSPITALIST

## 2024-09-10 PROCEDURE — 36415 COLL VENOUS BLD VENIPUNCTURE: CPT

## 2024-09-10 PROCEDURE — 80048 BASIC METABOLIC PNL TOTAL CA: CPT

## 2024-09-10 PROCEDURE — 94640 AIRWAY INHALATION TREATMENT: CPT

## 2024-09-10 PROCEDURE — 94669 MECHANICAL CHEST WALL OSCILL: CPT

## 2024-09-10 PROCEDURE — 99232 SBSQ HOSP IP/OBS MODERATE 35: CPT | Performed by: HOSPITALIST

## 2024-09-10 PROCEDURE — 97166 OT EVAL MOD COMPLEX 45 MIN: CPT

## 2024-09-10 PROCEDURE — 97535 SELF CARE MNGMENT TRAINING: CPT

## 2024-09-10 PROCEDURE — 71045 X-RAY EXAM CHEST 1 VIEW: CPT

## 2024-09-10 PROCEDURE — 700101 HCHG RX REV CODE 250: Performed by: HOSPITALIST

## 2024-09-10 PROCEDURE — 770001 HCHG ROOM/CARE - MED/SURG/GYN PRIV*

## 2024-09-10 PROCEDURE — 74018 RADEX ABDOMEN 1 VIEW: CPT

## 2024-09-10 PROCEDURE — 700111 HCHG RX REV CODE 636 W/ 250 OVERRIDE (IP): Performed by: HOSPITALIST

## 2024-09-10 PROCEDURE — 99024 POSTOP FOLLOW-UP VISIT: CPT | Performed by: REGISTERED NURSE

## 2024-09-10 RX ORDER — BISACODYL 10 MG
10 SUPPOSITORY, RECTAL RECTAL DAILY
Status: DISCONTINUED | OUTPATIENT
Start: 2024-09-10 | End: 2024-09-15 | Stop reason: HOSPADM

## 2024-09-10 RX ADMIN — SENNOSIDES AND DOCUSATE SODIUM 2 TABLET: 50; 8.6 TABLET ORAL at 18:21

## 2024-09-10 RX ADMIN — TIOTROPIUM BROMIDE INHALATION SPRAY 5 MCG: 3.12 SPRAY, METERED RESPIRATORY (INHALATION) at 11:10

## 2024-09-10 RX ADMIN — HYDROCODONE BITARTRATE AND ACETAMINOPHEN 1 TABLET: 5; 325 TABLET ORAL at 03:46

## 2024-09-10 RX ADMIN — KETOROLAC TROMETHAMINE 1 DROP: 0.5 SOLUTION OPHTHALMIC at 04:10

## 2024-09-10 RX ADMIN — IPRATROPIUM BROMIDE AND ALBUTEROL SULFATE 3 ML: 2.5; .5 SOLUTION RESPIRATORY (INHALATION) at 11:09

## 2024-09-10 RX ADMIN — BUDESONIDE 0.5 MG: 0.5 SUSPENSION RESPIRATORY (INHALATION) at 11:09

## 2024-09-10 RX ADMIN — RIVAROXABAN 10 MG: 10 TABLET, FILM COATED ORAL at 18:21

## 2024-09-10 RX ADMIN — BISACODYL 10 MG: 10 SUPPOSITORY RECTAL at 15:01

## 2024-09-10 RX ADMIN — BUDESONIDE 0.5 MG: 0.5 SUSPENSION RESPIRATORY (INHALATION) at 19:11

## 2024-09-10 RX ADMIN — PREDNISOLONE ACETATE 1 DROP: 10 SUSPENSION/ DROPS OPHTHALMIC at 04:09

## 2024-09-10 ASSESSMENT — PAIN DESCRIPTION - PAIN TYPE
TYPE: ACUTE PAIN

## 2024-09-10 ASSESSMENT — ENCOUNTER SYMPTOMS
HEADACHES: 0
BACK PAIN: 0
FEVER: 0
BRUISES/BLEEDS EASILY: 0
SPEECH CHANGE: 0
CONSTIPATION: 0
MYALGIAS: 0
PALPITATIONS: 0
SORE THROAT: 0
WHEEZING: 0
COUGH: 0
HEMOPTYSIS: 0
DIZZINESS: 0
SENSORY CHANGE: 0
CLAUDICATION: 0
VOMITING: 0
DEPRESSION: 0
ABDOMINAL PAIN: 0
SPUTUM PRODUCTION: 0
CHILLS: 0
EYE DISCHARGE: 0
FOCAL WEAKNESS: 0
DIARRHEA: 0
WEAKNESS: 0
LOSS OF CONSCIOUSNESS: 0
EYE PAIN: 0
NECK PAIN: 0
DIAPHORESIS: 0
SHORTNESS OF BREATH: 0
NAUSEA: 0

## 2024-09-10 ASSESSMENT — GAIT ASSESSMENTS
GAIT LEVEL OF ASSIST: STANDBY ASSIST
DISTANCE (FEET): 250
ASSISTIVE DEVICE: FRONT WHEEL WALKER
DEVIATION: BRADYKINETIC

## 2024-09-10 ASSESSMENT — COGNITIVE AND FUNCTIONAL STATUS - GENERAL
SUGGESTED CMS G CODE MODIFIER DAILY ACTIVITY: CK
MOBILITY SCORE: 21
PERSONAL GROOMING: A LITTLE
MOVING FROM LYING ON BACK TO SITTING ON SIDE OF FLAT BED: A LITTLE
HELP NEEDED FOR BATHING: A LITTLE
CLIMB 3 TO 5 STEPS WITH RAILING: A LITTLE
TOILETING: A LITTLE
DRESSING REGULAR LOWER BODY CLOTHING: A LITTLE
TURNING FROM BACK TO SIDE WHILE IN FLAT BAD: A LITTLE
DRESSING REGULAR UPPER BODY CLOTHING: A LITTLE
SUGGESTED CMS G CODE MODIFIER MOBILITY: CJ
DAILY ACTIVITIY SCORE: 19

## 2024-09-10 ASSESSMENT — LIFESTYLE VARIABLES: SUBSTANCE_ABUSE: 0

## 2024-09-10 ASSESSMENT — ACTIVITIES OF DAILY LIVING (ADL): TOILETING: INDEPENDENT

## 2024-09-10 NOTE — PROGRESS NOTES
"  DATE: 9/10/2024    84 y.o. male admitted 9/3/2024 with spontaneous pneumothorax. Non-trauma.     9/3 Right tube thoracostomy  9/5 Thoracoscopic talc and abrasive pleurodesis     INTERVAL EVENTS:  CXR with new small pneumothorax, CT back to suction.  IS approximately 300-500 cc.  5 L Oxymask  Add PEP therapy.    REVIEW OF SYSTEMS:  Comprehensive review of systems is negative with the exception of the aforementioned HPI, PMH, and PSH bullets in accordance with CMS guidelines.    PHYSICAL EXAMINATION:  Vital Signs: BP (!) 141/68   Pulse 74   Temp 36.7 °C (98.1 °F) (Temporal)   Resp 18   Ht 1.68 m (5' 6.14\")   Wt 48.6 kg (107 lb 2.3 oz)   SpO2 95%   Physical Exam  Vitals and nursing note reviewed.   Constitutional:       General: He is awake. He is not in acute distress.     Appearance: He is not ill-appearing, toxic-appearing or diaphoretic.      Comments: Elderly, frail and deconditioned   Pulmonary:      Effort: Pulmonary effort is normal. No respiratory distress.      Comments: Chest tube on water seal, no air leak. Placed back to suction.     Chest:      Chest wall: Tenderness present.   Abdominal:      General: There is no distension.      Tenderness: There is no abdominal tenderness. There is no guarding or rebound.   Musculoskeletal:      Comments: Moves all extremities   Skin:     General: Skin is warm and dry.      Capillary Refill: Capillary refill takes less than 2 seconds.   Neurological:      Mental Status: He is alert.      Comments: Conversant   Psychiatric:         Behavior: Behavior is cooperative.         LABORATORY VALUES:        Recent Labs     09/08/24  0348 09/09/24  0644 09/10/24  0138   SODIUM 135 132* 134*   POTASSIUM 4.0 4.1 4.3   CHLORIDE 99 97 96   CO2 27 28 31   GLUCOSE 104* 97 101*   BUN 16 12 12   CREATININE 0.39* 0.27* 0.35*   CALCIUM 8.5 8.2* 8.2*               IMAGING:  [id number] VIEW   Final Result      No evidence of bowel obstruction.                  DX-CHEST-PORTABLE (1 " VIEW)   Final Result         1. Tiny, 9 mm right apical pneumothorax.   2. Small amount of right pleural fluid and right basilar airspace disease again noted.   3. Severe emphysema.         DX-CHEST-PORTABLE (1 VIEW)   Final Result         1. No pneumothorax.   2. Small amount of right pleural fluid and right basilar airspace disease again noted.   3. Severe emphysema.      DX-CHEST-PORTABLE (1 VIEW)   Final Result         1. Tiny right apical pneumothorax.   2. Small amount of right pleural fluid and right basilar airspace disease and noted.   3. Severe emphysema.      DX-CHEST-PORTABLE (1 VIEW)   Final Result         1.  Interstitial pulmonary parenchymal prominence suggest chronic underlying lung disease, component of interstitial edema and/or infiltrates not excluded.   2.  Trace right apical pneumothorax, thoracostomy tube in place.   3.  Trace bilateral pleural effusions, stable since prior study.   4.  Atherosclerosis      DX-CHEST-PORTABLE (1 VIEW)   Final Result         1.  Interstitial pulmonary parenchymal prominence suggest chronic underlying lung disease, component of interstitial edema and/or infiltrates not excluded.   2.  Trace bilateral pleural effusions, new since prior study.   3.  Atherosclerosis      DX-CHEST-PORTABLE (1 VIEW)   Final Result         1.  Interstitial pulmonary parenchymal prominence suggest chronic underlying lung disease, component of interstitial edema and/or infiltrates not excluded.      DX-CHEST-PORTABLE (1 VIEW)   Final Result      1.  Presents right chest tube      2.  Tiny right pneumothorax      CT-CHEST (THORAX) WITH   Final Result         1. Interval placement of right chest tube with small residual right pneumothorax.      2. Emphysematous changes with large bullae in the right middle lobe.   3. Patchy right basilar atelectasis.         DX-CHEST-PORTABLE (1 VIEW)   Final Result         No appreciable pneumothorax after right chest tube placement.       DX-CHEST-PORTABLE (1 VIEW)   Final Result      Stable large right pneumothorax.      DX-CHEST-PORTABLE (1 VIEW)   Final Result         Large right pneumothorax with collapsing of the right lung.            CRITICAL RESULT READ BACK: Preliminary findings discussed with and critical read back performed by Dr. VALERIA BOLANOS in the Emergency Department via telephone on 9/3/2024 12:08 PM          ASSESSMENT AND PLAN:  * Primary spontaneous pneumothorax- (present on admission)  Assessment & Plan  Large spontaneous pneumothorax   Attempted decompression in ED.  Chest tube placed in ED. (+) air leak noted/no drainage. Placed to suction.  9/4 CXR with no pneumothorax.  CT to suction with no air leak.  Continue chest tube to suction.    9/5 OR for thoracoscopic talc and abrasive pleurodesis. Bleb resection not indicated.   9/9 Chest tube to water seal. No pneumothorax on morning CXR.  9/10 Small 9mm pneumothorax.  -Place chest tube to water seal.   -PEP therapy.  Daily chest xray's.    Trauma- (present on admission)  Assessment & Plan  Denies trauma         ____________________________________     BEN Cooper    DD: 9/9/2024  10:41 AM

## 2024-09-10 NOTE — PROGRESS NOTES
Report received from donald RN  Pt AAOx4, Alert. Responds appropriately. French speaking  5L oxymask, increased WOB with exertion.   IS at bedside  Right CT to waterseal. patent. No air leak noted.  Denies pain  +void, +flatus, No BM. Abdomen rounded, semi-firm. +bowel sounds.  Regular diet, tolerating well. No n/v  Ambulating to BR with steady gait. Standby with [facility]  Calls appropriately for assistance  SCDs on      POC reviewed, education provided. Bed locked and in low position, pt instructed to call for assistance. Comprehension verbalized. Call light within reach, all needs met at this time.

## 2024-09-10 NOTE — DISCHARGE PLANNING
Addendum:  9-10-24/1452  RN ANGELLA met with patient at bedside.  Pt sitting up in chair.  He is currently receiving O2 5LNC.  Pt stated he has been visiting/living with his daughter, Padmini in a 1 story house in [facility] since 3-15-24.  She works Monday to Friday from 06 to 1400 or 08 to 1600.  Pt resides in [facility] with his other daughter.  Pt is independent with ADLs and IADLs at [facility].  He does not drive.  Pt does not have a PCP and no rx coverage.  Currently on Xarelto.  I provided education on home health, FWW and home oxygen.  Pt agreeable.  He signed choice form for [facility] for FWW, [facility] for home health and [facility], [facility], [facility], [facility] and [facility].  Forms faxed to [facility].  Pt discussed with Dr. Bird.  New PCP appointment set for 9-16-24 at 1040 with Dr. Nikolas Ramon.      Case Management Discharge Planning    Admission Date: 9/3/2024  GMLOS: 8.4  ALOS: 7    6-Clicks ADL Score: 24  6-Clicks Mobility Score: 22      Anticipated Discharge Dispo: Discharge Disposition: Disch to IP rehab facility or distinct part unit (62)    Action(s) Taken:   SNF and PMR referrals placed per protocol.  Request to [facility] to please send referrals to [place] [facility].    Medically Clear: No    Next Steps:   Follow up on referrals.    Barriers to Discharge: Medical clearance, Pending Placement, Pending PT Evaluation, and DME    Care Transition Team Assessment    Information Source  Orientation Level: Oriented X4  Who is responsible for making decisions for patient? : Patient    Readmission Evaluation  Is this a readmission?: No    Elopement Risk  Legal Hold: (Pended) No  Ambulatory or Self Mobile in Wheelchair: (Pended) Yes  Disoriented: (Pended) No  Psychiatric Symptoms: (Pended) None  History of Wandering: (Pended) No  Elopement this Admit: (Pended) No  Vocalizing Wanting to Leave: (Pended) No  Displays Behaviors, Body Language Wanting to Leave: (Pended) No-Not at Risk for Elopement  Elopement Risk:  (Pended) Not at Risk for Elopement    Interdisciplinary Discharge Planning  Lives with - Patient's Self Care Capacity: Adult Children  Patient or legal guardian wants to designate a caregiver: No  Support Systems: Friends / Neighbors, Family Member(s)  Housing / Facility: [address]  Prior Services: Home-Independent    Discharge Preparedness  What is your plan after discharge?: Uncertain - pending medical team collaboration  What are your discharge supports?: Child  Prior Functional Level: Ambulatory, Independent with Activities of Daily Living, Independent with Medication Management  Difficulity with ADLs: Walking  Difficulity with IADLs: Cooking, Driving, Laundry, Managing medication, Shopping    Functional Assesment  Prior Functional Level: Ambulatory, Independent with Activities of Daily Living, Independent with Medication Management    Finances  Financial Barriers to Discharge: No  Prescription Coverage: No    Vision / Hearing Impairment  Vision Impairment : No  Right Eye Vision: (Pended) Impaired  Left Eye Vision: (Pended) Impaired  Hearing Impairment : No  Hearing Impairment: Both Ears         Advance Directive  Advance Directive?: None    Domestic Abuse  Possible Abuse/Neglect Reported to:: Not Applicable    Psychological Assessment  History of Substance Abuse: None  History of Psychiatric Problems: No  Non-compliant with Treatment: No  Newly Diagnosed Illness: Yes    Discharge Risks or Barriers  Discharge risks or barriers?: No  Patient risk factors: Vulnerable adult    Anticipated Discharge Information  Discharge Disposition: Disch to  rehab facility or distinct part unit (62)

## 2024-09-10 NOTE — DISCHARGE PLANNING
[facility] Transitional Care coordinator      PM&R referral from Dr. Bird . Potential Rehab Diagnosis for IRF Pulmonary debility. Chart review indicates pending therapy evaluations, Chest tube is a barrier. Insurance provider Medicare . Return to community support per CM note extensive family support. No one identified on face sheet. . Physiatry consult pending at this time. Thank you for the consultation. TCC to follow. Mobility and self care scores are 22/24 indicating limited therapy need for IRF level of care. Potentially home with OP support when medically cleared. TCC to follow.

## 2024-09-10 NOTE — DISCHARGE PLANNING
@0958  Per CM Eevlyn submitted SNF blanket to [facility]     @1501  Received Choice form at 1450  Agency/Facility Name: [facility]   Referral sent per Choice form @ 1500   Scanned choice form in media.    @1506  Scanned FWW choice/Oxygen  form in media- Pending orders

## 2024-09-10 NOTE — THERAPY
Occupational Therapy   Initial Evaluation     Patient Name: Joaquín Hartman  Age:  84 y.o., Sex:  male  Medical Record #: [id number]  Today's Date: 9/10/2024     Precautions  Precautions: Fall Risk, Chest Tube    Assessment  Patient is an 84 y.o. male with a diagnosis of primary spontaneous pneumothorax, no trauma. Additional factors influencing patient status / progress: PMHx includes COPD and prior smoker.      During OT eval, pt presented with decreased activity tolerance, strength and functional mobility but was able to demo ADLs and related functional mobility with overall SPV with the FWW. Pt needed increased time and some effort to change socks while seated in the chair but did not require assistance. Pt demo'd toilet transfer with SPV. Pt reports his daughter can assist if needed when she is not at work. Recommend home health to address higher level IADLs and home safety. Patient will not be actively followed for occupational therapy services at this time, however may be seen if requested by physician for 1 more visit within 30 days to address any discharge or equipment needs.     Plan    Occupational Therapy Initial Treatment Plan   Duration: (P) Discharge Needs Only    DC Equipment Recommendations: (P) Unable to determine at this time  Discharge Recommendations: (P) Recommend home health for continued occupational therapy services     Subjective    Pt agreeable to OT eval.      Objective     09/10/24 1002    Services   Is patient using  services for this encounter? Yes   Language Interpreted Swazi    Name Gem #[id number]    Mode iPad   Refusal signed by patient? No   Content of Interpretation (select all) History/Visit information;Patient Education  (OT eval)   Initial Contact Note    Initial Contact Note Order Received and Verified, Evaluation Only - Patient Does Not Require Further Acute Occupational Therapy at this Time.  However, May Benefit from Post Acute  Therapy for Higher Level Functional Deficits.   Prior Living Situation   Prior Services Home-Independent   Housing / Facility 1 Story House   Steps Into Home 3   Steps In Home 0   Bathroom Set up Bathtub / Shower Combination   Equipment Owned None   Lives with - Patient's Self Care Capacity Adult Children   Comments Pt reports he lives with his daughter who works during the day but can otherwise assist   Prior Level of ADL Function   Self Feeding Independent   Grooming / Hygiene Independent   Bathing Independent   Dressing Independent   Toileting Independent   Prior Level of IADL Function   Medication Management Unable To Determine At This Time   Laundry Independent   Kitchen Mobility Independent   Finances Independent   Home Management Independent   Shopping Independent   Prior Level Of Mobility Independent Without Device in Community   Driving / Transportation Unable To Determine At This Time   Occupation (Pre-Hospital Vocational) Retired Due To Age   Vitals   Pulse Oximetry 93 %   O2 (LPM) 5   O2 Delivery Device Nasal Cannula   Pain   Intervention Repositioned   Pain 0 - 10 Group   Location Rib Cage   Location Orientation Right   Pain Rating Scale (NPRS)   (did not quantify)   Description Aching;Constant   Comfort Goal Comfort with Movement;Perform Activity   Non Verbal Descriptors   Non Verbal Scale  Calm   Cognition    Cognition / Consciousness WDL   Level of Consciousness Alert   Comments Pleasant and cooperative   Active ROM Upper Body   Active ROM Upper Body  WDL   Strength Upper Body   Upper Body Strength  WDL   Sensation Upper Body   Upper Extremity Sensation  Not Tested   Upper Body Muscle Tone   Upper Body Muscle Tone  WDL   Neurological Concerns   Neurological Concerns No   Coordination Upper Body   Coordination WDL   Balance Assessment   Sitting Balance (Static) Fair +   Sitting Balance (Dynamic) Fair +   Standing Balance (Static) Fair   Standing Balance (Dynamic) Fair -   Weight Shift Sitting Good    Weight Shift Standing Fair   Comments FWW   Bed Mobility    Scooting Supervised   Comments in chair pre/post   ADL Assessment   Grooming   (declined, reports he already completed)   Upper Body Dressing Minimal Assist  (gown change)   Lower Body Dressing Supervision  (change socks)   Toileting Supervision  (using urinal)   How much help from another person does the patient currently need...   Putting on and taking off regular lower body clothing? 3   Bathing (including washing, rinsing, and drying)? 3   Toileting, which includes using a toilet, bedpan, or urinal? 3   Putting on and taking off regular upper body clothing? 3   Taking care of personal grooming such as brushing teeth? 3   Eating meals? 4   6 Clicks Daily Activity Score 19   Functional Mobility   Sit to Stand Supervised   Bed, Chair, Wheelchair Transfer Supervised   Toilet Transfers Supervised   Transfer Method Stand Step   Mobility in room with FWW and SPV   Activity Tolerance   Sitting in Chair pre/post   Education Group   Role of Occupational Therapist Patient Response Patient;Acceptance;Explanation;Verbal Demonstration   Occupational Therapy Initial Treatment Plan    Duration Discharge Needs Only   Problem List   Problem List Decreased Functional Mobility;Decreased Activity Tolerance;Decreased Homemaking Skills   Anticipated Discharge Equipment and Recommendations   DC Equipment Recommendations Unable to determine at this time   Discharge Recommendations Recommend home health for continued occupational therapy services   Interdisciplinary Plan of Care Collaboration   IDT Collaboration with  Nursing   Patient Position at End of Therapy Seated;Call Light within Reach;Tray Table within Reach;Phone within Reach   Collaboration Comments RN aware   Session Information   Date / Session Number  9/10 d/c needs

## 2024-09-10 NOTE — DOCUMENTATION QUERY
"                                                                         [facility]                                                                       Query Response Note      PATIENT:               MARIA SAMANIEGO  ACCT #:                  [id number]  MRN:                     [id number]  :                      1940  ADMIT DATE:       9/3/2024 11:42 AM  DISCH DATE:          RESPONDING  PROVIDER #:        [id number]           QUERY TEXT:    Malnutrition is documented in the Medical Record.  Please specify the severity.    The patient's Clinical Indicators include:  85yo with dx of spontaneous pneumothorax, emphysema, leukemoid reaction     RD note Malnutrition Risk\" Pt with severe, chronic malnutrition, likely related to hypermetabolic disease state of COPD, AEB physical markers for severe fat and severe muscle wasting\"    Risk factors: advance age, spontaneous pneumothorax, emphysema  Treatments: supplemental oxygen, chest tube, imaging, labwork, monitoring, RD consultation, supplements    If you agree with the above dx please document in the medical record.     Contact me with questions.     Thank you,  Carla Segundo, MARYP, CDI  [email or web address]  Options provided:   -- Mild protein calorie malnutrition   -- Moderate protein calorie malnutrition   -- Severe protein calorie malnutrition   -- Other explanation, (please specify other explanation)   -- Unable to determine      Query created by: Carla Segundo on 9/10/2024 11:44 AM    RESPONSE TEXT:    Severe protein calorie malnutrition          Electronically signed by:  GLORIA LLANES MD 9/10/2024 3:30 PM              "

## 2024-09-10 NOTE — DISCHARGE PLANNING
Follow. up for post acute services PT/OT evaluations recommending home with outpatient when medical cleared. In the event of interval decline with therapy prior to being medical cleared please reach back to [facility] for follow up. No physiatry consult per protocol.

## 2024-09-10 NOTE — CARE PLAN
The patient is Watcher - Medium risk of patient condition declining or worsening    Shift Goals  Clinical Goals: CT maintenance, decrease o2 requirements, ambulation  Patient Goals: rest  Family Goals: Updates    Progress made toward(s) clinical / shift goals:  CT to waterseal. 5L oxymask. Denies pain, rested comfortably throughoutshift. Family at bedside. Safety maintained    Problem: Mobility  Goal: Patient's capacity to carry out activities will improve  Description: Target End Date:  Prior to discharge or change in level of care    1.  Assess for barriers to mobility/activity  2.  Implement activity per interdisciplinary team recommendations  3.  Target activity level identified and patient/family/caregiver aware of goal  4.  Provide assistive devices  5.  Instruct patient/caregiver on proper use of assistive/adaptive devices  6.  Schedule activities and rest periods to decrease effects of fatigue  7.  Encourage mobilization to extent of ability  8.  Maintain proper body alignment  9.  Provide adequate pain management to allow progressive mobilization  10. Implement pace maker precautions as needed  Outcome: Progressing

## 2024-09-10 NOTE — PROGRESS NOTES
"Bedside report received.  Assessment complete.  A&O x 4. Patient calls appropriately.  Patient is Czech speaking.  Patient ambulates with x1-2 assist. Bed alarm on.   Patient has a right chest tube to water seal.  Denies N&V. Tolerating regular diet.  + void, - flatus, - BM.  Patient denies SOB.  SCD's on.  Patient is pleasant and cooperative with care plan.  Review plan with of care with patient. Call light and personal belongings within reach. Hourly rounding in place. All needs met at this time.    /68   Pulse 78   Temp 36.6 °C (97.9 °F) (Temporal)   Resp 16   Ht 1.68 m (5' 6.14\")   Wt 48.6 kg (107 lb 2.3 oz)   SpO2 96%   BMI 17.22 kg/m²     "

## 2024-09-10 NOTE — THERAPY
"Physical Therapy   Initial Evaluation     Patient Name: Joaquín Hartman  Age:  84 y.o., Sex:  male  Medical Record #: [id number]  Today's Date: 9/10/2024     Precautions  Precautions: Fall Risk;Chest Tube    Assessment  Patient is 84 y.o. male admitted with complaints of SOB found to have spontaneous right pneumothorax. Pt seen s/p chest tube placement. PMH includes COPD.     Patient received in bed and agreeable to PT session. Pt able to mobilize with SBA-SPV and FWW; pt ambulated with steady gait and no LOB. Pt reporting increased SOB despite VSS; cues for PLB, rest breaks, activity pacing. Pt is primarily limited by SOB and impaired functional strength and activity tolerance. Anticipate pt will be able to return home with HHPT. Will monitor for dc needs after chest tube removal.     Plan    Physical Therapy Initial Treatment Plan   Duration: (P) Discharge Needs Only    DC Equipment Recommendations: Front-Wheel Walker  Discharge Recommendations: Recommend home health for continued physical therapy services       Subjective    \"Normally I am very independent\".      Objective       09/10/24 0915    Services   Is patient using  services for this encounter? Yes   Language Interpreted Welsh    Name Vikram [id number]    Mode iPad   Refusal signed by patient? No   Content of Interpretation (select all) History/Visit information;Patient Education;Consent;Other  (PT eval)   Initial Contact Note    Initial Contact Note Order Received and Verified, Evaluation Only - Patient Does Not Require Further Acute Physical Therapy at this Time.  However, May Benefit from Post Acute Therapy for Higher Level Functional Deficits.   Precautions   Precautions Fall Risk;Chest Tube   Vitals   Patient BP Position Sitting   Blood Pressure  121/60   O2 (LPM) 5   O2 Delivery Device Oxymask   Vitals Comments Pt required 8L O2 during ambulation to maintain SpO2>90%   Pain 0 - 10 Group   Therapist Pain " Assessment Post Activity Pain Same as Prior to Activity;Nurse Notified  (pain not rated)   Prior Living Situation   Prior Services Home-Independent   Housing / Facility 1 Story House   Steps Into Home 3   Equipment Owned None   Lives with - Patient's Self Care Capacity Adult Children   Comments pt reports he lives with his adult daughter who works during the day; pt is home alone during the day   Prior Level of Functional Mobility   Bed Mobility Independent   Transfer Status Independent   Ambulation Independent   Ambulation Distance community   Assistive Devices Used None   Stairs Independent   Cognition    Cognition / Consciousness WDL   Level of Consciousness Alert   Comments pleasant and participatory   Active ROM Lower Body    Active ROM Lower Body  WDL   Strength Lower Body   Lower Body Strength  X   Gross Strength Generalized Weakness, Equal Bilaterally   Comments functional for mobility   Sensation Lower Body   Lower Extremity Sensation   WDL   Lower Body Muscle Tone   Lower Body Muscle Tone  WDL   Balance Assessment   Sitting Balance (Static) Fair   Sitting Balance (Dynamic) Fair   Standing Balance (Static) Fair   Standing Balance (Dynamic) Fair -   Weight Shift Sitting Good   Weight Shift Standing Fair   Comments w/FWW   Bed Mobility    Supine to Sit Standby Assist   Scooting Supervised   Comments HOB slightly elevated, pt reports he has a recliner chair at home   Gait Analysis   Gait Level Of Assist Standby Assist   Assistive Device Front Wheel Walker   Distance (Feet) 250   # of Times Distance was Traveled 1   Deviation Bradykinetic   Weight Bearing Status no restrictions   Functional Mobility   Sit to Stand Supervised   Bed, Chair, Wheelchair Transfer Supervised   Mobility bed > ambulate > chair   6 Clicks Assessment - How much HELP from from another person do you currently need... (If the patient hasn't done an activity recently, how much help from another person do you think he/she would need if he/she  tried?)   Turning from your back to your side while in a flat bed without using bedrails? 3   Moving from lying on your back to sitting on the side of a flat bed without using bedrails? 3   Moving to and from a bed to a chair (including a wheelchair)? 4   Standing up from a chair using your arms (e.g., wheelchair, or bedside chair)? 4   Walking in hospital room? 4   Climbing 3-5 steps with a railing? 3   6 clicks Mobility Score 21   Education Group   Education Provided Role of Physical Therapist   Role of Physical Therapist Patient Response Patient;Acceptance;Explanation;Verbal Demonstration   Additional Comments Educated pt on importance of frequent mobility, mobilizing to a chair for meals, PLB, use of IS, proper use of FWW   Physical Therapy Initial Treatment Plan    Duration Discharge Needs Only   Problem List    Problems Decreased Activity Tolerance;Impaired Ambulation   Anticipated Discharge Equipment and Recommendations   DC Equipment Recommendations Front-Wheel Walker   Discharge Recommendations Recommend home health for continued physical therapy services   Interdisciplinary Plan of Care Collaboration   IDT Collaboration with  Nursing   Patient Position at End of Therapy Seated;Chair Alarm On;Call Light within Reach;Tray Table within Reach;Phone within Reach   Collaboration Comments RN updated   Session Information   Date / Session Number  9/10- dc needs

## 2024-09-10 NOTE — CARE PLAN
Problem: Hyperinflation  Goal: Prevent or improve atelectasis  Description: Target End Date:  3 to 4 days    1. Instruct incentive spirometry usage  2.  Perform hyperinflation therapy as indicated  Outcome: Progressing   Pep qid

## 2024-09-11 ENCOUNTER — APPOINTMENT (OUTPATIENT)
Dept: RADIOLOGY | Facility: MEDICAL CENTER | Age: 84
DRG: 166 | End: 2024-09-11
Attending: HOSPITALIST
Payer: MEDICARE

## 2024-09-11 PROBLEM — Z71.89 ADVANCE CARE PLANNING: Status: ACTIVE | Noted: 2024-09-11

## 2024-09-11 LAB
ALBUMIN SERPL BCP-MCNC: 2.7 G/DL (ref 3.2–4.9)
ANION GAP SERPL CALC-SCNC: 8 MMOL/L (ref 7–16)
BUN SERPL-MCNC: 12 MG/DL (ref 8–22)
CALCIUM ALBUM COR SERPL-MCNC: 9.4 MG/DL (ref 8.5–10.5)
CALCIUM SERPL-MCNC: 8.4 MG/DL (ref 8.5–10.5)
CHLORIDE SERPL-SCNC: 100 MMOL/L (ref 96–112)
CO2 SERPL-SCNC: 28 MMOL/L (ref 20–33)
CREAT SERPL-MCNC: 0.41 MG/DL (ref 0.5–1.4)
ERYTHROCYTE [DISTWIDTH] IN BLOOD BY AUTOMATED COUNT: 49 FL (ref 35.9–50)
GFR SERPLBLD CREATININE-BSD FMLA CKD-EPI: 107 ML/MIN/1.73 M 2
GLUCOSE SERPL-MCNC: 102 MG/DL (ref 65–99)
HCT VFR BLD AUTO: 35.7 % (ref 42–52)
HGB BLD-MCNC: 11.8 G/DL (ref 14–18)
MAGNESIUM SERPL-MCNC: 1.7 MG/DL (ref 1.5–2.5)
MCH RBC QN AUTO: 32.5 PG (ref 27–33)
MCHC RBC AUTO-ENTMCNC: 33.1 G/DL (ref 32.3–36.5)
MCV RBC AUTO: 98.3 FL (ref 81.4–97.8)
PHOSPHATE SERPL-MCNC: 3.2 MG/DL (ref 2.5–4.5)
PLATELET # BLD AUTO: 279 K/UL (ref 164–446)
PMV BLD AUTO: 9.4 FL (ref 9–12.9)
POTASSIUM SERPL-SCNC: 4.1 MMOL/L (ref 3.6–5.5)
RBC # BLD AUTO: 3.63 M/UL (ref 4.7–6.1)
SODIUM SERPL-SCNC: 136 MMOL/L (ref 135–145)
WBC # BLD AUTO: 7 K/UL (ref 4.8–10.8)

## 2024-09-11 PROCEDURE — 71045 X-RAY EXAM CHEST 1 VIEW: CPT

## 2024-09-11 PROCEDURE — 94669 MECHANICAL CHEST WALL OSCILL: CPT

## 2024-09-11 PROCEDURE — 700102 HCHG RX REV CODE 250 W/ 637 OVERRIDE(OP): Performed by: HOSPITALIST

## 2024-09-11 PROCEDURE — A9270 NON-COVERED ITEM OR SERVICE: HCPCS | Performed by: HOSPITALIST

## 2024-09-11 PROCEDURE — 99497 ADVNCD CARE PLAN 30 MIN: CPT | Performed by: HOSPITALIST

## 2024-09-11 PROCEDURE — 99232 SBSQ HOSP IP/OBS MODERATE 35: CPT | Mod: 25 | Performed by: HOSPITALIST

## 2024-09-11 PROCEDURE — 80069 RENAL FUNCTION PANEL: CPT

## 2024-09-11 PROCEDURE — 83735 ASSAY OF MAGNESIUM: CPT

## 2024-09-11 PROCEDURE — 36415 COLL VENOUS BLD VENIPUNCTURE: CPT

## 2024-09-11 PROCEDURE — 94640 AIRWAY INHALATION TREATMENT: CPT

## 2024-09-11 PROCEDURE — 99221 1ST HOSP IP/OBS SF/LOW 40: CPT | Mod: 25 | Performed by: NURSE PRACTITIONER

## 2024-09-11 PROCEDURE — 99231 SBSQ HOSP IP/OBS SF/LOW 25: CPT | Performed by: PHYSICIAN ASSISTANT

## 2024-09-11 PROCEDURE — 770001 HCHG ROOM/CARE - MED/SURG/GYN PRIV*

## 2024-09-11 PROCEDURE — 85027 COMPLETE CBC AUTOMATED: CPT

## 2024-09-11 PROCEDURE — 700111 HCHG RX REV CODE 636 W/ 250 OVERRIDE (IP): Mod: JZ | Performed by: HOSPITALIST

## 2024-09-11 PROCEDURE — 99497 ADVNCD CARE PLAN 30 MIN: CPT | Performed by: NURSE PRACTITIONER

## 2024-09-11 PROCEDURE — 700111 HCHG RX REV CODE 636 W/ 250 OVERRIDE (IP): Performed by: HOSPITALIST

## 2024-09-11 RX ORDER — MAGNESIUM SULFATE HEPTAHYDRATE 40 MG/ML
2 INJECTION, SOLUTION INTRAVENOUS ONCE
Status: COMPLETED | OUTPATIENT
Start: 2024-09-11 | End: 2024-09-11

## 2024-09-11 RX ADMIN — MAGNESIUM SULFATE HEPTAHYDRATE 2 G: 2 INJECTION, SOLUTION INTRAVENOUS at 09:12

## 2024-09-11 RX ADMIN — PREDNISOLONE ACETATE 1 DROP: 10 SUSPENSION/ DROPS OPHTHALMIC at 05:40

## 2024-09-11 RX ADMIN — SENNOSIDES AND DOCUSATE SODIUM 2 TABLET: 50; 8.6 TABLET ORAL at 16:34

## 2024-09-11 RX ADMIN — KETOROLAC TROMETHAMINE 1 DROP: 0.5 SOLUTION OPHTHALMIC at 05:40

## 2024-09-11 RX ADMIN — BUDESONIDE 0.5 MG: 0.5 SUSPENSION RESPIRATORY (INHALATION) at 18:14

## 2024-09-11 RX ADMIN — RIVAROXABAN 10 MG: 10 TABLET, FILM COATED ORAL at 16:34

## 2024-09-11 RX ADMIN — BUDESONIDE 0.5 MG: 0.5 SUSPENSION RESPIRATORY (INHALATION) at 06:36

## 2024-09-11 ASSESSMENT — ENCOUNTER SYMPTOMS
PALPITATIONS: 0
HEADACHES: 0
SENSORY CHANGE: 0
BRUISES/BLEEDS EASILY: 0
SHORTNESS OF BREATH: 0
DIARRHEA: 0
BACK PAIN: 0
HEMOPTYSIS: 0
CLAUDICATION: 0
LOSS OF CONSCIOUSNESS: 0
FOCAL WEAKNESS: 0
CONSTIPATION: 0
MYALGIAS: 0
MYALGIAS: 1
FEVER: 0
ABDOMINAL PAIN: 0
WEAKNESS: 0
DIAPHORESIS: 0
WHEEZING: 0
NAUSEA: 0
VOMITING: 0
SHORTNESS OF BREATH: 1
COUGH: 0
EYE DISCHARGE: 0
SORE THROAT: 0
CHILLS: 0
DEPRESSION: 0
DIZZINESS: 0
SPUTUM PRODUCTION: 0
EYE PAIN: 0
NECK PAIN: 0
SPEECH CHANGE: 0

## 2024-09-11 ASSESSMENT — LIFESTYLE VARIABLES: SUBSTANCE_ABUSE: 0

## 2024-09-11 ASSESSMENT — PAIN DESCRIPTION - PAIN TYPE
TYPE: ACUTE PAIN

## 2024-09-11 NOTE — CARE PLAN
The patient is Watcher - Medium risk of patient condition declining or worsening    Shift Goals  Clinical Goals: ct mainteneance, oxygenation  Patient Goals: rest  Family Goals: Updates    Progress made toward(s) clinical / shift goals:  education provided on poc and safety to pt and family. Ct without air leak to right, -20 s uction. Ambulating, IS at bedside. Good effort.    Problem: Knowledge Deficit - Standard  Goal: Patient and family/care givers will demonstrate understanding of plan of care, disease process/condition, diagnostic tests and medications  Description: Target End Date:  1-3 days or as soon as patient condition allows    Document in Patient Education    1.  Patient and family/caregiver oriented to unit, equipment, visitation policy and means for communicating concern  2.  Complete/review Learning Assessment  3.  Assess knowledge level of disease process/condition, treatment plan, diagnostic tests and medications  4.  Explain disease process/condition, treatment plan, diagnostic tests and medications  Outcome: Progressing       Patient is not progressing towards the following goals:

## 2024-09-11 NOTE — HOSPITAL COURSE
This 84-year-old male with a past medical significant for COPD, prior history of smoking, reports quit drinking 2 years ago presented to the ER on 9/3 at [facility] after he was noted to be acutely short of breath, found to have right-sided pneumothorax.  He was transferred to [facility] after chest tube placement.    Patient denies any trauma likely spontaneous pneumothorax.  During the stay in the hospital, he continued to remain on 5 to 6 L of oxygen, he stated that he does have fatigue chest pain on the right side he is very hard of hearing.    Chest x-ray obtained on 9/10 showed 9 mm pneumothorax, chest tube was placed on waterseal.  Chest tube has been removed by surgery on 9/14, home O2 eval was obtained, patient be discharged home on home oxygen along with home health.    Plan of care has been discussed the patient and family in Estonian.   At this time, for all the other chronic medical condition; he will resume his home medication.      Interval events:  -- Patient is alert, awake, answering questions appropriately, currently requiring 4 to 5 L of oxygen  --Recommend 1.7, will provide 2 g of IV magnesium.  -- he to be frail and cachectic, provide boost 3 times daily with meals, nutrition consult.  Surgery continue to follow, patient does have a chest tube in the right side  -- PT and OT has added, recommend home health  -- continue iS  -- taper narcotic pain meds   -- palliative following     Discussed the CODE STATUS in Estonian, patient wanted to be full code

## 2024-09-11 NOTE — PROGRESS NOTES
Report received from donald RN  Pt AAOx4, Alert. Responds appropriately. Latvian speaking  5L oxymask, increased WOB with exertion.   IS at bedside  Right CT to -20 suction. Patent, no air leak noted  Denies pain  +void, +flatus, BM 9/10  Regular diet, tolerating well. No n/v  Ambulating to BR with steady gait. Standby with FWW  Calls appropriately for assistance  SCDs on      POC reviewed, education provided. Bed locked and in low position, pt instructed to call for assistance. Comprehension verbalized. Call light within reach, all needs met at this time.

## 2024-09-11 NOTE — CARE PLAN
The patient is Watcher - Medium risk of patient condition declining or worsening    Shift Goals  Clinical Goals: ct. maintenance, oxygenation, use IS 10x/ hr  Patient Goals: rest  Family Goals: updates      Problem: Pain - Standard  Goal: Alleviation of pain or a reduction in pain to the patient’s comfort goal  Description: Target End Date:  Prior to discharge or change in level of care    Document on Vitals flowsheet    1.  Document pain using the appropriate pain scale per order or unit policy  2.  Educate and implement non-pharmacologic comfort measures (i.e. relaxation, distraction, massage, cold/heat therapy, etc.)  3.  Pain management medications as ordered  4.  Reassess pain after pain med administration per policy  5.  If opiods administered assess patient's response to pain medication is appropriate per POSS sedation scale  6.  Follow pain management plan developed in collaboration with patient and interdisciplinary team (including palliative care or pain specialists if applicable)  Outcome: Progressing     Problem: Respiratory  Goal: Patient will achieve/maintain optimum respiratory ventilation and gas exchange  Description: Target End Date:  Prior to discharge or change in level of care    Document on Assessment flowsheet    1.  Assess and monitor rate, rhythm, depth and effort of respiration  2.  Breath sounds assessed qshift and/or as needed  3.  Assess O2 saturation, administer/titrate oxygen as ordered  4.  Position patient for maximum ventilatory efficiency  5.  Turn, cough, and deep breath with splinting to improve effectiveness  6.  Collaborate with RT to administer medication/treatments per order  7.  Encourage use of incentive spirometer and encourage patient to cough after use and utilize splinting techniques if applicable  8.  Airway suctioning  9.  Monitor sputum production for changes in color, consistency and frequency  10. Perform frequent oral hygiene  11. Alternate physical activity with  rest periods  Outcome: Progressing     Problem: Fall Risk  Goal: Patient will remain free from falls  Description: Target End Date:  Prior to discharge or change in level of care    Document interventions on the  [place] Fall Risk Assessment    1.  Assess for fall risk factors  2.  Implement fall precautions  Outcome: Progressing       Progress made toward(s) clinical / shift goals:  Pt utilized IS and scored 500 mL. Pt. Needs coaching while doing IS. Pt is agreeable to plan of care.     Patient is not progressing towards the following goals:

## 2024-09-11 NOTE — PROGRESS NOTES
Trauma / Surgical Daily Progress Note    Date of Service  9/11/2024    Chief Complaint  84 y.o. male admitted 9/3/2024 with spontaneous pneumothorax. Non-trauma.     9/3 Right tube thoracostomy  9/5 Thoracoscopic talc and abrasive pleurodesis     Interval Events  Chest tube placed back to suction yesterday.   CXR today with stable tiny 9mm right apical pneumothorax.   Poor IS. Requiring 4-5L of supplemental oxygen.     - Aggressive pulmonary hygiene.   - IS efforts and PEP.   - Continue chest tube to suction.     Review of Systems  Review of Systems   Constitutional:  Negative for chills, fever and malaise/fatigue.   Respiratory:  Positive for shortness of breath. Negative for cough.    Cardiovascular:  Negative for chest pain.   Gastrointestinal:  Negative for nausea and vomiting.   Musculoskeletal:  Positive for myalgias. Negative for back pain and neck pain.        Vital Signs  Temp:  [36.3 °C (97.3 °F)-37.1 °C (98.8 °F)] 36.7 °C (98.1 °F)  Pulse:  [74-94] 74  Resp:  [16-18] 16  BP: (111-124)/(54-68) 121/61  SpO2:  [92 %-99 %] 94 %    Physical Exam  Physical Exam  Vitals and nursing note reviewed.   Constitutional:       General: He is awake. He is not in acute distress.     Appearance: He is not ill-appearing, toxic-appearing or diaphoretic.      Comments: Elderly, frail and deconditioned   Pulmonary:      Effort: Pulmonary effort is normal. No respiratory distress.      Comments: Chest tube to suction, no air leak.   Chest:      Chest wall: Tenderness present.   Abdominal:      General: There is no distension.      Tenderness: There is no abdominal tenderness. There is no guarding or rebound.   Musculoskeletal:      Comments: Moves all extremities   Skin:     General: Skin is warm and dry.      Capillary Refill: Capillary refill takes less than 2 seconds.   Neurological:      Mental Status: He is alert.      GCS: GCS eye subscore is 4. GCS verbal subscore is 5. GCS motor subscore is 6.   Psychiatric:          Behavior: Behavior is cooperative.         Laboratory  Recent Results (from the past 24 hour(s))   Basic Metabolic Panel    Collection Time: 09/11/24  6:24 AM   Result Value Ref Range    Sodium 136 135 - 145 mmol/L    Potassium 4.1 3.6 - 5.5 mmol/L    Chloride 100 96 - 112 mmol/L    Co2 28 20 - 33 mmol/L    Glucose 102 (H) 65 - 99 mg/dL    Bun 12 8 - 22 mg/dL    Creatinine 0.41 (L) 0.50 - 1.40 mg/dL    Calcium 8.4 (L) 8.5 - 10.5 mg/dL    Anion Gap 8.0 7.0 - 16.0   CBC WITHOUT DIFFERENTIAL    Collection Time: 09/11/24  6:24 AM   Result Value Ref Range    WBC 7.0 4.8 - 10.8 K/uL    RBC 3.63 (L) 4.70 - 6.10 M/uL    Hemoglobin 11.8 (L) 14.0 - 18.0 g/dL    Hematocrit 35.7 (L) 42.0 - 52.0 %    MCV 98.3 (H) 81.4 - 97.8 fL    MCH 32.5 27.0 - 33.0 pg    MCHC 33.1 32.3 - 36.5 g/dL    RDW 49.0 35.9 - 50.0 fL    Platelet Count 279 164 - 446 K/uL    MPV 9.4 9.0 - 12.9 fL   Renal Function Panel    Collection Time: 09/11/24  6:24 AM   Result Value Ref Range    Correct Calcium 9.4 8.5 - 10.5 mg/dL    Phosphorus 3.2 2.5 - 4.5 mg/dL    Albumin 2.7 (L) 3.2 - 4.9 g/dL   MAGNESIUM    Collection Time: 09/11/24  6:24 AM   Result Value Ref Range    Magnesium 1.7 1.5 - 2.5 mg/dL   ESTIMATED GFR    Collection Time: 09/11/24  6:24 AM   Result Value Ref Range    GFR (CKD-EPI) 107 >60 mL/min/1.73 m 2       Fluids    Intake/Output Summary (Last 24 hours) at 9/11/2024 0945  Last data filed at 9/11/2024 0938  Gross per 24 hour   Intake --   Output 1075 ml   Net -1075 ml         Assessment/Plan  * Primary spontaneous pneumothorax- (present on admission)  Assessment & Plan  Large spontaneous pneumothorax   Attempted decompression in ED.  Chest tube placed in ED. (+) air leak noted/no drainage. Placed to suction.  9/4 CXR with no pneumothorax.  CT to suction with no air leak.  Continue chest tube to suction.    9/5 OR for thoracoscopic talc and abrasive pleurodesis. Bleb resection not indicated.   9/9 Chest tube to water seal. No pneumothorax on  morning CXR.  9/10 Small 9mm pneumothorax.  -Place chest tube to water seal.   -PEP therapy.  Daily chest xray's.    Trauma- (present on admission)  Assessment & Plan  Denies trauma      Discussed patient condition with RN, Patient, and trauma surgery. Demetrius Mccray MD

## 2024-09-11 NOTE — PROGRESS NOTES
Hospital Medicine Daily Progress Note    Date of Service  9/11/2024    Chief Complaint  Joaquín Hartman is a 84 y.o. male admitted 9/3/2024 with spontaneous right pneumothorax.    Hospital Course  This 84-year-old male with a past medical significant for COPD, prior history of smoking, reports quit drinking 2 years ago presented to the ER on 9/3 at [facility] after he was noted to be acutely short of breath, found to have right-sided pneumothorax.  He was transferred to [facility] after chest tube placement.    Patient denies any trauma likely spontaneous pneumothorax.  During the stay in the hospital, he continued to remain on 5 to 6 L of oxygen, he stated that he does have fatigue chest pain on the right side he is very hard of hearing.    Chest x-ray obtained on 9/10 showed 9 mm pneumothorax, chest tube was placed on waterseal.  Patient is cachectic, nutrition consult has been placed, will provide Ensure    Interval events:  -- Patient is alert, awake, answering questions appropriately, currently requiring 4 to 5 L of oxygen  --Recommend 1.7, will provide 2 g of IV magnesium.  -- he to be frail and cachectic, provide boost 3 times daily with meals, nutrition consult.  Surgery continue to follow, patient does have a chest tube in the right side  -- PT and OT has added, recommend home health  -- continue iS  -- taper narcotic pain meds   -- palliative following     Discussed the CODE STATUS in Vietnamese, patient wanted to be full code are has been discussed with the surgery      I have discussed this patient's plan of care and discharge plan at IDT rounds today with Case Management, Nursing, Nursing leadership, and other members of the IDT team.    Consultants/Specialty  general surgery    Code Status  Full Code    Disposition  The patient is not medically cleared for discharge to home or a post-acute facility.      I have placed the appropriate orders for post-discharge needs.    Review of Systems  Review of Systems    Constitutional:  Negative for chills, diaphoresis, fever and malaise/fatigue.   HENT:  Negative for congestion and sore throat.    Eyes:  Negative for pain and discharge.   Respiratory:  Negative for cough, hemoptysis, sputum production, shortness of breath and wheezing.    Cardiovascular:  Positive for chest pain. Negative for palpitations, claudication and leg swelling.   Gastrointestinal:  Negative for abdominal pain, constipation, diarrhea, melena, nausea and vomiting.   Genitourinary:  Negative for dysuria, frequency and urgency.   Musculoskeletal:  Negative for back pain, joint pain, myalgias and neck pain.   Skin:  Negative for itching and rash.   Neurological:  Negative for dizziness, sensory change, speech change, focal weakness, loss of consciousness, weakness and headaches.   Endo/Heme/Allergies:  Does not bruise/bleed easily.   Psychiatric/Behavioral:  Negative for depression, substance abuse and suicidal ideas.         Physical Exam  Temp:  [36.3 °C (97.3 °F)-37.1 °C (98.8 °F)] 36.6 °C (97.9 °F)  Pulse:  [74-94] 74  Resp:  [16] 16  BP: (103-121)/(49-61) 103/49  SpO2:  [92 %-99 %] 94 %    Physical Exam  Constitutional:       General: He is not in acute distress.     Appearance: He is not diaphoretic.      Comments: Frail, rib protuberance.   HENT:      Head: Normocephalic and atraumatic.      Mouth/Throat:      Mouth: Mucous membranes are dry.      Pharynx: No oropharyngeal exudate.   Eyes:      General: No scleral icterus.        Right eye: No discharge.         Left eye: No discharge.      Conjunctiva/sclera: Conjunctivae normal.      Pupils: Pupils are equal, round, and reactive to light.   Neck:      Thyroid: No thyromegaly.      Vascular: No JVD.      Trachea: No tracheal deviation.   Cardiovascular:      Rate and Rhythm: Normal rate and regular rhythm.      Heart sounds: Normal heart sounds. No murmur heard.     No friction rub. No gallop.   Pulmonary:      Effort: Pulmonary effort is normal. No  respiratory distress.      Breath sounds: Normal breath sounds. No wheezing or rales.      Comments: Right chest tube to suction.  Chest:      Chest wall: No tenderness.   Abdominal:      General: Bowel sounds are normal. There is no distension.      Palpations: Abdomen is soft. There is no mass.      Tenderness: There is no abdominal tenderness. There is no guarding or rebound.   Musculoskeletal:         General: No tenderness. Normal range of motion.      Cervical back: Normal range of motion and neck supple.   Lymphadenopathy:      Cervical: No cervical adenopathy.   Skin:     General: Skin is warm and dry.      Capillary Refill: Capillary refill takes less than 2 seconds.      Findings: No erythema or rash.   Neurological:      General: No focal deficit present.      Mental Status: He is alert and oriented to person, place, and time.      Cranial Nerves: No cranial nerve deficit.      Motor: No abnormal muscle tone.   Psychiatric:         Mood and Affect: Mood normal.         Behavior: Behavior normal.         Thought Content: Thought content normal.         Judgment: Judgment normal.         Fluids    Intake/Output Summary (Last 24 hours) at 9/11/2024 1304  Last data filed at 9/11/2024 1151  Gross per 24 hour   Intake --   Output 1525 ml   Net -1525 ml        Laboratory  Recent Labs     09/11/24  0624   WBC 7.0   RBC 3.63*   HEMOGLOBIN 11.8*   HEMATOCRIT 35.7*   MCV 98.3*   MCH 32.5   MCHC 33.1   RDW 49.0   PLATELETCT 279   MPV 9.4       Recent Labs     09/09/24  0644 09/10/24  0138 09/11/24  0624   SODIUM 132* 134* 136   POTASSIUM 4.1 4.3 4.1   CHLORIDE 97 96 100   CO2 28 31 28   GLUCOSE 97 101* 102*   BUN 12 12 12   CREATININE 0.27* 0.35* 0.41*   CALCIUM 8.2* 8.2* 8.4*                     Imaging  DX-CHEST-PORTABLE (1 VIEW)   Final Result         1. Tiny, 9 mm right apical pneumothorax.   2. Small amount of right pleural fluid and right basilar airspace disease again noted.   3. Severe emphysema.             [id number] VIEW   Final Result      No evidence of bowel obstruction.                  DX-CHEST-PORTABLE (1 VIEW)   Final Result         1. Tiny, 9 mm right apical pneumothorax.   2. Small amount of right pleural fluid and right basilar airspace disease again noted.   3. Severe emphysema.         DX-CHEST-PORTABLE (1 VIEW)   Final Result         1. No pneumothorax.   2. Small amount of right pleural fluid and right basilar airspace disease again noted.   3. Severe emphysema.      DX-CHEST-PORTABLE (1 VIEW)   Final Result         1. Tiny right apical pneumothorax.   2. Small amount of right pleural fluid and right basilar airspace disease and noted.   3. Severe emphysema.      DX-CHEST-PORTABLE (1 VIEW)   Final Result         1.  Interstitial pulmonary parenchymal prominence suggest chronic underlying lung disease, component of interstitial edema and/or infiltrates not excluded.   2.  Trace right apical pneumothorax, thoracostomy tube in place.   3.  Trace bilateral pleural effusions, stable since prior study.   4.  Atherosclerosis      DX-CHEST-PORTABLE (1 VIEW)   Final Result         1.  Interstitial pulmonary parenchymal prominence suggest chronic underlying lung disease, component of interstitial edema and/or infiltrates not excluded.   2.  Trace bilateral pleural effusions, new since prior study.   3.  Atherosclerosis      DX-CHEST-PORTABLE (1 VIEW)   Final Result         1.  Interstitial pulmonary parenchymal prominence suggest chronic underlying lung disease, component of interstitial edema and/or infiltrates not excluded.      DX-CHEST-PORTABLE (1 VIEW)   Final Result      1.  Presents right chest tube      2.  Tiny right pneumothorax      CT-CHEST (THORAX) WITH   Final Result         1. Interval placement of right chest tube with small residual right pneumothorax.      2. Emphysematous changes with large bullae in the right middle lobe.   3. Patchy right basilar atelectasis.         DX-CHEST-PORTABLE (1  VIEW)   Final Result         No appreciable pneumothorax after right chest tube placement.      DX-CHEST-PORTABLE (1 VIEW)   Final Result      Stable large right pneumothorax.      DX-CHEST-PORTABLE (1 VIEW)   Final Result         Large right pneumothorax with collapsing of the right lung.            CRITICAL RESULT READ BACK: Preliminary findings discussed with and critical read back performed by Dr. VALERIA BOLANOS in the Emergency Department via telephone on 9/3/2024 12:08 PM           Assessment/Plan  * Primary spontaneous pneumothorax- (present on admission)  Assessment & Plan  Spontaneous likely due to bleb from emphysema and prior years of smoking.  S/P chest tube after initial catheter at OSH failed.  Monitor pleurovac + air leak, chest tube placed to suction.  Supplemental oxygen to keep SpO2 >89%  RT per protocol   Surgery  following   Daily chest x-ray    Protein-calorie malnutrition, severe (HCC)  Assessment & Plan  Dietary following  Boost tid    Advance care planning  Assessment & Plan  Patient was of sound mind and voluntarily participated in the conversation.  Patient and nursing staff  were present for the conversation.  I discussed advance care planning face to face with the patient for at least 18 minutes, including diagnosis, prognosis, plan of care, risks and benefits of any therapies that could be offered, as well as alternatives including palliation      Patient opted for full code     Dehydration- (present on admission)  Assessment & Plan  Clinically dry mucus membranes, poor skin turgor.  Improving   Encourage good po intake      Hyperglycemia  Assessment & Plan  Monitor labs    Elevated alkaline phosphatase level  Assessment & Plan  Alk Phos:178  Needs outpatient work up.  No current abdominal pain.    Hypokalemia  Assessment & Plan  Replete and monitor    COPD (chronic obstructive pulmonary disease) (HCC)- (present on admission)  Assessment & Plan  No exacerbation  RT per protocol  Monitor  SpO2 and titrate oxygen to keep >89%  Nebulizer and inhalers    Leukocytosis  Assessment & Plan  Resolved   Cbc daily         VTE prophylaxis: xarelto    I have performed a physical exam and reviewed and updated ROS and Plan today (9/11/2024). In review of yesterday's note (9/10/2024), there are no changes except as documented above.

## 2024-09-11 NOTE — PROGRESS NOTES
Hospital Medicine Daily Progress Note    Date of Service  9/10/2024    Chief Complaint  Joaquín Hartman is a 84 y.o. male admitted 9/3/2024 with spontaneous right pneumothorax.    Hospital Course  Joaquín Hartman is a Greenlandic speaking 84 y.o. male w/ a hx of COPD and prior smoker who presented 9/3/2024 with acute shortness of breath to a medical facility in [facility].  He was found to have a right sided large pneumothorax and medically air flown to [facility] after chest intracatheter placement.   Here he received an emergent chest tube with return of right sided lung appearance on follow up xray.  He denies recent trauma.   Likely spontaneous pneumothorax is from a pulmonary emphysematous bleb that ruptured.    Interval Problem Update    9/9:  CXR no further pneumothorax seen, chest tube to water seal, increase oxygen demand to 6 LPM NC.  I have added Spiriva and Pulmicort inhalers.  Encourage incentive spirometer.  Palliative care consult given mildly worsening oxygenation and difficulty weaning from chest tube.  Laxatives ordered.  I did taper oxycodone to Norco as needed to help him stay more awake and participate with incentive spirometry.    9/10:  Alert and awake   On O2 at 5-6 L   Chest x-ray showing 9 mm apical pneumo  KUB  reviewed   Continue inhaler,  Provided incentive spirometer  RT protocol, breathing treatment    Surgery following, plan of care has been discussed with the surgery      I have discussed this patient's plan of care and discharge plan at IDT rounds today with Case Management, Nursing, Nursing leadership, and other members of the IDT team.    Consultants/Specialty  general surgery    Code Status  Full Code    Disposition  Medically Cleared  I have placed the appropriate orders for post-discharge needs.    Review of Systems  Review of Systems   Constitutional:  Negative for chills, diaphoresis, fever and malaise/fatigue.   HENT:  Negative for congestion and sore throat.    Eyes:  Negative for pain  and discharge.   Respiratory:  Negative for cough, hemoptysis, sputum production, shortness of breath and wheezing.    Cardiovascular:  Positive for chest pain. Negative for palpitations, claudication and leg swelling.   Gastrointestinal:  Negative for abdominal pain, constipation, diarrhea, melena, nausea and vomiting.   Genitourinary:  Negative for dysuria, frequency and urgency.   Musculoskeletal:  Negative for back pain, joint pain, myalgias and neck pain.   Skin:  Negative for itching and rash.   Neurological:  Negative for dizziness, sensory change, speech change, focal weakness, loss of consciousness, weakness and headaches.   Endo/Heme/Allergies:  Does not bruise/bleed easily.   Psychiatric/Behavioral:  Negative for depression, substance abuse and suicidal ideas.         Physical Exam  Temp:  [36.3 °C (97.3 °F)-37.3 °C (99.1 °F)] 37 °C (98.6 °F)  Pulse:  [] 78  Resp:  [16-18] 16  BP: (111-149)/(54-80) 115/59  SpO2:  [92 %-98 %] 98 %    Physical Exam  Constitutional:       General: He is not in acute distress.     Appearance: He is not diaphoretic.      Comments: Frail, rib protuberance.   HENT:      Head: Normocephalic and atraumatic.      Mouth/Throat:      Mouth: Mucous membranes are dry.      Pharynx: No oropharyngeal exudate.   Eyes:      General: No scleral icterus.        Right eye: No discharge.         Left eye: No discharge.      Conjunctiva/sclera: Conjunctivae normal.      Pupils: Pupils are equal, round, and reactive to light.   Neck:      Thyroid: No thyromegaly.      Vascular: No JVD.      Trachea: No tracheal deviation.   Cardiovascular:      Rate and Rhythm: Normal rate and regular rhythm.      Heart sounds: Normal heart sounds. No murmur heard.     No friction rub. No gallop.   Pulmonary:      Effort: Pulmonary effort is normal. No respiratory distress.      Breath sounds: Normal breath sounds. No wheezing or rales.      Comments: Right chest tube to suction.  Chest:      Chest wall:  No tenderness.   Abdominal:      General: Bowel sounds are normal. There is no distension.      Palpations: Abdomen is soft. There is no mass.      Tenderness: There is no abdominal tenderness. There is no guarding or rebound.   Musculoskeletal:         General: No tenderness. Normal range of motion.      Cervical back: Normal range of motion and neck supple.   Lymphadenopathy:      Cervical: No cervical adenopathy.   Skin:     General: Skin is warm and dry.      Capillary Refill: Capillary refill takes less than 2 seconds.      Findings: No erythema or rash.   Neurological:      General: No focal deficit present.      Mental Status: He is alert and oriented to person, place, and time.      Cranial Nerves: No cranial nerve deficit.      Motor: No abnormal muscle tone.   Psychiatric:         Mood and Affect: Mood normal.         Behavior: Behavior normal.         Thought Content: Thought content normal.         Judgment: Judgment normal.         Fluids    Intake/Output Summary (Last 24 hours) at 9/10/2024 2113  Last data filed at 9/10/2024 1933  Gross per 24 hour   Intake 240 ml   Output 1311 ml   Net -1071 ml        Laboratory        Recent Labs     09/08/24  0348 09/09/24  0644 09/10/24  0138   SODIUM 135 132* 134*   POTASSIUM 4.0 4.1 4.3   CHLORIDE 99 97 96   CO2 27 28 31   GLUCOSE 104* 97 101*   BUN 16 12 12   CREATININE 0.39* 0.27* 0.35*   CALCIUM 8.5 8.2* 8.2*                     Imaging  [id number] VIEW   Final Result      No evidence of bowel obstruction.                  DX-CHEST-PORTABLE (1 VIEW)   Final Result         1. Tiny, 9 mm right apical pneumothorax.   2. Small amount of right pleural fluid and right basilar airspace disease again noted.   3. Severe emphysema.         DX-CHEST-PORTABLE (1 VIEW)   Final Result         1. No pneumothorax.   2. Small amount of right pleural fluid and right basilar airspace disease again noted.   3. Severe emphysema.      DX-CHEST-PORTABLE (1 VIEW)   Final Result          1. Tiny right apical pneumothorax.   2. Small amount of right pleural fluid and right basilar airspace disease and noted.   3. Severe emphysema.      DX-CHEST-PORTABLE (1 VIEW)   Final Result         1.  Interstitial pulmonary parenchymal prominence suggest chronic underlying lung disease, component of interstitial edema and/or infiltrates not excluded.   2.  Trace right apical pneumothorax, thoracostomy tube in place.   3.  Trace bilateral pleural effusions, stable since prior study.   4.  Atherosclerosis      DX-CHEST-PORTABLE (1 VIEW)   Final Result         1.  Interstitial pulmonary parenchymal prominence suggest chronic underlying lung disease, component of interstitial edema and/or infiltrates not excluded.   2.  Trace bilateral pleural effusions, new since prior study.   3.  Atherosclerosis      DX-CHEST-PORTABLE (1 VIEW)   Final Result         1.  Interstitial pulmonary parenchymal prominence suggest chronic underlying lung disease, component of interstitial edema and/or infiltrates not excluded.      DX-CHEST-PORTABLE (1 VIEW)   Final Result      1.  Presents right chest tube      2.  Tiny right pneumothorax      CT-CHEST (THORAX) WITH   Final Result         1. Interval placement of right chest tube with small residual right pneumothorax.      2. Emphysematous changes with large bullae in the right middle lobe.   3. Patchy right basilar atelectasis.         DX-CHEST-PORTABLE (1 VIEW)   Final Result         No appreciable pneumothorax after right chest tube placement.      DX-CHEST-PORTABLE (1 VIEW)   Final Result      Stable large right pneumothorax.      DX-CHEST-PORTABLE (1 VIEW)   Final Result         Large right pneumothorax with collapsing of the right lung.            CRITICAL RESULT READ BACK: Preliminary findings discussed with and critical read back performed by Dr. VALERIA BOLANOS in the Emergency Department via telephone on 9/3/2024 12:08 PM      DX-CHEST-PORTABLE (1 VIEW)    (Results Pending)         Assessment/Plan  * Primary spontaneous pneumothorax- (present on admission)  Assessment & Plan  Spontaneous likely due to bleb from emphysema and prior years of smoking.  S/P chest tube after initial catheter at OSH failed.  Monitor pleurovac + air leak, chest tube placed to suction.  Supplemental oxygen to keep SpO2 >89%  RT per protocol   Surgery  following   Daily chest x-ray    Dehydration- (present on admission)  Assessment & Plan  Clinically dry mucus membranes, poor skin turgor.  Improving   Encourage good po intake      Hyperglycemia  Assessment & Plan  Monitor labs    Elevated alkaline phosphatase level  Assessment & Plan  Alk Phos:178  Needs outpatient work up.  No current abdominal pain.    Hypokalemia  Assessment & Plan  Replete and monitor    COPD (chronic obstructive pulmonary disease) (HCC)- (present on admission)  Assessment & Plan  No exacerbation  RT per protocol  Monitor SpO2 and titrate oxygen to keep >89%  Nebulizer and inhalers    Leukocytosis  Assessment & Plan  Likely leukemoid reaction  Monitor am CBC and vitals.  Hold antibiotics for now.         VTE prophylaxis: xarelto

## 2024-09-11 NOTE — ASSESSMENT & PLAN NOTE
Patient was of sound mind and voluntarily participated in the conversation.  Patient and nursing staff  were present for the conversation.  I discussed advance care planning face to face with the patient for at least 18 minutes, including diagnosis, prognosis, plan of care, risks and benefits of any therapies that could be offered, as well as alternatives including palliation      Patient opted for full code

## 2024-09-11 NOTE — CONSULTS
"GOALS OF CARE BRIEF/SUMMARY utilizing , discussed CODE STATUS with patient.  Patient prefers not to be resuscitated and thus agrees to DNR/DNI.  POLST completed and given to nurse.  Copy to be scanned into epic.  PC to sign off..      MRN: [id number]  Date of palliative consult: 2024  Reason for consult: Complex advance care planning/goals of care  Referring provider: Pepper  Location of consult: General Surgery T4 16  Additional consulting services: Hospital medicine, PMNR, general surgery    HPI:   Joaquín Hartman is a 84 y.o. male with past medical history of COPD and prior smoking who presented on 9/3/2024 with acute shortness of breath to an outside facility in [facility].  Patient was found to have right-sided large pneumothorax and medically air flown to [facility].  Upon arrival he received emergent chest tube with return of right-sided lung appearance follow-up on x-ray.  Patient denies recent trauma.    Significant/pertinent past medical history: Former smoker quit \"a few years ago\".      Interval History:    CXR with no pneumothorax.  CT to suction with no air leak.  Continue chest tube to suction.     OR for thoracoscopic talc and abrasive pleurodesis. Bleb resection not indicated.    Chest tube to water seal. No pneumothorax on morning CXR.  9/10 Small 9mm pneumothorax.  -Place chest tube to water seal.   -PEP therapy.  Daily chest xray's.  (Ana APRN)    Medication Allergy/Sensitivities:  Not on File    ROS:    Review of Systems   Unable to perform ROS: Language       PE:   Recent vital signs  BMI: Body mass index is 17.22 kg/m².    Temp (24hrs), Av.8 °C (98.2 °F), Min:36.3 °C (97.3 °F), Max:37.1 °C (98.8 °F)  Temperature: 36.6 °C (97.9 °F)  Pulse  Av.6  Min: 62  Max: 100   Blood Pressure : 103/49       Physical Exam  Vitals and nursing note reviewed.   Constitutional:       General: He is not in acute distress.     Appearance: He is cachectic. He is " ill-appearing.      Interventions: Nasal cannula in place.   HENT:      Head:      Comments: Significant bitemporal and cheek wasting.  Pulmonary:      Effort: Pulmonary effort is normal.      Comments: Chest tube right side.  Ribs and clavicles evident.  Musculoskeletal:      Comments: Generalized severe sarcopenia.   Skin:     General: Skin is warm and dry.      Capillary Refill: Capillary refill takes 2 to 3 seconds.      Coloration: Skin is pale.   Neurological:      General: No focal deficit present.      Mental Status: He is alert and oriented to person, place, and time.   Psychiatric:         Attention and Perception: Attention normal.         Mood and Affect: Mood normal.         Speech: Speech normal.         Behavior: Behavior normal. Behavior is cooperative.         Cognition and Memory: Cognition normal.         Judgment: Judgment normal.       Recent Labs     09/09/24  0644 09/10/24  0138 09/11/24  0624   SODIUM 132* 134* 136   POTASSIUM 4.1 4.3 4.1   CHLORIDE 97 96 100   CO2 28 31 28   GLUCOSE 97 101* 102*   BUN 12 12 12   CREATININE 0.27* 0.35* 0.41*   CALCIUM 8.2* 8.2* 8.4*     Recent Labs     09/11/24  0624   WBC 7.0   RBC 3.63*   HEMOGLOBIN 11.8*   HEMATOCRIT 35.7*   MCV 98.3*   MCH 32.5   MCHC 33.1   RDW 49.0   PLATELETCT 279   MPV 9.4       ASSESSMENT/PLAN WITH SHARED DECISION MAKING:   Review  Pertinent imaging reviewed.    PHYSICAL ASPECTS OF CARE  Palliative Performance Scale: 40%    # Primary spontaneous pneumothorax likely secondary to bleb from emphysema  # Dehydration  # Frailty  # COPD  # Protein calorie malnutrition    SOCIAL ASPECTS OF CARE  Patient lives in [facility] and was here visiting his daughter in [place] when he got sick.  He is  and has 7 children.  He is Azeri-speaking only.    SPIRITUAL ASPECTS OF CARE   Patient is Worship declines  visit at this time.    GOALS OF CARE/brief:  Met with patient with  Malgorzata at bedside.  Discussed CODE STATUS  specifically with regards to full code versus DNR.  Patient states that he would prefer to be a DNR.  POLST completed PC to sign off.     Code Status: DNR/DNI    ACP Documents: POLST completed and to be scanned in epic    20 minutes spent discussing advance care planning, this time excludes any other billed services.    Interval diagnostic studies and medical documentation entries pertinent to this case were reviewed independently by me. This patient has at least one acute or chronic illness or injury that poses a threat to life or bodily function. This patient suffers from a high risk of morbidly from additional invasive diagnostic testing or intensive treatment. Discussion of recommendations and coordination of care undertaken with primary provider/treatment team.        Janet Dickson, MSN, APRN, ACNPC-AG.  Palliative Care Nurse Practitioner  [phone]

## 2024-09-11 NOTE — PROGRESS NOTES
Received report from previous shift RN  Assessment complete.  A&O x 4 . Patient responds appropriately. Mongolian speaking.  Patient ambulates with x1 assist.   Patient has 0 /10 pain. Pain managed with rest and repositioning  Denies N&V. Tolerating regular diet.   + void, + flatus, BM 9/10  Patient denies SOB. Spo2>95% on 5L oxymask  IS at bedside  Right CT to -20 suction. Patent, no air leak noted    Patient denies of pain and n/v. Pt. Is agreeable to plan of care.   Reviewed plan of care with patient. Call light and personal belongings with in reach. Hourly rounding in place. All needs met at this time.

## 2024-09-11 NOTE — DIETARY
"Nutrition services: Day 8 of admit.  Joaquín Hartman is an 84 y.o. male with admitting DX of pneumothorax..     Consult received for \"cachectic\".  RD visited pt at bedside 9/9.  Pt was diagnosed with severe, chronic malnutrition.   Supplement order in place. Pt will continue to receive with meals to optimize nutrition and promote PO intake.    Assessment:  Height: 168 cm (5' 6.14\")  Weight: 48.6 kg (107 lb 2.3 oz)  Body mass index is 17.22 kg/m²., BMI classification: underweight  Diet/Intake: Regular; PO % of meals, appears appropriate given pt's small body frame and wt    Problem: Nutritional:  Goal: Achieve adequate nutritional intake  Description: Patient will consume 50% of meals  Outcome: progressing    Malnutrition risk: Pt with severe, chronic malnutrition, likely related to hypermetabolic disease state of COPD, AEB physical markers for severe fat and severe muscle wasting.     Recommendations/Plan:  Encourage intake of meals.  Document intake of all meals as % taken in ADLs to provide interdisciplinary communication across all shifts.   Monitor weight.  Nutrition rep will continue to see patient for ongoing meal and snack preferences.     RD will continue to follow.    "

## 2024-09-12 ENCOUNTER — APPOINTMENT (OUTPATIENT)
Dept: RADIOLOGY | Facility: MEDICAL CENTER | Age: 84
DRG: 166 | End: 2024-09-12
Attending: HOSPITALIST
Payer: MEDICARE

## 2024-09-12 PROBLEM — E43 PROTEIN-CALORIE MALNUTRITION, SEVERE (HCC): Status: ACTIVE | Noted: 2024-09-12

## 2024-09-12 LAB
ANION GAP SERPL CALC-SCNC: 9 MMOL/L (ref 7–16)
BUN SERPL-MCNC: 13 MG/DL (ref 8–22)
CALCIUM SERPL-MCNC: 8.4 MG/DL (ref 8.5–10.5)
CHLORIDE SERPL-SCNC: 100 MMOL/L (ref 96–112)
CO2 SERPL-SCNC: 27 MMOL/L (ref 20–33)
CREAT SERPL-MCNC: 0.35 MG/DL (ref 0.5–1.4)
GFR SERPLBLD CREATININE-BSD FMLA CKD-EPI: 112 ML/MIN/1.73 M 2
GLUCOSE SERPL-MCNC: 104 MG/DL (ref 65–99)
POTASSIUM SERPL-SCNC: 4.3 MMOL/L (ref 3.6–5.5)
SODIUM SERPL-SCNC: 136 MMOL/L (ref 135–145)

## 2024-09-12 PROCEDURE — 700102 HCHG RX REV CODE 250 W/ 637 OVERRIDE(OP): Performed by: HOSPITALIST

## 2024-09-12 PROCEDURE — 99232 SBSQ HOSP IP/OBS MODERATE 35: CPT | Performed by: PHYSICIAN ASSISTANT

## 2024-09-12 PROCEDURE — 770001 HCHG ROOM/CARE - MED/SURG/GYN PRIV*

## 2024-09-12 PROCEDURE — 71045 X-RAY EXAM CHEST 1 VIEW: CPT

## 2024-09-12 PROCEDURE — 80048 BASIC METABOLIC PNL TOTAL CA: CPT

## 2024-09-12 PROCEDURE — 94669 MECHANICAL CHEST WALL OSCILL: CPT

## 2024-09-12 PROCEDURE — A9270 NON-COVERED ITEM OR SERVICE: HCPCS | Performed by: HOSPITALIST

## 2024-09-12 PROCEDURE — 700111 HCHG RX REV CODE 636 W/ 250 OVERRIDE (IP): Performed by: HOSPITALIST

## 2024-09-12 PROCEDURE — 36415 COLL VENOUS BLD VENIPUNCTURE: CPT

## 2024-09-12 PROCEDURE — 99232 SBSQ HOSP IP/OBS MODERATE 35: CPT | Performed by: HOSPITALIST

## 2024-09-12 PROCEDURE — 94640 AIRWAY INHALATION TREATMENT: CPT

## 2024-09-12 RX ADMIN — BUDESONIDE 0.5 MG: 0.5 SUSPENSION RESPIRATORY (INHALATION) at 06:39

## 2024-09-12 RX ADMIN — RIVAROXABAN 10 MG: 10 TABLET, FILM COATED ORAL at 16:39

## 2024-09-12 RX ADMIN — KETOROLAC TROMETHAMINE 1 DROP: 0.5 SOLUTION OPHTHALMIC at 05:49

## 2024-09-12 RX ADMIN — PREDNISOLONE ACETATE 1 DROP: 10 SUSPENSION/ DROPS OPHTHALMIC at 05:49

## 2024-09-12 RX ADMIN — BUDESONIDE 0.5 MG: 0.5 SUSPENSION RESPIRATORY (INHALATION) at 18:56

## 2024-09-12 ASSESSMENT — ENCOUNTER SYMPTOMS
DIZZINESS: 0
EYE PAIN: 0
VOMITING: 0
CLAUDICATION: 0
SORE THROAT: 0
BRUISES/BLEEDS EASILY: 0
ABDOMINAL PAIN: 0
PALPITATIONS: 0
CONSTIPATION: 0
SENSORY CHANGE: 0
HEMOPTYSIS: 0
COUGH: 0
MYALGIAS: 1
SPUTUM PRODUCTION: 0
FEVER: 0
MYALGIAS: 0
DIARRHEA: 0
FOCAL WEAKNESS: 0
CHILLS: 0
DEPRESSION: 0
HEADACHES: 0
EYE DISCHARGE: 0
WHEEZING: 0
NAUSEA: 0
WEAKNESS: 0
DIAPHORESIS: 0
SHORTNESS OF BREATH: 0
LOSS OF CONSCIOUSNESS: 0
SPEECH CHANGE: 0
BACK PAIN: 0
NECK PAIN: 0

## 2024-09-12 ASSESSMENT — PAIN DESCRIPTION - PAIN TYPE: TYPE: ACUTE PAIN

## 2024-09-12 ASSESSMENT — LIFESTYLE VARIABLES: SUBSTANCE_ABUSE: 0

## 2024-09-12 ASSESSMENT — FIBROSIS 4 INDEX: FIB4 SCORE: 1.67

## 2024-09-12 NOTE — PROGRESS NOTES
Report received from donald RN  Pt AAOx4, Alert. Responds appropriately. Marshallese speaking  5L NC, increased WOB with exertion.   IS at bedside  Right CT to -20 suction. Patent, no air leak noted  Denies pain  +void, +flatus, BM 9/11  Regular diet, tolerating well. No n/v  Ambulating to BR with steady gait. Standby with FWW  Bed alarm on  Calls appropriately for assistance  SCDs on      POC reviewed, education provided. Bed locked and in low position, pt instructed to call for assistance. Comprehension verbalized. Call light within reach, all needs met at this time.

## 2024-09-12 NOTE — PROGRESS NOTES
Received report from previous shift RN  Assessment complete.  A&O x 4 . Patient calls and responds appropriately.  Patient ambulates with FWW and x1 assist. Bed alarm on.  Patient has 0 /10 pain. Pain managed with prescribed medications.  Denies N&V. Tolerating regular diet.  Right CT to -20 suction. Patent, no air leak noted.    + void, + flatus, + BM.  Patient denies SOB. Spo2>95% on 2 L O2   SCD's on.  Patient is utilizing the IS with the assistance of RN. Pt is scoring 500 mL.  Pt ambulated around the nurse's station around the unit with FWW and x1 assist. Pt's Code status has changed from DNR to Full Code. Patient is agreeable to plan of care.   Reviewed plan of care with patient. Call light and personal belongings with in reach. Hourly rounding in place. All needs met at this time.

## 2024-09-12 NOTE — PROGRESS NOTES
Trauma / Surgical Daily Progress Note    Date of Service  9/12/2024    Chief Complaint  84 y.o. male admitted 9/3/2024 with spontaneous pneumothorax. Non-trauma.     9/3 Right tube thoracostomy  9/5 Thoracoscopic talc and abrasive pleurodesis     Interval Events  CXR with stable tiny right pneumothorax.   IS ~500.     - Chest tube to water seal.   - AM CXR.   - Aggressive pulmonary hygiene.   - IS efforts and PEP.     Review of Systems  Review of Systems   Constitutional:  Negative for chills, fever and malaise/fatigue.   Respiratory:  Negative for cough and shortness of breath.    Cardiovascular:  Negative for chest pain.   Gastrointestinal:  Negative for nausea and vomiting.   Musculoskeletal:  Positive for myalgias. Negative for back pain and neck pain.        Vital Signs  Temp:  [36.5 °C (97.7 °F)-36.9 °C (98.4 °F)] 36.9 °C (98.4 °F)  Pulse:  [68-82] 81  Resp:  [16-18] 18  BP: ()/(56-79) 123/70  SpO2:  [91 %-96 %] 93 %    Physical Exam  Physical Exam  Vitals and nursing note reviewed.   Constitutional:       General: He is awake. He is not in acute distress.     Appearance: He is not ill-appearing, toxic-appearing or diaphoretic.      Comments: Elderly, frail and deconditioned   Pulmonary:      Effort: Pulmonary effort is normal. No respiratory distress.      Comments: Chest tube to suction, no air leak.   Chest:      Chest wall: Tenderness present.   Abdominal:      General: There is no distension.      Tenderness: There is no abdominal tenderness. There is no guarding or rebound.   Musculoskeletal:      Comments: Moves all extremities   Skin:     General: Skin is warm and dry.      Capillary Refill: Capillary refill takes less than 2 seconds.   Neurological:      Mental Status: He is alert.      GCS: GCS eye subscore is 4. GCS verbal subscore is 5. GCS motor subscore is 6.   Psychiatric:         Behavior: Behavior is cooperative.         Laboratory  Recent Results (from the past 24 hour(s))   Basic  Metabolic Panel    Collection Time: 09/12/24 12:41 AM   Result Value Ref Range    Sodium 136 135 - 145 mmol/L    Potassium 4.3 3.6 - 5.5 mmol/L    Chloride 100 96 - 112 mmol/L    Co2 27 20 - 33 mmol/L    Glucose 104 (H) 65 - 99 mg/dL    Bun 13 8 - 22 mg/dL    Creatinine 0.35 (L) 0.50 - 1.40 mg/dL    Calcium 8.4 (L) 8.5 - 10.5 mg/dL    Anion Gap 9.0 7.0 - 16.0   ESTIMATED GFR    Collection Time: 09/12/24 12:41 AM   Result Value Ref Range    GFR (CKD-EPI) 112 >60 mL/min/1.73 m 2       Fluids    Intake/Output Summary (Last 24 hours) at 9/12/2024 1233  Last data filed at 9/12/2024 1141  Gross per 24 hour   Intake --   Output 825 ml   Net -825 ml         Assessment/Plan  * Primary spontaneous pneumothorax- (present on admission)  Assessment & Plan  Large spontaneous pneumothorax   Attempted decompression in ED.  Chest tube placed in ED. (+) air leak noted/no drainage. Placed to suction.  9/4 CXR with no pneumothorax.  CT to suction with no air leak.  Continue chest tube to suction.    9/5 OR for thoracoscopic talc and abrasive pleurodesis. Bleb resection not indicated.   9/9 Chest tube to water seal. No pneumothorax on morning CXR.  9/10 Small 9mm pneumothorax.  -Place chest tube back to suction.   -PEP therapy.  9/12 Chest tube to water seal.   Daily chest xray's.    Trauma- (present on admission)  Assessment & Plan  Denies trauma      Discussed patient condition with RN, Patient, and trauma surgery. Demetrius Mccray MD

## 2024-09-12 NOTE — PROGRESS NOTES
"Patient and Granddaughter notified this RN of desire to transition back to full code. Per Patients granddaughter, patient \"did not understand what the other nurses were asking\".   This RN notified MD Bird. MD Bird communicating with patient's granddaughter over the phone.   "

## 2024-09-12 NOTE — CARE PLAN
The patient is Watcher - Medium risk of patient condition declining or worsening    Shift Goals  Clinical Goals: ct maintenance, oxygenation, is, ambulation  Patient Goals: rest  Family Goals: updates    Progress made toward(s) clinical / shift goals:  education provided on poc. Safety maintained, call light within reach. Pt calls appropriately. IS at bedside, encouraged. CT to suction, no air leak noted. Pt remains on 5L NC    Problem: Knowledge Deficit - Standard  Goal: Patient and family/care givers will demonstrate understanding of plan of care, disease process/condition, diagnostic tests and medications  Description: Target End Date:  1-3 days or as soon as patient condition allows    Document in Patient Education    1.  Patient and family/caregiver oriented to unit, equipment, visitation policy and means for communicating concern  2.  Complete/review Learning Assessment  3.  Assess knowledge level of disease process/condition, treatment plan, diagnostic tests and medications  4.  Explain disease process/condition, treatment plan, diagnostic tests and medications  Outcome: Progressing

## 2024-09-12 NOTE — PROGRESS NOTES
Palliative   Notified by patient's bedside RN patient does not wish to be DNR/DNI and would like to revoke code status to FULL code. POLST will be revoked and code status changed.  Thank you,       Janet Dickson, MSN, APRN, ACNPC-AG

## 2024-09-12 NOTE — PROGRESS NOTES
Hospital Medicine Daily Progress Note    Date of Service  9/12/2024    Chief Complaint  Joaquín Hartman is a 84 y.o. male admitted 9/3/2024 with spontaneous right pneumothorax.    Hospital Course  This 84-year-old male with a past medical significant for COPD, prior history of smoking, reports quit drinking 2 years ago presented to the ER on 9/3 at [facility] after he was noted to be acutely short of breath, found to have right-sided pneumothorax.  He was transferred to [facility] after chest tube placement.    Patient denies any trauma likely spontaneous pneumothorax.  During the stay in the hospital, he continued to remain on 5 to 6 L of oxygen, he stated that he does have fatigue chest pain on the right side he is very hard of hearing.    Chest x-ray obtained on 9/10 showed 9 mm pneumothorax, chest tube was placed on waterseal.  Patient is cachectic, nutrition consult has been placed, will provide Ensure    Interval events:  -- Patient is alert, awake, answering questions appropriately, currently requiring 4 to 5 L of oxygen  --Recommend 1.7, will provide 2 g of IV magnesium.  -- he to be frail and cachectic, provide boost 3 times daily with meals, nutrition consult.  Surgery continue to follow, patient does have a chest tube in the right side  -- PT and OT has added, recommend home health  -- continue iS  -- taper narcotic pain meds   -- palliative following     Discussed the CODE STATUS in Telugu, patient wanted to be full code are has been discussed with the surgery      9/12:  -- Patient is alert, awake and answering questions appropriately.  This with the patient bytranslator.  --Patient revoked CODE STATUS to full code.   --Patient currently has a chest tube currently to waterseal.  Will obtain a chest x-ray, surgery continue to follow for further recommendation  --chest x-ray showing: Stable tiny right pneumothorax, right pleural effusion, and right basilar airspace disease.   --  HOme health has been  ordered  -- o2 reuqirement has been better      I have discussed this patient's plan of care and discharge plan at IDT rounds today with Case Management, Nursing, Nursing leadership, and other members of the IDT team.    Consultants/Specialty  general surgery    Code Status  Full Code    Disposition  The patient is not medically cleared for discharge to home or a post-acute facility.      I have placed the appropriate orders for post-discharge needs.    Review of Systems  Review of Systems   Constitutional:  Negative for chills, diaphoresis, fever and malaise/fatigue.   HENT:  Negative for congestion and sore throat.    Eyes:  Negative for pain and discharge.   Respiratory:  Negative for cough, hemoptysis, sputum production, shortness of breath and wheezing.    Cardiovascular:  Positive for chest pain. Negative for palpitations, claudication and leg swelling.   Gastrointestinal:  Negative for abdominal pain, constipation, diarrhea, melena, nausea and vomiting.   Genitourinary:  Negative for dysuria, frequency and urgency.   Musculoskeletal:  Negative for back pain, joint pain, myalgias and neck pain.   Skin:  Negative for itching and rash.   Neurological:  Negative for dizziness, sensory change, speech change, focal weakness, loss of consciousness, weakness and headaches.   Endo/Heme/Allergies:  Does not bruise/bleed easily.   Psychiatric/Behavioral:  Negative for depression, substance abuse and suicidal ideas.         Physical Exam  Temp:  [36.5 °C (97.7 °F)-36.9 °C (98.4 °F)] 36.9 °C (98.4 °F)  Pulse:  [68-82] 81  Resp:  [16-18] 18  BP: ()/(56-79) 123/70  SpO2:  [91 %-96 %] 93 %    Physical Exam  Constitutional:       General: He is not in acute distress.     Appearance: He is not diaphoretic.      Comments: Frail, rib protuberance.   HENT:      Head: Normocephalic and atraumatic.      Mouth/Throat:      Mouth: Mucous membranes are dry.      Pharynx: No oropharyngeal exudate.   Eyes:      General: No scleral  icterus.        Right eye: No discharge.         Left eye: No discharge.      Conjunctiva/sclera: Conjunctivae normal.      Pupils: Pupils are equal, round, and reactive to light.   Neck:      Thyroid: No thyromegaly.      Vascular: No JVD.      Trachea: No tracheal deviation.   Cardiovascular:      Rate and Rhythm: Normal rate and regular rhythm.      Heart sounds: Normal heart sounds. No murmur heard.     No friction rub. No gallop.   Pulmonary:      Effort: Pulmonary effort is normal. No respiratory distress.      Breath sounds: Normal breath sounds. No wheezing or rales.      Comments: Right chest tube to suction.  Chest:      Chest wall: No tenderness.   Abdominal:      General: Bowel sounds are normal. There is no distension.      Palpations: Abdomen is soft. There is no mass.      Tenderness: There is no abdominal tenderness. There is no guarding or rebound.   Musculoskeletal:         General: No tenderness. Normal range of motion.      Cervical back: Normal range of motion and neck supple.   Lymphadenopathy:      Cervical: No cervical adenopathy.   Skin:     General: Skin is warm and dry.      Capillary Refill: Capillary refill takes less than 2 seconds.      Findings: No erythema or rash.   Neurological:      General: No focal deficit present.      Mental Status: He is alert and oriented to person, place, and time.      Cranial Nerves: No cranial nerve deficit.      Motor: No abnormal muscle tone.   Psychiatric:         Mood and Affect: Mood normal.         Behavior: Behavior normal.         Thought Content: Thought content normal.         Judgment: Judgment normal.         Fluids    Intake/Output Summary (Last 24 hours) at 9/12/2024 1406  Last data filed at 9/12/2024 1141  Gross per 24 hour   Intake --   Output 825 ml   Net -825 ml        Laboratory  Recent Labs     09/11/24  0624   WBC 7.0   RBC 3.63*   HEMOGLOBIN 11.8*   HEMATOCRIT 35.7*   MCV 98.3*   MCH 32.5   MCHC 33.1   RDW 49.0   PLATELETCT 279    MPV 9.4       Recent Labs     09/10/24  0138 09/11/24  0624 09/12/24  0041   SODIUM 134* 136 136   POTASSIUM 4.3 4.1 4.3   CHLORIDE 96 100 100   CO2 31 28 27   GLUCOSE 101* 102* 104*   BUN 12 12 13   CREATININE 0.35* 0.41* 0.35*   CALCIUM 8.2* 8.4* 8.4*                     Imaging  DX-CHEST-PORTABLE (1 VIEW)   Final Result      Stable tiny right pneumothorax, right pleural effusion, and right basilar airspace disease.      DX-CHEST-PORTABLE (1 VIEW)   Final Result         1. Tiny, 9 mm right apical pneumothorax.   2. Small amount of right pleural fluid and right basilar airspace disease again noted.   3. Severe emphysema.            [id number] VIEW   Final Result      No evidence of bowel obstruction.                  DX-CHEST-PORTABLE (1 VIEW)   Final Result         1. Tiny, 9 mm right apical pneumothorax.   2. Small amount of right pleural fluid and right basilar airspace disease again noted.   3. Severe emphysema.         DX-CHEST-PORTABLE (1 VIEW)   Final Result         1. No pneumothorax.   2. Small amount of right pleural fluid and right basilar airspace disease again noted.   3. Severe emphysema.      DX-CHEST-PORTABLE (1 VIEW)   Final Result         1. Tiny right apical pneumothorax.   2. Small amount of right pleural fluid and right basilar airspace disease and noted.   3. Severe emphysema.      DX-CHEST-PORTABLE (1 VIEW)   Final Result         1.  Interstitial pulmonary parenchymal prominence suggest chronic underlying lung disease, component of interstitial edema and/or infiltrates not excluded.   2.  Trace right apical pneumothorax, thoracostomy tube in place.   3.  Trace bilateral pleural effusions, stable since prior study.   4.  Atherosclerosis      DX-CHEST-PORTABLE (1 VIEW)   Final Result         1.  Interstitial pulmonary parenchymal prominence suggest chronic underlying lung disease, component of interstitial edema and/or infiltrates not excluded.   2.  Trace bilateral pleural effusions, new  since prior study.   3.  Atherosclerosis      DX-CHEST-PORTABLE (1 VIEW)   Final Result         1.  Interstitial pulmonary parenchymal prominence suggest chronic underlying lung disease, component of interstitial edema and/or infiltrates not excluded.      DX-CHEST-PORTABLE (1 VIEW)   Final Result      1.  Presents right chest tube      2.  Tiny right pneumothorax      CT-CHEST (THORAX) WITH   Final Result         1. Interval placement of right chest tube with small residual right pneumothorax.      2. Emphysematous changes with large bullae in the right middle lobe.   3. Patchy right basilar atelectasis.         DX-CHEST-PORTABLE (1 VIEW)   Final Result         No appreciable pneumothorax after right chest tube placement.      DX-CHEST-PORTABLE (1 VIEW)   Final Result      Stable large right pneumothorax.      DX-CHEST-PORTABLE (1 VIEW)   Final Result         Large right pneumothorax with collapsing of the right lung.            CRITICAL RESULT READ BACK: Preliminary findings discussed with and critical read back performed by Dr. VALERIA BOLANOS in the Emergency Department via telephone on 9/3/2024 12:08 PM           Assessment/Plan  * Primary spontaneous pneumothorax- (present on admission)  Assessment & Plan  Spontaneous likely due to bleb from emphysema and prior years of smoking.  S/P chest tube after initial catheter at OSH failed.  Monitor pleurovac + air leak, chest tube placed to  waterseal  Supplemental oxygen to keep SpO2 >89%   -- today his O2 requirement has been getting better, currently on 2 L of oxygen, chest tube to waterseal, follow surgery recommendations      Protein-calorie malnutrition, severe (HCC)  Assessment & Plan  Dietary following  Boost tid    Advance care planning  Assessment & Plan  Patient was of sound mind and voluntarily participated in the conversation.  Patient and nursing staff  were present for the conversation.  I discussed advance care planning face to face with the patient for at  least 18 minutes, including diagnosis, prognosis, plan of care, risks and benefits of any therapies that could be offered, as well as alternatives including palliation      Patient opted for full code     Dehydration- (present on admission)  Assessment & Plan  Clinically dry mucus membranes, poor skin turgor.  Improving   Encourage good po intake      Hyperglycemia  Assessment & Plan  Monitor labs    Elevated alkaline phosphatase level  Assessment & Plan  Alk Phos:178  Needs outpatient work up.  No current abdominal pain.    Hypokalemia  Assessment & Plan  Replete and monitor    COPD (chronic obstructive pulmonary disease) (HCC)- (present on admission)  Assessment & Plan  No exacerbation  RT per protocol  Monitor SpO2 and titrate oxygen to keep >89%  Nebulizer and inhalers    Leukocytosis  Assessment & Plan  Resolved   Cbc daily         VTE prophylaxis: xarelto    I have performed a physical exam and reviewed and updated ROS and Plan today (9/12/2024). In review of yesterday's note (9/11/2024), there are no changes except as documented above.

## 2024-09-13 ENCOUNTER — APPOINTMENT (OUTPATIENT)
Dept: RADIOLOGY | Facility: MEDICAL CENTER | Age: 84
DRG: 166 | End: 2024-09-13
Attending: HOSPITALIST
Payer: MEDICARE

## 2024-09-13 LAB
ANION GAP SERPL CALC-SCNC: 10 MMOL/L (ref 7–16)
BUN SERPL-MCNC: 18 MG/DL (ref 8–22)
CALCIUM SERPL-MCNC: 8.7 MG/DL (ref 8.5–10.5)
CHLORIDE SERPL-SCNC: 102 MMOL/L (ref 96–112)
CO2 SERPL-SCNC: 25 MMOL/L (ref 20–33)
CREAT SERPL-MCNC: 0.41 MG/DL (ref 0.5–1.4)
GFR SERPLBLD CREATININE-BSD FMLA CKD-EPI: 107 ML/MIN/1.73 M 2
GLUCOSE SERPL-MCNC: 94 MG/DL (ref 65–99)
POTASSIUM SERPL-SCNC: 4.1 MMOL/L (ref 3.6–5.5)
SODIUM SERPL-SCNC: 137 MMOL/L (ref 135–145)

## 2024-09-13 PROCEDURE — 770001 HCHG ROOM/CARE - MED/SURG/GYN PRIV*

## 2024-09-13 PROCEDURE — 80048 BASIC METABOLIC PNL TOTAL CA: CPT

## 2024-09-13 PROCEDURE — A9270 NON-COVERED ITEM OR SERVICE: HCPCS | Performed by: HOSPITALIST

## 2024-09-13 PROCEDURE — 36415 COLL VENOUS BLD VENIPUNCTURE: CPT

## 2024-09-13 PROCEDURE — 94640 AIRWAY INHALATION TREATMENT: CPT

## 2024-09-13 PROCEDURE — 71045 X-RAY EXAM CHEST 1 VIEW: CPT

## 2024-09-13 PROCEDURE — 700111 HCHG RX REV CODE 636 W/ 250 OVERRIDE (IP): Performed by: HOSPITALIST

## 2024-09-13 PROCEDURE — 700102 HCHG RX REV CODE 250 W/ 637 OVERRIDE(OP): Performed by: HOSPITALIST

## 2024-09-13 PROCEDURE — 99232 SBSQ HOSP IP/OBS MODERATE 35: CPT | Performed by: HOSPITALIST

## 2024-09-13 PROCEDURE — 99232 SBSQ HOSP IP/OBS MODERATE 35: CPT | Performed by: PHYSICIAN ASSISTANT

## 2024-09-13 RX ADMIN — KETOROLAC TROMETHAMINE 1 DROP: 0.5 SOLUTION OPHTHALMIC at 04:30

## 2024-09-13 RX ADMIN — BUDESONIDE 0.5 MG: 0.5 SUSPENSION RESPIRATORY (INHALATION) at 07:11

## 2024-09-13 RX ADMIN — RIVAROXABAN 10 MG: 10 TABLET, FILM COATED ORAL at 16:46

## 2024-09-13 RX ADMIN — PREDNISOLONE ACETATE 1 DROP: 10 SUSPENSION/ DROPS OPHTHALMIC at 04:30

## 2024-09-13 ASSESSMENT — ENCOUNTER SYMPTOMS
HEADACHES: 0
NAUSEA: 0
LOSS OF CONSCIOUSNESS: 0
DIARRHEA: 0
SPUTUM PRODUCTION: 0
FOCAL WEAKNESS: 0
SPEECH CHANGE: 0
CONSTIPATION: 0
WEAKNESS: 0
EYE DISCHARGE: 0
SENSORY CHANGE: 0
BRUISES/BLEEDS EASILY: 0
DEPRESSION: 0
DIAPHORESIS: 0
DIZZINESS: 0
MYALGIAS: 1
HEMOPTYSIS: 0
NECK PAIN: 0
WHEEZING: 0
CLAUDICATION: 0
PALPITATIONS: 0
ABDOMINAL PAIN: 0
FEVER: 0
VOMITING: 0
EYE PAIN: 0
BACK PAIN: 0
SHORTNESS OF BREATH: 0
COUGH: 0
CHILLS: 0
MYALGIAS: 0
SORE THROAT: 0

## 2024-09-13 ASSESSMENT — PAIN DESCRIPTION - PAIN TYPE
TYPE: ACUTE PAIN

## 2024-09-13 ASSESSMENT — LIFESTYLE VARIABLES: SUBSTANCE_ABUSE: 0

## 2024-09-13 NOTE — PROGRESS NOTES
Bedside report received.  Assessment complete.    A&O x 4. Patient calls appropriately. Afghan speaker only,  at bedside.  Patient ambulates with x1 assist with FWW. Bed alarm on.   Patient has 2/10 pain. No pharmacological interventions provided at this time. Patient medicated per MAR.  Denies N&V. Tolerating regular diet.  Right chest tube to water seal, patent, no air leaks noted, dressing in place, CDI.  + void, + flatus, + BM, last BM 9/12.  Patient denies SOB.  SCD's off.    Review plan with of care with patient. Call light and personal belongings within reach. Hourly rounding in place. All needs met at this time.

## 2024-09-13 NOTE — CARE PLAN
The patient is Stable - Low risk of patient condition declining or worsening    Shift Goals  Clinical Goals: monitor chest tube, utilize IS 10x/ hr, rest, comfort  Patient Goals: rest, comfort  Family Goals: updates    Problem: Knowledge Deficit - Standard  Goal: Patient and family/care givers will demonstrate understanding of plan of care, disease process/condition, diagnostic tests and medications  Description: Target End Date:  1-3 days or as soon as patient condition allows    Document in Patient Education    1.  Patient and family/caregiver oriented to unit, equipment, visitation policy and means for communicating concern  2.  Complete/review Learning Assessment  3.  Assess knowledge level of disease process/condition, treatment plan, diagnostic tests and medications  4.  Explain disease process/condition, treatment plan, diagnostic tests and medications  Outcome: Progressing     Problem: Respiratory  Goal: Patient will achieve/maintain optimum respiratory ventilation and gas exchange  Description: Target End Date:  Prior to discharge or change in level of care    Document on Assessment flowsheet    1.  Assess and monitor rate, rhythm, depth and effort of respiration  2.  Breath sounds assessed qshift and/or as needed  3.  Assess O2 saturation, administer/titrate oxygen as ordered  4.  Position patient for maximum ventilatory efficiency  5.  Turn, cough, and deep breath with splinting to improve effectiveness  6.  Collaborate with RT to administer medication/treatments per order  7.  Encourage use of incentive spirometer and encourage patient to cough after use and utilize splinting techniques if applicable  8.  Airway suctioning  9.  Monitor sputum production for changes in color, consistency and frequency  10. Perform frequent oral hygiene  11. Alternate physical activity with rest periods  Outcome: Progressing     Problem: Fall Risk  Goal: Patient will remain free from falls  Description: Target End Date:   Prior to discharge or change in level of care    Document interventions on the  [place] Fall Risk Assessment    1.  Assess for fall risk factors  2.  Implement fall precautions  Outcome: Progressing       Progress made toward(s) clinical / shift goals:  Pt. Is motivated to mobilize around the nurse's station. Pt. Mobilized with CNA in the AM. Pt utilized IS and scored 750 mL. Pt was educated on using the IS and was able to do it correctly.     Patient is not progressing towards the following goals:

## 2024-09-13 NOTE — PROGRESS NOTES
Hospital Medicine Daily Progress Note    Date of Service  9/13/2024    Chief Complaint  Joaquín Hartman is a 84 y.o. male admitted 9/3/2024 with spontaneous right pneumothorax.    Hospital Course  This 84-year-old male with a past medical significant for COPD, prior history of smoking, reports quit drinking 2 years ago presented to the ER on 9/3 at [facility] after he was noted to be acutely short of breath, found to have right-sided pneumothorax.  He was transferred to [facility] after chest tube placement.    Patient denies any trauma likely spontaneous pneumothorax.  During the stay in the hospital, he continued to remain on 5 to 6 L of oxygen, he stated that he does have fatigue chest pain on the right side he is very hard of hearing.    Chest x-ray obtained on 9/10 showed 9 mm pneumothorax, chest tube was placed on waterseal.  Patient is cachectic, nutrition consult has been placed, will provide Ensure    Interval events:  -- Patient is alert, awake, answering questions appropriately, currently requiring 4 to 5 L of oxygen  --Recommend 1.7, will provide 2 g of IV magnesium.  -- he to be frail and cachectic, provide boost 3 times daily with meals, nutrition consult.  Surgery continue to follow, patient does have a chest tube in the right side  -- PT and OT has added, recommend home health  -- continue iS  -- taper narcotic pain meds   -- palliative following     Discussed the CODE STATUS in Serbian, patient wanted to be full code are has been discussed with the surgery      9/12:  -- Patient is alert, awake and answering questions appropriately.  This with the patient bytranslator.  --Patient revoked CODE STATUS to full code.   --Patient currently has a chest tube currently to waterseal.  Will obtain a chest x-ray, surgery continue to follow for further recommendation  --chest x-ray showing: Stable tiny right pneumothorax, right pleural effusion, and right basilar airspace disease.   --  HOme health has been  ordered  -- o2 reuqirement has been better      9/13:  -- Patient is alert, awake, answering questions appropriately.  Patient is Slovenian-speaking.  -- His breathing has been getting better, he is walking hallway.  He is currently monitor of oxygen, does have a chest tube to waterseal.  Surgery following, will follow surgery recommendation, will continue daily chest x-ray  -- need to be oob tid    I have discussed this patient's plan of care and discharge plan at IDT rounds today with Case Management, Nursing, Nursing leadership, and other members of the IDT team.    Consultants/Specialty  general surgery    Code Status  Full Code    Disposition  The patient is not medically cleared for discharge to home or a post-acute facility.  Anticipate discharge to: home with organized home healthcare and close outpatient follow-up    I have placed the appropriate orders for post-discharge needs.    Review of Systems  Review of Systems   Constitutional:  Negative for chills, diaphoresis, fever and malaise/fatigue.   HENT:  Negative for congestion and sore throat.    Eyes:  Negative for pain and discharge.   Respiratory:  Negative for cough, hemoptysis, sputum production, shortness of breath and wheezing.    Cardiovascular:  Positive for chest pain. Negative for palpitations, claudication and leg swelling.   Gastrointestinal:  Negative for abdominal pain, constipation, diarrhea, melena, nausea and vomiting.   Genitourinary:  Negative for dysuria, frequency and urgency.   Musculoskeletal:  Negative for back pain, joint pain, myalgias and neck pain.   Skin:  Negative for itching and rash.   Neurological:  Negative for dizziness, sensory change, speech change, focal weakness, loss of consciousness, weakness and headaches.   Endo/Heme/Allergies:  Does not bruise/bleed easily.   Psychiatric/Behavioral:  Negative for depression, substance abuse and suicidal ideas.         Physical Exam  Temp:  [36.2 °C (97.2 °F)-37 °C (98.6 °F)] 36.4  °C (97.5 °F)  Pulse:  [69-89] 74  Resp:  [16-22] 16  BP: ()/(53-64) 117/64  SpO2:  [92 %-95 %] 92 %    Physical Exam  Constitutional:       General: He is not in acute distress.     Appearance: He is not diaphoretic.      Comments: Frail, rib protuberance.   HENT:      Head: Normocephalic and atraumatic.      Mouth/Throat:      Mouth: Mucous membranes are dry.      Pharynx: No oropharyngeal exudate.   Eyes:      General: No scleral icterus.        Right eye: No discharge.         Left eye: No discharge.      Conjunctiva/sclera: Conjunctivae normal.      Pupils: Pupils are equal, round, and reactive to light.   Neck:      Thyroid: No thyromegaly.      Vascular: No JVD.      Trachea: No tracheal deviation.   Cardiovascular:      Rate and Rhythm: Normal rate and regular rhythm.      Heart sounds: Normal heart sounds. No murmur heard.     No friction rub. No gallop.   Pulmonary:      Effort: Pulmonary effort is normal. No respiratory distress.      Breath sounds: Normal breath sounds. No wheezing or rales.      Comments: Right chest tube to suction.  Chest:      Chest wall: No tenderness.   Abdominal:      General: Bowel sounds are normal. There is no distension.      Palpations: Abdomen is soft. There is no mass.      Tenderness: There is no abdominal tenderness. There is no guarding or rebound.   Musculoskeletal:         General: No tenderness. Normal range of motion.      Cervical back: Normal range of motion and neck supple.   Lymphadenopathy:      Cervical: No cervical adenopathy.   Skin:     General: Skin is warm and dry.      Capillary Refill: Capillary refill takes less than 2 seconds.      Findings: No erythema or rash.   Neurological:      General: No focal deficit present.      Mental Status: He is alert and oriented to person, place, and time.      Cranial Nerves: No cranial nerve deficit.      Motor: No abnormal muscle tone.   Psychiatric:         Mood and Affect: Mood normal.         Behavior:  Behavior normal.         Thought Content: Thought content normal.         Judgment: Judgment normal.         Fluids    Intake/Output Summary (Last 24 hours) at 9/13/2024 1519  Last data filed at 9/13/2024 1140  Gross per 24 hour   Intake 540 ml   Output 858 ml   Net -318 ml        Laboratory  Recent Labs     09/11/24  0624   WBC 7.0   RBC 3.63*   HEMOGLOBIN 11.8*   HEMATOCRIT 35.7*   MCV 98.3*   MCH 32.5   MCHC 33.1   RDW 49.0   PLATELETCT 279   MPV 9.4       Recent Labs     09/11/24  0624 09/12/24  0041 09/13/24  0201   SODIUM 136 136 137   POTASSIUM 4.1 4.3 4.1   CHLORIDE 100 100 102   CO2 28 27 25   GLUCOSE 102* 104* 94   BUN 12 13 18   CREATININE 0.41* 0.35* 0.41*   CALCIUM 8.4* 8.4* 8.7                     Imaging  DX-CHEST-PORTABLE (1 VIEW)   Final Result      Stable tiny right pneumothorax, right pleural effusion, and right basilar airspace disease.      DX-CHEST-PORTABLE (1 VIEW)   Final Result      Stable tiny right pneumothorax, right pleural effusion, and right basilar airspace disease.      DX-CHEST-PORTABLE (1 VIEW)   Final Result         1. Tiny, 9 mm right apical pneumothorax.   2. Small amount of right pleural fluid and right basilar airspace disease again noted.   3. Severe emphysema.            [id number] VIEW   Final Result      No evidence of bowel obstruction.                  DX-CHEST-PORTABLE (1 VIEW)   Final Result         1. Tiny, 9 mm right apical pneumothorax.   2. Small amount of right pleural fluid and right basilar airspace disease again noted.   3. Severe emphysema.         DX-CHEST-PORTABLE (1 VIEW)   Final Result         1. No pneumothorax.   2. Small amount of right pleural fluid and right basilar airspace disease again noted.   3. Severe emphysema.      DX-CHEST-PORTABLE (1 VIEW)   Final Result         1. Tiny right apical pneumothorax.   2. Small amount of right pleural fluid and right basilar airspace disease and noted.   3. Severe emphysema.      DX-CHEST-PORTABLE (1 VIEW)    Final Result         1.  Interstitial pulmonary parenchymal prominence suggest chronic underlying lung disease, component of interstitial edema and/or infiltrates not excluded.   2.  Trace right apical pneumothorax, thoracostomy tube in place.   3.  Trace bilateral pleural effusions, stable since prior study.   4.  Atherosclerosis      DX-CHEST-PORTABLE (1 VIEW)   Final Result         1.  Interstitial pulmonary parenchymal prominence suggest chronic underlying lung disease, component of interstitial edema and/or infiltrates not excluded.   2.  Trace bilateral pleural effusions, new since prior study.   3.  Atherosclerosis      DX-CHEST-PORTABLE (1 VIEW)   Final Result         1.  Interstitial pulmonary parenchymal prominence suggest chronic underlying lung disease, component of interstitial edema and/or infiltrates not excluded.      DX-CHEST-PORTABLE (1 VIEW)   Final Result      1.  Presents right chest tube      2.  Tiny right pneumothorax      CT-CHEST (THORAX) WITH   Final Result         1. Interval placement of right chest tube with small residual right pneumothorax.      2. Emphysematous changes with large bullae in the right middle lobe.   3. Patchy right basilar atelectasis.         DX-CHEST-PORTABLE (1 VIEW)   Final Result         No appreciable pneumothorax after right chest tube placement.      DX-CHEST-PORTABLE (1 VIEW)   Final Result      Stable large right pneumothorax.      DX-CHEST-PORTABLE (1 VIEW)   Final Result         Large right pneumothorax with collapsing of the right lung.            CRITICAL RESULT READ BACK: Preliminary findings discussed with and critical read back performed by Dr. VALERIA BOLANOS in the Emergency Department via telephone on 9/3/2024 12:08 PM           Assessment/Plan  * Primary spontaneous pneumothorax- (present on admission)  Assessment & Plan  Spontaneous likely due to bleb from emphysema and prior years of smoking.  S/P chest tube after initial catheter at OSH  failed.  Monitor pleurovac + air leak, chest tube placed to  waterseal  Supplemental oxygen to keep SpO2 >89%   -- today his O2 requirement has been getting better, currently on 1 L of oxygen, chest tube to waterseal, follow surgery recommendations   -- Chest tube to water seal      Protein-calorie malnutrition, severe (HCC)  Assessment & Plan  Dietary following  Boost tid    Advance care planning  Assessment & Plan  Patient was of sound mind and voluntarily participated in the conversation.  Patient and nursing staff  were present for the conversation.  I discussed advance care planning face to face with the patient for at least 18 minutes, including diagnosis, prognosis, plan of care, risks and benefits of any therapies that could be offered, as well as alternatives including palliation      Patient opted for full code     Dehydration- (present on admission)  Assessment & Plan  Clinically dry mucus membranes, poor skin turgor.  Improving   Encourage good po intake      Hyperglycemia  Assessment & Plan  Monitor labs    Elevated alkaline phosphatase level  Assessment & Plan  Alk Phos:178  Needs outpatient work up.  No current abdominal pain.    Hypokalemia  Assessment & Plan  Replete and monitor    COPD (chronic obstructive pulmonary disease) (HCC)- (present on admission)  Assessment & Plan  No exacerbation  RT per protocol  Monitor SpO2 and titrate oxygen to keep >89%  Nebulizer and inhalers    Leukocytosis  Assessment & Plan  Resolved   Cbc daily         VTE prophylaxis: xarelto    I have performed a physical exam and reviewed and updated ROS and Plan today (9/13/2024). In review of yesterday's note (9/12/2024), there are no changes except as documented above.

## 2024-09-13 NOTE — PROGRESS NOTES
Received report from previous shift RN  Assessment complete.  A&O x 4 . Patient calls appropriately.  Patient ambulates with x1 assist and FWW. Bed alarm on .  Patient has 1 /10 pain. Pain managed with prescribed medications.  Denies N&V. Tolerating regular diet.  Right chest tube to water seal, patent, no air leaks noted, dressing in place, CDI.    + void, + flatus, +BM (9/13/2024).  Patient denies SOB. Spo2>95% on 2L O2.    Reviewed plan of care with patient. Call light and personal belongings with in reach. Hourly rounding in place. All needs met at this time.

## 2024-09-13 NOTE — PROGRESS NOTES
Trauma / Surgical Daily Progress Note    Date of Service  9/13/2024    Chief Complaint  84 y.o. male admitted 9/3/2024 with spontaneous pneumothorax. Non-trauma.     9/3 Right tube thoracostomy  9/5 Thoracoscopic talc and abrasive pleurodesis     Interval Events  CXR with stable tiny right pneumothorax.   Chest tube to water seal yesterday.     - Aggressive pulmonary hygiene.   - IS efforts and PEP.   - Continue chest tube to water seal.   - Plan to remove chest tube tomorrow if AM CXR is stable.     Review of Systems  Review of Systems   Constitutional:  Negative for chills, fever and malaise/fatigue.   Respiratory:  Negative for cough and shortness of breath.    Cardiovascular:  Negative for chest pain.   Gastrointestinal:  Negative for nausea and vomiting.   Musculoskeletal:  Positive for myalgias. Negative for back pain and neck pain.      Vital Signs  Temp:  [36.2 °C (97.2 °F)-37 °C (98.6 °F)] 36.4 °C (97.5 °F)  Pulse:  [69-89] 74  Resp:  [16-22] 16  BP: ()/(53-64) 117/64  SpO2:  [92 %-95 %] 92 %    Physical Exam  Physical Exam  Vitals and nursing note reviewed.   Constitutional:       General: He is awake. He is not in acute distress.     Appearance: He is not ill-appearing, toxic-appearing or diaphoretic.      Comments: Elderly, frail and deconditioned  Sitting in chair at bedside    Pulmonary:      Effort: Pulmonary effort is normal. No respiratory distress.      Comments: Chest tube to water seal, no air leak.   Chest:      Chest wall: Tenderness present.   Abdominal:      General: There is no distension.      Tenderness: There is no abdominal tenderness. There is no guarding or rebound.   Musculoskeletal:      Comments: Moves all extremities   Skin:     General: Skin is warm and dry.      Capillary Refill: Capillary refill takes less than 2 seconds.   Neurological:      Mental Status: He is alert.      GCS: GCS eye subscore is 4. GCS verbal subscore is 5. GCS motor subscore is 6.   Psychiatric:          Behavior: Behavior is cooperative.         Laboratory  Recent Results (from the past 24 hour(s))   Basic Metabolic Panel    Collection Time: 09/13/24  2:01 AM   Result Value Ref Range    Sodium 137 135 - 145 mmol/L    Potassium 4.1 3.6 - 5.5 mmol/L    Chloride 102 96 - 112 mmol/L    Co2 25 20 - 33 mmol/L    Glucose 94 65 - 99 mg/dL    Bun 18 8 - 22 mg/dL    Creatinine 0.41 (L) 0.50 - 1.40 mg/dL    Calcium 8.7 8.5 - 10.5 mg/dL    Anion Gap 10.0 7.0 - 16.0   ESTIMATED GFR    Collection Time: 09/13/24  2:01 AM   Result Value Ref Range    GFR (CKD-EPI) 107 >60 mL/min/1.73 m 2       Fluids    Intake/Output Summary (Last 24 hours) at 9/13/2024 1425  Last data filed at 9/13/2024 1140  Gross per 24 hour   Intake 540 ml   Output 858 ml   Net -318 ml     Assessment/Plan  * Primary spontaneous pneumothorax- (present on admission)  Assessment & Plan  Large spontaneous pneumothorax   Attempted decompression in ED.  Chest tube placed in ED. (+) air leak noted/no drainage. Placed to suction.  9/4 CXR with no pneumothorax.  CT to suction with no air leak.  Continue chest tube to suction.    9/5 OR for thoracoscopic talc and abrasive pleurodesis. Bleb resection not indicated.   9/9 Chest tube to water seal. No pneumothorax on morning CXR.  9/10 Small 9mm pneumothorax.  -Place chest tube back to suction.   -PEP therapy.  9/12 Chest tube to water seal.   Daily chest xray's.    Trauma- (present on admission)  Assessment & Plan  Denies trauma      Discussed patient condition with RN, Patient, and trauma surgery. Demetrius Mccray MD

## 2024-09-13 NOTE — DISCHARGE PLANNING
Case Management Discharge Planning    Admission Date: 9/3/2024  GMLOS: 8.4  ALOS: 10    6-Clicks ADL Score: 19  6-Clicks Mobility Score: 21      Anticipated Discharge Dispo: Discharge Disposition: D/T to home under A care in anticipation of covered skilled care (06)      Action(s) Taken:  RNCM received verbal consent to sign chart copy of 2nd IMM.

## 2024-09-13 NOTE — CARE PLAN
The patient is Stable - Low risk of patient condition declining or worsening    Problem: Knowledge Deficit - Standard  Goal: Patient and family/care givers will demonstrate understanding of plan of care, disease process/condition, diagnostic tests and medications  Outcome: Progressing     Problem: Pain - Standard  Goal: Alleviation of pain or a reduction in pain to the patient’s comfort goal  Outcome: Progressing     Shift Goals  Clinical Goals: monitor chest tube, rest, comfort  Patient Goals: rest, comfort  Family Goals: updates    Progress made toward(s) clinical / shift goals:  Patient's pain managed per MAR. Chest tube to water seal, patient tolerating, patent, no air leaks noted, dressing in place, CDI. Patient able to rest during this shift.     Patient is not progressing towards the following goals:

## 2024-09-14 ENCOUNTER — APPOINTMENT (OUTPATIENT)
Dept: RADIOLOGY | Facility: MEDICAL CENTER | Age: 84
DRG: 166 | End: 2024-09-14
Attending: HOSPITALIST
Payer: MEDICARE

## 2024-09-14 LAB
ANION GAP SERPL CALC-SCNC: 12 MMOL/L (ref 7–16)
BUN SERPL-MCNC: 19 MG/DL (ref 8–22)
CALCIUM SERPL-MCNC: 8.8 MG/DL (ref 8.5–10.5)
CHLORIDE SERPL-SCNC: 103 MMOL/L (ref 96–112)
CO2 SERPL-SCNC: 25 MMOL/L (ref 20–33)
CREAT SERPL-MCNC: 0.37 MG/DL (ref 0.5–1.4)
GFR SERPLBLD CREATININE-BSD FMLA CKD-EPI: 110 ML/MIN/1.73 M 2
GLUCOSE SERPL-MCNC: 97 MG/DL (ref 65–99)
POTASSIUM SERPL-SCNC: 4.3 MMOL/L (ref 3.6–5.5)
SODIUM SERPL-SCNC: 140 MMOL/L (ref 135–145)

## 2024-09-14 PROCEDURE — A9270 NON-COVERED ITEM OR SERVICE: HCPCS | Performed by: HOSPITALIST

## 2024-09-14 PROCEDURE — 700102 HCHG RX REV CODE 250 W/ 637 OVERRIDE(OP): Performed by: HOSPITALIST

## 2024-09-14 PROCEDURE — 770001 HCHG ROOM/CARE - MED/SURG/GYN PRIV*

## 2024-09-14 PROCEDURE — 36415 COLL VENOUS BLD VENIPUNCTURE: CPT

## 2024-09-14 PROCEDURE — 99232 SBSQ HOSP IP/OBS MODERATE 35: CPT | Performed by: PHYSICIAN ASSISTANT

## 2024-09-14 PROCEDURE — 99233 SBSQ HOSP IP/OBS HIGH 50: CPT | Performed by: HOSPITALIST

## 2024-09-14 PROCEDURE — 80048 BASIC METABOLIC PNL TOTAL CA: CPT

## 2024-09-14 PROCEDURE — 71045 X-RAY EXAM CHEST 1 VIEW: CPT

## 2024-09-14 RX ADMIN — RIVAROXABAN 10 MG: 10 TABLET, FILM COATED ORAL at 17:58

## 2024-09-14 RX ADMIN — PREDNISOLONE ACETATE 1 DROP: 10 SUSPENSION/ DROPS OPHTHALMIC at 04:40

## 2024-09-14 RX ADMIN — SENNOSIDES AND DOCUSATE SODIUM 2 TABLET: 50; 8.6 TABLET ORAL at 17:58

## 2024-09-14 RX ADMIN — KETOROLAC TROMETHAMINE 1 DROP: 0.5 SOLUTION OPHTHALMIC at 04:40

## 2024-09-14 ASSESSMENT — ENCOUNTER SYMPTOMS
VOMITING: 0
FOCAL WEAKNESS: 0
LOSS OF CONSCIOUSNESS: 0
CLAUDICATION: 0
ABDOMINAL PAIN: 0
DEPRESSION: 0
BRUISES/BLEEDS EASILY: 0
MYALGIAS: 0
SORE THROAT: 0
NECK PAIN: 0
EYE DISCHARGE: 0
DIAPHORESIS: 0
SPEECH CHANGE: 0
SPUTUM PRODUCTION: 0
CONSTIPATION: 0
DIARRHEA: 0
FEVER: 0
HEMOPTYSIS: 0
COUGH: 0
WEAKNESS: 0
HEADACHES: 0
EYE PAIN: 0
BACK PAIN: 0
CHILLS: 0
MYALGIAS: 1
WHEEZING: 0
DIZZINESS: 0
SENSORY CHANGE: 0
SHORTNESS OF BREATH: 0
NAUSEA: 0
PALPITATIONS: 0

## 2024-09-14 ASSESSMENT — LIFESTYLE VARIABLES: SUBSTANCE_ABUSE: 0

## 2024-09-14 ASSESSMENT — PAIN DESCRIPTION - PAIN TYPE: TYPE: ACUTE PAIN

## 2024-09-14 NOTE — CARE PLAN
The patient is Stable - Low risk of patient condition declining or worsening    Shift Goals  Clinical Goals: chest tube moitoring, pain control, safety and comfrot  Patient Goals: rest  Family Goals: n/a    Progress made toward(s) clinical / shift goals:    Problem: Knowledge Deficit - Standard  Goal: Patient and family/care givers will demonstrate understanding of plan of care, disease process/condition, diagnostic tests and medications  Outcome: Progressing     Problem: Pain - Standard  Goal: Alleviation of pain or a reduction in pain to the patient’s comfort goal  Outcome: Progressing     Problem: Mobility  Goal: Patient's capacity to carry out activities will improve  Outcome: Progressing     Problem: Fall Risk  Goal: Patient will remain free from falls  Outcome: Progressing     Patient is alert and oriented, on 1l NC.   Fall protocol in effect. Bed in lowest position. Brakes and bed alarm on.   Maintained a clutter free environment. Skid socks on. Call light and personal belongings within reach.  Educated on pain scale.   Patient educated on POC. Encouraged verbalize of feelings.   All questions were answered.    Patient is not progressing towards the following goals:       EMERGENCY DEPARTMENT HISTORY AND PHYSICAL EXAM      Date: 11/14/2022  Patient Name: Isaac Alicea    History of Presenting Illness     Chief Complaint   Patient presents with    Mental Health Problem     Pt reports getting more and more depression and out of touch with reality. When he is alone he is crying. Pt Diagnosed with Bipolar I but has been off meds for two weeks , as a person took them from him at the [facility] in [place]. Pt is shaking. History Provided By: Patient    HPI: Katie Carver, 41 y.o. male  presents to the ED with cc of depression. Patient states he has a history of bipolar has been off his medications for 2 weeks. Patient arrives feeling shaky and feels that he is \"out of touch with reality\". Patient screens high risk for suicide in triage. He is awake and alert. Past History     Past Medical History:  Past Medical History:   Diagnosis Date    Psychiatric disorder     phobias       Past Surgical History:  No past surgical history on file. Medications:  No current facility-administered medications on file prior to encounter. Current Outpatient Medications on File Prior to Encounter   Medication Sig Dispense Refill    clonazePAM (KLONOPIN) 0.125 mg disintegrating tablet Take 0.125 mg by mouth three (3) times daily. QUEtiapine (SEROQUEL) 300 mg tablet Take 300 mg by mouth daily. PROPRANOLOL HCL (INDERAL PO) Take 10 mg by mouth.      lithium carbonate 600 mg capsule Take 750 mg by mouth nightly. fluvoxaMINE (LUVOX) 50 mg tablet Take 50 mg by mouth daily. fluvoxaMINE (LUVOX) 100 mg tablet Take  by mouth every evening. Family History:  No family history on file. Social History:  Social History     Tobacco Use    Smoking status: Never   Substance Use Topics    Alcohol use: Yes     Comment: socially       Allergies:   Allergies   Allergen Reactions    Tetracycline Unknown (comments)     Felt strange       All the above components of the past history are auto-populated from the electronic record. They have been reviewed and the patient has been interviewed for any pertinent past history that pertains to the patient's chief complaint and reason for visit. Not all pre-populated components may be accurate at the time this note was generated. Review of Systems   Review of Systems   Constitutional:  Negative for chills and fever. HENT:  Negative for congestion, ear pain, rhinorrhea, sore throat and trouble swallowing. Eyes:  Negative for visual disturbance. Respiratory:  Negative for cough, chest tightness and shortness of breath. Cardiovascular:  Negative for chest pain and palpitations. Gastrointestinal:  Negative for abdominal pain, blood in stool, constipation, diarrhea, nausea and vomiting. Genitourinary:  Negative for decreased urine volume, difficulty urinating, dysuria and frequency. Musculoskeletal:  Negative for back pain and neck pain. Skin:  Negative for color change and rash. Neurological:  Positive for tremors. Negative for dizziness, weakness, light-headedness and headaches. Psychiatric/Behavioral:  Positive for dysphoric mood and suicidal ideas. The patient is nervous/anxious. Physical Exam   Physical Exam  Vitals and nursing note reviewed. Constitutional:       General: He is not in acute distress. Appearance: He is well-developed. He is not ill-appearing. HENT:      Head: Normocephalic. Eyes:      Conjunctiva/sclera: Conjunctivae normal.   Cardiovascular:      Rate and Rhythm: Normal rate and regular rhythm. Pulmonary:      Effort: Pulmonary effort is normal. No accessory muscle usage or respiratory distress. Abdominal:      General: There is no distension. Musculoskeletal:      Cervical back: Normal range of motion. Skin:     General: Skin is warm and dry. Neurological:      Mental Status: He is alert and oriented to person, place, and time.        Diagnostic Study Results     Labs - Recent Results (from the past 24 hour(s))   URINALYSIS W/ REFLEX CULTURE    Collection Time: 11/14/22  3:39 PM    Specimen: Urine   Result Value Ref Range    Color YELLOW/STRAW      Appearance CLEAR CLEAR      Specific gravity <1.005     pH (UA) 6.5 5.0 - 8.0      Protein Negative NEG mg/dL    Glucose Negative NEG mg/dL    Ketone Negative NEG mg/dL    Bilirubin Negative NEG      Blood Negative NEG      Urobilinogen 0.2 0.2 - 1.0 EU/dL    Nitrites Negative NEG      Leukocyte Esterase Negative NEG      UA:UC IF INDICATED CULTURE NOT INDICATED BY UA RESULT      WBC 0-4 0 - 4 /hpf    RBC 0-5 0 - 5 /hpf    Epithelial cells FEW FEW /lpf    Bacteria Negative NEG /hpf    Hyaline cast 0-2 0 - 2 /lpf   DRUG SCREEN, URINE    Collection Time: 11/14/22  3:39 PM   Result Value Ref Range    AMPHETAMINES Negative NEG      BARBITURATES Negative NEG      BENZODIAZEPINES Negative NEG      COCAINE Negative NEG      METHADONE Negative NEG      OPIATES Negative NEG      PCP(PHENCYCLIDINE) Negative NEG      THC (TH-CANNABINOL) Negative NEG      Drug screen comment (NOTE)    CBC WITH AUTOMATED DIFF    Collection Time: 11/14/22  3:42 PM   Result Value Ref Range    WBC 8.7 4.1 - 11.1 K/uL    RBC 4.56 4.10 - 5.70 M/uL    HGB 14.4 12.1 - 17.0 g/dL    HCT 43.1 36.6 - 50.3 %    MCV 94.5 80.0 - 99.0 FL    MCH 31.6 26.0 - 34.0 PG    MCHC 33.4 30.0 - 36.5 g/dL    RDW 12.2 11.5 - 14.5 %    PLATELET 614 876 - 566 K/uL    MPV 10.5 8.9 - 12.9 FL    NRBC 0.0 0  WBC    ABSOLUTE NRBC 0.00 0.00 - 0.01 K/uL    NEUTROPHILS 75 32 - 75 %    LYMPHOCYTES 17 12 - 49 %    MONOCYTES 6 5 - 13 %    EOSINOPHILS 1 0 - 7 %    BASOPHILS 1 0 - 1 %    IMMATURE GRANULOCYTES 0 0.0 - 0.5 %    ABS. NEUTROPHILS 6.6 1.8 - 8.0 K/UL    ABS. LYMPHOCYTES 1.5 0.8 - 3.5 K/UL    ABS. MONOCYTES 0.5 0.0 - 1.0 K/UL    ABS. EOSINOPHILS 0.1 0.0 - 0.4 K/UL    ABS. BASOPHILS 0.1 0.0 - 0.1 K/UL    ABS. IMM.  GRANS. 0.0 0.00 - 0.04 K/UL    DF AUTOMATED     METABOLIC PANEL, COMPREHENSIVE Collection Time: 11/14/22  3:42 PM   Result Value Ref Range    Sodium 139 136 - 145 mmol/L    Potassium 3.6 3.5 - 5.1 mmol/L    Chloride 105 97 - 108 mmol/L    CO2 28 21 - 32 mmol/L    Anion gap 6 5 - 15 mmol/L    Glucose 172 (H) 65 - 100 mg/dL    BUN 12 6 - 20 MG/DL    Creatinine 1.19 0.70 - 1.30 MG/DL    BUN/Creatinine ratio 10 (L) 12 - 20      eGFR >60 >60 ml/min/1.73m2    Calcium 8.9 8.5 - 10.1 MG/DL    Bilirubin, total 0.4 0.2 - 1.0 MG/DL    ALT (SGPT) 16 12 - 78 U/L    AST (SGOT) 5 (L) 15 - 37 U/L    Alk. phosphatase 84 45 - 117 U/L    Protein, total 6.8 6.4 - 8.2 g/dL    Albumin 3.7 3.5 - 5.0 g/dL    Globulin 3.1 2.0 - 4.0 g/dL    A-G Ratio 1.2 1.1 - 2.2     ACETAMINOPHEN    Collection Time: 11/14/22  3:42 PM   Result Value Ref Range    Acetaminophen level <2 (L) 10 - 30 ug/mL   SALICYLATE    Collection Time: 11/14/22  3:42 PM   Result Value Ref Range    Salicylate level <1.0 (L) 2.8 - 20.0 MG/DL   ETHYL ALCOHOL    Collection Time: 11/14/22  3:42 PM   Result Value Ref Range    ALCOHOL(ETHYL),SERUM <10 <10 MG/DL   COVID-19 RAPID TEST    Collection Time: 11/14/22  5:08 PM   Result Value Ref Range    Specimen source Nasopharyngeal      COVID-19 rapid test Not detected NOTD     COVID-19 WITH INFLUENZA A/B    Collection Time: 11/14/22  5:08 PM   Result Value Ref Range    SARS-CoV-2 by PCR Not detected NOTD      Influenza A by PCR Not detected NOTD      Influenza B by PCR Not detected NOTD         Radiologic Studies -   No orders to display     CT Results  (Last 48 hours)      None          CXR Results  (Last 48 hours)      None              Medical Decision Making     I reviewed the vital signs, available nursing notes, past medical history, past surgical history, family history and social history. Vital Signs-I have reviewed the vital signs that have been made available during the patient's emergency department visit.   The vital signs auto-populated below are obtained mostly by electronic means through monitoring devices that have been downloaded into the patient's chart by the nursing staff. Some vital signs are not downloaded into the chart until after the patient has been discharged and this note has been completed, therefore some vital signs may not be available to the physician for review prior to patient's discharge or admission. The physician has reviewed the patient's triage vital signs, monitored the electronic monitoring devices remotely for any significant vital sign abnormalities, and have reviewed vital signs prior to discharge. Some vital signs reviewed at bedside or remotely utilizing electronic monitoring devices may be different than the vital signs downloaded into the electronic medical record. Some vital signs may be erroneous and inaccurate since they are obtained by electronic monitoring devices, and not all vital signs are verified for accuracy by nursing staff prior to downloading into the patient's chart. Patient Vitals for the past 24 hrs:   Temp Pulse Resp BP SpO2   11/14/22 1528 97.5 °F (36.4 °C) 75 18 136/87 100 %         Records Reviewed: Nursing notes for today's visit have been reviewed. I have also reviewed most recent medical records pertinent to today's complaints, if available in our medical record system. I have also reviewed all labs and imaging results from previous results in comparison to results obtained today. If an EKG was obtained today, it has been compared to previous EKGs, if available. If arriving via EMS, the EMS report has been reviewed if made available to us within the patient's time in the emergency department. ED Course and Medical Decision Making:       MDM  Number of Diagnoses or Management Options  Diagnosis management comments: Patient with bipolar presents with depression and high risk suicidal screening. Patient has been off his medicines for few weeks. We will restart his medications here and behavioral health is recommending admission.   He is medically cleared for psychiatric evaluation and admission. No medical complaints. Amount and/or Complexity of Data Reviewed  Clinical lab tests: ordered and reviewed  Review and summarize past medical records: yes  Discuss the patient with other providers: yes    Risk of Complications, Morbidity, and/or Mortality  Presenting problems: high  Diagnostic procedures: low  Management options: moderate    Patient Progress  Patient progress: stable           Orders Placed This Encounter    COVID-19 RAPID TEST    COVID-19 WITH INFLUENZA A/B    URINALYSIS W/ REFLEX CULTURE    DRUG SCREEN, URINE    CBC WITH AUTOMATED DIFF    METABOLIC PANEL, COMPREHENSIVE    ACETAMINOPHEN    SALICYLATE    BLOOD ALCOHOL (Ethyl Alcohol)    LITHIUM    CONSTANT OBSERVATION    NURSING-MISCELLANEOUS: patient need a safety diet tray CONTINUOUS    SUICIDE PRECAUTIONS    clonazePAM (KlonoPIN) tablet 0.5 mg    ARIPiprazole (ABILIFY) tablet 20 mg    lithium carbonate tablet 300 mg    IP CONSULT TO [facility]SMART       Procedures      Critical Care Time:   0    Disposition:  Admit to behavioral health, location pending        Diagnosis     Clinical Impression:   1.  Bipolar disorder, current episode depressed, severe, without psychotic features ([facility] [address]

## 2024-09-14 NOTE — PROGRESS NOTES
Hospital Medicine Daily Progress Note    Date of Service  9/14/2024    Chief Complaint  Joaquín Hartman is a 84 y.o. male admitted 9/3/2024 with spontaneous right pneumothorax.    Hospital Course  This 84-year-old male with a past medical significant for COPD, prior history of smoking, reports quit drinking 2 years ago presented to the ER on 9/3 at [facility] after he was noted to be acutely short of breath, found to have right-sided pneumothorax.  [place] [facility] [place].    Patient denies any trauma likely spontaneous pneumothorax.  He has a tube in place, today he is intubated abdomen: Abdominal evaluation myeloproliferative 61, dermatology     chest x-ray obtained on 9/10 showed 9 mm pneumothorax, chest tube was placed on waterseal.  Patient is cachectic, nutrition consult has been placed, will provide Ensure    Interval events:  -- Patient is alert, awake, answering questions appropriately, currently requiring 4 to 5 L of oxygen  --Recommend 1.7, will provide 2 g of IV magnesium.  -- he to be frail and cachectic, provide boost 3 times daily with meals, nutrition consult.  Surgery continue to follow, patient does have a chest tube in the right side  -- PT and OT has added, recommend home health  -- continue iS  -- taper narcotic pain meds   -- palliative following     Discussed the CODE STATUS in [place], patient wanted to be full code are has been discussed with the surgery      9/12:  -- Patient is alert, awake and answering questions appropriately.  This with the patient bytranslator.  --Patient revoked CODE STATUS to full code.   --Patient currently has a chest tube currently to waterseal.  Will obtain a chest x-ray, surgery continue to follow for further recommendation  --chest x-ray showing: Stable tiny right pneumothorax, right pleural effusion, and right basilar airspace disease.   --  HOme health has been ordered  -- o2 reuqirement has been better      9/13:  -- Patient is  alert, awake, answering questions appropriately.  Patient is [place]-speaking.  -- His breathing has been getting better, he is walking hallway.  He is currently monitor of oxygen, does have a chest tube to waterseal.  Surgery following, will follow surgery recommendation, will continue daily chest x-ray  -- need to be oob tid    9/14:  -- Patient  is alert and awake and has been answering questions    -- Patient has been stable, heart rate ha sbeen 75-89  -- Blood work has been reviewed   -- will obtain chest x-ray tomorrow   PT and OT has evaluate the patient, recommend home with residual ordered.  -- discussed the plan of care with ptient in Armenian    I have discussed this patient's plan of care and discharge plan at IDT rounds today with Case Management, Nursing, Nursing leadership, and other members of the IDT team.    Consultants/Specialty  general surgery    Code Status  Full Code    Disposition  The patient is not medically cleared for discharge to home or a post-acute facility.      I have placed the appropriate orders for post-discharge needs.    Review of Systems  Review of Systems   Constitutional:  Negative for chills, diaphoresis, fever and malaise/fatigue.   HENT:  Negative for congestion and sore throat.    Eyes:  Negative for pain and discharge.   Respiratory:  Negative for cough, hemoptysis, sputum production, shortness of breath and wheezing.    Cardiovascular:  Positive for chest pain. Negative for palpitations, claudication and leg swelling.   Gastrointestinal:  Negative for abdominal pain, constipation, diarrhea, melena, nausea and vomiting.   Genitourinary:  Negative for dysuria, frequency and urgency.   Musculoskeletal:  Negative for back pain, joint pain, myalgias and neck pain.   Skin:  Negative for itching and rash.   Neurological:  Negative for dizziness, sensory change, speech change, focal weakness, loss of consciousness, weakness and headaches.   Endo/Heme/Allergies:  Does not  bruise/bleed easily.   Psychiatric/Behavioral:  Negative for depression, substance abuse and suicidal ideas.         Physical Exam  Temp:  [36.2 °C (97.1 °F)-36.9 °C (98.4 °F)] 36.6 °C (97.9 °F)  Pulse:  [67-89] 89  Resp:  [16] 16  BP: (105-114)/(49-65) 108/57  SpO2:  [90 %-96 %] 90 %    Physical Exam  Constitutional:       General: He is not in acute distress.     Appearance: He is not diaphoretic.      Comments: Frail, rib protuberance.   HENT:      Head: Normocephalic and atraumatic.      Mouth/Throat:      Mouth: Mucous membranes are dry.      Pharynx: No oropharyngeal exudate.   Eyes:      General: No scleral icterus.        Right eye: No discharge.         Left eye: No discharge.      Conjunctiva/sclera: Conjunctivae normal.      Pupils: Pupils are equal, round, and reactive to light.   Neck:      Thyroid: No thyromegaly.      Vascular: No JVD.      Trachea: No tracheal deviation.   Cardiovascular:      Rate and Rhythm: Normal rate and regular rhythm.      Heart sounds: Normal heart sounds. No murmur heard.     No friction rub. No gallop.   Pulmonary:      Effort: Pulmonary effort is normal. No respiratory distress.      Breath sounds: Normal breath sounds. No wheezing or rales.      Comments: Right chest tube to suction.  Chest:      Chest wall: No tenderness.   Abdominal:      General: Bowel sounds are normal. There is no distension.      Palpations: Abdomen is soft. There is no mass.      Tenderness: There is no abdominal tenderness. There is no guarding or rebound.   Musculoskeletal:         General: No tenderness. Normal range of motion.      Cervical back: Normal range of motion and neck supple.   Lymphadenopathy:      Cervical: No cervical adenopathy.   Skin:     General: Skin is warm and dry.      Capillary Refill: Capillary refill takes less than 2 seconds.      Findings: No erythema or rash.   Neurological:      General: No focal deficit present.      Mental Status: He is alert and oriented to  person, place, and time.      Cranial Nerves: No cranial nerve deficit.      Motor: No abnormal muscle tone.   Psychiatric:         Mood and Affect: Mood normal.         Behavior: Behavior normal.         Thought Content: Thought content normal.         Judgment: Judgment normal.         Fluids    Intake/Output Summary (Last 24 hours) at 9/14/2024 1402  Last data filed at 9/14/2024 0742  Gross per 24 hour   Intake 120 ml   Output 0 ml   Net 120 ml        Laboratory          Recent Labs     09/12/24  0041 09/13/24  0201 09/14/24  0425   SODIUM 136 137 140   POTASSIUM 4.3 4.1 4.3   CHLORIDE 100 102 103   CO2 27 25 25   GLUCOSE 104* 94 97   BUN 13 18 19   CREATININE 0.35* 0.41* 0.37*   CALCIUM 8.4* 8.7 8.8                     Imaging  DX-CHEST-PORTABLE (1 VIEW)   Final Result      1.  Stable right pleural effusion.      2.  Unchanged position of right chest tube.      3.  Diffuse interstitial parenchymal scarring versus mild fluid overload.      DX-CHEST-PORTABLE (1 VIEW)   Final Result      Stable tiny right pneumothorax, right pleural effusion, and right basilar airspace disease.      DX-CHEST-PORTABLE (1 VIEW)   Final Result      Stable tiny right pneumothorax, right pleural effusion, and right basilar airspace disease.      DX-CHEST-PORTABLE (1 VIEW)   Final Result         1. Tiny, 9 mm right apical pneumothorax.   2. Small amount of right pleural fluid and right basilar airspace disease again noted.   3. Severe emphysema.            [id number] VIEW   Final Result      No evidence of bowel obstruction.                  DX-CHEST-PORTABLE (1 VIEW)   Final Result         1. Tiny, 9 mm right apical pneumothorax.   2. Small amount of right pleural fluid and right basilar airspace disease again noted.   3. Severe emphysema.         DX-CHEST-PORTABLE (1 VIEW)   Final Result         1. No pneumothorax.   2. Small amount of right pleural fluid and right basilar airspace disease again noted.   3. Severe emphysema.       DX-CHEST-PORTABLE (1 VIEW)   Final Result         1. Tiny right apical pneumothorax.   2. Small amount of right pleural fluid and right basilar airspace disease and noted.   3. Severe emphysema.      DX-CHEST-PORTABLE (1 VIEW)   Final Result         1.  Interstitial pulmonary parenchymal prominence suggest chronic underlying lung disease, component of interstitial edema and/or infiltrates not excluded.   2.  Trace right apical pneumothorax, thoracostomy tube in place.   3.  Trace bilateral pleural effusions, stable since prior study.   4.  Atherosclerosis      DX-CHEST-PORTABLE (1 VIEW)   Final Result         1.  Interstitial pulmonary parenchymal prominence suggest chronic underlying lung disease, component of interstitial edema and/or infiltrates not excluded.   2.  Trace bilateral pleural effusions, new since prior study.   3.  Atherosclerosis      DX-CHEST-PORTABLE (1 VIEW)   Final Result         1.  Interstitial pulmonary parenchymal prominence suggest chronic underlying lung disease, component of interstitial edema and/or infiltrates not excluded.      DX-CHEST-PORTABLE (1 VIEW)   Final Result      1.  Presents right chest tube      2.  Tiny right pneumothorax      CT-CHEST (THORAX) WITH   Final Result         1. Interval placement of right chest tube with small residual right pneumothorax.      2. Emphysematous changes with large bullae in the right middle lobe.   3. Patchy right basilar atelectasis.         DX-CHEST-PORTABLE (1 VIEW)   Final Result         No appreciable pneumothorax after right chest tube placement.      DX-CHEST-PORTABLE (1 VIEW)   Final Result      Stable large right pneumothorax.      DX-CHEST-PORTABLE (1 VIEW)   Final Result         Large right pneumothorax with collapsing of the right lung.            CRITICAL RESULT READ BACK: Preliminary findings discussed with and critical read back performed by Dr. VALERIA BOLANOS in the Emergency Department via telephone on 9/3/2024 12:08 PM            Assessment/Plan  * Primary spontaneous pneumothorax- (present on admission)  Assessment & Plan  Spontaneous likely due to bleb from emphysema and prior years of smoking.  S/P chest tube after initial catheter at OSH failed.  Monitor pleurovac + air leak, chest tube placed to  waterseal  Supplemental oxygen to keep SpO2 >89%   -- today his O2 requirement has been getting better, currently on 1 L of oxygen,  chest tube removed    Home O2 eval    Protein-calorie malnutrition, severe (HCC)  Assessment & Plan  Dietary following  Boost tid    Advance care planning  Assessment & Plan  Patient was of sound mind and voluntarily participated in the conversation.  Patient and nursing staff  were present for the conversation.  I discussed advance care planning face to face with the patient for at least 18 minutes, including diagnosis, prognosis, plan of care, risks and benefits of any therapies that could be offered, as well as alternatives including palliation      Patient opted for full code     Dehydration- (present on admission)  Assessment & Plan  Clinically dry mucus membranes, poor skin turgor.  Improving   Encourage good po intake      Hyperglycemia  Assessment & Plan  Monitor labs    Elevated alkaline phosphatase level  Assessment & Plan  Alk Phos:178  Needs outpatient work up.  No current abdominal pain.    Hypokalemia  Assessment & Plan  Replete and monitor    COPD (chronic obstructive pulmonary disease) (HCC)- (present on admission)  Assessment & Plan  No exacerbation  RT per protocol  Monitor SpO2 and titrate oxygen to keep >89%  Nebulizer and inhalers    Leukocytosis  Assessment & Plan  Resolved            VTE prophylaxis: xarelto    I have performed a physical exam and reviewed and updated ROS and Plan today (9/14/2024). In review of yesterday's note (9/13/2024), there are no changes except as documented above.

## 2024-09-14 NOTE — PROGRESS NOTES
Trauma / Surgical Daily Progress Note    Date of Service  9/14/2024    Chief Complaint  84 y.o. male admitted 9/3/2024 with spontaneous pneumothorax. Non-trauma.     9/3 Right tube thoracostomy  9/5 Thoracoscopic talc and abrasive pleurodesis     Interval Events  CXR without pneumothorax.   Chest tube to water seal.     - Chest tube removed without difficultly. Occlusive dressing placed. Patient tolerated well.   - Aggressive pulmonary hygiene.   - IS efforts and PEP.   - Trauma service will sign off.  Please reach out with question or concerns.   - Patient medically cleared per trauma if AM CXR is stable tomorrow.     Review of Systems  Review of Systems   Constitutional:  Negative for chills, fever and malaise/fatigue.   Respiratory:  Negative for cough and shortness of breath.    Cardiovascular:  Negative for chest pain.   Gastrointestinal:  Negative for nausea and vomiting.   Musculoskeletal:  Positive for myalgias. Negative for back pain and neck pain.      Vital Signs  Temp:  [36.2 °C (97.1 °F)-36.9 °C (98.4 °F)] 36.6 °C (97.9 °F)  Pulse:  [67-89] 77  Resp:  [16] 16  BP: (105-117)/(49-65) 114/58  SpO2:  [91 %-96 %] 94 %    Physical Exam  Physical Exam  Vitals and nursing note reviewed.   Constitutional:       General: He is awake. He is not in acute distress.     Appearance: He is not ill-appearing, toxic-appearing or diaphoretic.      Comments: Elderly, frail and deconditioned  Sitting in chair at bedside    Pulmonary:      Effort: Pulmonary effort is normal. No respiratory distress.      Comments: Chest tube to water seal, no air leak.   Chest:      Chest wall: Tenderness present.   Abdominal:      General: There is no distension.      Tenderness: There is no abdominal tenderness. There is no guarding or rebound.   Musculoskeletal:      Comments: Moves all extremities   Skin:     General: Skin is warm and dry.      Capillary Refill: Capillary refill takes less than 2 seconds.   Neurological:      Mental  Status: He is alert.      GCS: GCS eye subscore is 4. GCS verbal subscore is 5. GCS motor subscore is 6.   Psychiatric:         Behavior: Behavior is cooperative.         Laboratory  Recent Results (from the past 24 hour(s))   Basic Metabolic Panel    Collection Time: 09/14/24  4:25 AM   Result Value Ref Range    Sodium 140 135 - 145 mmol/L    Potassium 4.3 3.6 - 5.5 mmol/L    Chloride 103 96 - 112 mmol/L    Co2 25 20 - 33 mmol/L    Glucose 97 65 - 99 mg/dL    Bun 19 8 - 22 mg/dL    Creatinine 0.37 (L) 0.50 - 1.40 mg/dL    Calcium 8.8 8.5 - 10.5 mg/dL    Anion Gap 12.0 7.0 - 16.0   ESTIMATED GFR    Collection Time: 09/14/24  4:25 AM   Result Value Ref Range    GFR (CKD-EPI) 110 >60 mL/min/1.73 m 2       Fluids    Intake/Output Summary (Last 24 hours) at 9/14/2024 1054  Last data filed at 9/14/2024 0742  Gross per 24 hour   Intake 240 ml   Output 204 ml   Net 36 ml     Assessment/Plan  * Primary spontaneous pneumothorax- (present on admission)  Assessment & Plan  Large spontaneous pneumothorax   Attempted decompression in ED.  Chest tube placed in ED. (+) air leak noted/no drainage. Placed to suction.  9/4 CXR with no pneumothorax.  CT to suction with no air leak.  Continue chest tube to suction.    9/5 OR for thoracoscopic talc and abrasive pleurodesis. Bleb resection not indicated.   9/9 Chest tube to water seal. No pneumothorax on morning CXR.  9/10 Small 9mm pneumothorax.  -Place chest tube back to suction.   -PEP therapy.  9/12 Chest tube to water seal.   9/14 Chest tube removed.   - AM CXR for medical clearance to discharge.   Trauma service will sign off.     Trauma- (present on admission)  Assessment & Plan  Denies trauma      Discussed patient condition with RN, Patient, and trauma surgery. Demetrius Mccray MD

## 2024-09-15 ENCOUNTER — APPOINTMENT (OUTPATIENT)
Dept: RADIOLOGY | Facility: MEDICAL CENTER | Age: 84
DRG: 166 | End: 2024-09-15
Attending: HOSPITALIST
Payer: MEDICARE

## 2024-09-15 ENCOUNTER — PHARMACY VISIT (OUTPATIENT)
Dept: PHARMACY | Facility: MEDICAL CENTER | Age: 84
End: 2024-09-15
Payer: COMMERCIAL

## 2024-09-15 VITALS
HEIGHT: 66 IN | WEIGHT: 107.14 LBS | RESPIRATION RATE: 16 BRPM | DIASTOLIC BLOOD PRESSURE: 67 MMHG | SYSTOLIC BLOOD PRESSURE: 114 MMHG | BODY MASS INDEX: 17.22 KG/M2 | TEMPERATURE: 98.6 F | HEART RATE: 100 BPM | OXYGEN SATURATION: 89 %

## 2024-09-15 LAB
ANION GAP SERPL CALC-SCNC: 10 MMOL/L (ref 7–16)
BUN SERPL-MCNC: 19 MG/DL (ref 8–22)
CALCIUM SERPL-MCNC: 8.5 MG/DL (ref 8.5–10.5)
CHLORIDE SERPL-SCNC: 103 MMOL/L (ref 96–112)
CO2 SERPL-SCNC: 26 MMOL/L (ref 20–33)
CREAT SERPL-MCNC: 0.36 MG/DL (ref 0.5–1.4)
GFR SERPLBLD CREATININE-BSD FMLA CKD-EPI: 111 ML/MIN/1.73 M 2
GLUCOSE SERPL-MCNC: 91 MG/DL (ref 65–99)
POTASSIUM SERPL-SCNC: 4 MMOL/L (ref 3.6–5.5)
SODIUM SERPL-SCNC: 139 MMOL/L (ref 135–145)

## 2024-09-15 PROCEDURE — RXMED WILLOW AMBULATORY MEDICATION CHARGE: Performed by: HOSPITALIST

## 2024-09-15 PROCEDURE — 700102 HCHG RX REV CODE 250 W/ 637 OVERRIDE(OP): Performed by: HOSPITALIST

## 2024-09-15 PROCEDURE — 80048 BASIC METABOLIC PNL TOTAL CA: CPT

## 2024-09-15 PROCEDURE — A9270 NON-COVERED ITEM OR SERVICE: HCPCS | Performed by: HOSPITALIST

## 2024-09-15 PROCEDURE — 71045 X-RAY EXAM CHEST 1 VIEW: CPT

## 2024-09-15 PROCEDURE — 36415 COLL VENOUS BLD VENIPUNCTURE: CPT

## 2024-09-15 PROCEDURE — 99239 HOSP IP/OBS DSCHRG MGMT >30: CPT | Performed by: HOSPITALIST

## 2024-09-15 RX ORDER — KETOROLAC TROMETHAMINE 5 MG/ML
1 SOLUTION OPHTHALMIC
Qty: 10 ML | Refills: 0 | Status: SHIPPED | OUTPATIENT
Start: 2024-09-15 | End: 2024-10-15

## 2024-09-15 RX ORDER — PREDNISOLONE ACETATE 10 MG/ML
1 SUSPENSION/ DROPS OPHTHALMIC
Qty: 5 ML | Refills: 0 | Status: SHIPPED | OUTPATIENT
Start: 2024-09-15 | End: 2024-10-15

## 2024-09-15 RX ORDER — ALBUTEROL SULFATE 90 UG/1
2 INHALANT RESPIRATORY (INHALATION) EVERY 6 HOURS PRN
Qty: 8.5 G | Refills: 0 | Status: SHIPPED | OUTPATIENT
Start: 2024-09-15

## 2024-09-15 RX ORDER — TIOTROPIUM BROMIDE 18 UG/1
18 CAPSULE ORAL; RESPIRATORY (INHALATION) DAILY
Qty: 30 CAPSULE | Refills: 3 | Status: SHIPPED | OUTPATIENT
Start: 2024-09-15

## 2024-09-15 RX ADMIN — KETOROLAC TROMETHAMINE 1 DROP: 0.5 SOLUTION OPHTHALMIC at 04:38

## 2024-09-15 RX ADMIN — PREDNISOLONE ACETATE 1 DROP: 10 SUSPENSION/ DROPS OPHTHALMIC at 04:38

## 2024-09-15 RX ADMIN — HYDROCODONE BITARTRATE AND ACETAMINOPHEN 1 TABLET: 5; 325 TABLET ORAL at 12:16

## 2024-09-15 ASSESSMENT — PAIN DESCRIPTION - PAIN TYPE
TYPE: ACUTE PAIN

## 2024-09-15 NOTE — CARE PLAN
The patient is Stable - Low risk of patient condition declining or worsening    Shift Goals  Clinical Goals: ambulate with oxymetry, mobility  Patient Goals: rest, comfort  Family Goals: comfort    Progress made toward(s) clinical / shift goals:        Problem: Knowledge Deficit - Standard  Goal: Patient and family/care givers will demonstrate understanding of plan of care, disease process/condition, diagnostic tests and medications  Outcome: Progressing     Problem: Pain - Standard  Goal: Alleviation of pain or a reduction in pain to the patient’s comfort goal  Outcome: Progressing     Problem: Mobility  Goal: Patient's capacity to carry out activities will improve  Outcome: Progressing     Problem: Fall Risk  Goal: Patient will remain free from falls  Outcome: Progressing       Patient is not progressing towards the following goals:

## 2024-09-15 NOTE — CARE PLAN
The patient is Stable - Low risk of patient condition declining or worsening    Shift Goals  Clinical Goals: monitor chest tube, increase mobility, pain control  Patient Goals: rest  Family Goals: n/a    Progress made toward(s) clinical / shift goals:  chest tube removed today, encouraging pulmonary hygiene using IS, ambulating hallways with 1 assist and FWW. Denies pain this shift.  Continues to require 1LO2.   Pt moderate fall risk, precautions in place (door sign, yellow armband, bed and chair alarms on, pt calls appropriately, call light in reach)    Patient is not progressing towards the following goals:

## 2024-09-15 NOTE — DISCHARGE PLANNING
1048  VIELKA sent O2 order to [facility]  Notified CM via Teams    1052AM  Spoke with Dav @ [facility] [phone] stated to hard fax to [phone]/Faxed DME order he will review and any questions he will call otherwise he will deliver    1122  Received a call from Dav @ [facility] delivery of O2 by 2pm today    1307  Dav from [facility] called and said Manager declined this request due to distance to [place]/Will send to [facility] hard faxed [phone]    Notified CM via Teams    1323PM  Per Giuliano Oneill will be @ Bedside within 15 minutes for O2 delivery and will service Nirav    Notified CM via Teams

## 2024-09-15 NOTE — CARE PLAN
The patient is Stable - Low risk of patient condition declining or worsening    Shift Goals  Clinical Goals: discharge with home O2 today  Patient Goals: rest, comfort  Family Goals: comfort    Progress made toward(s) clinical / shift goals:  patient discharging with home O2 and follow up needs    Patient is not progressing towards the following goals:

## 2024-09-15 NOTE — DISCHARGE PLANNING
Case Management Discharge Planning    Admission Date: 9/3/2024  GMLOS: 8.4  ALOS: 12    6-Clicks ADL Score: 19  6-Clicks Mobility Score: 21    Anticipated Discharge Dispo: Discharge Disposition: D/T to home under [facility] care in anticipation of covered skilled care (06)    DME Needed: Yes    DME Ordered: Yes    Action(s) Taken: OTHER  LSW requested follow up from [facility]KA with 02 as order is in Pt is medically cleared.    Escalations Completed: None    Medically Clear: Yes    Next Steps: Waiting for O2 delivery at bedside    Barriers to Discharge: Oxygen Delivery     Addendum  Update IMM signed at 12 noon faxed to [facility]   @[id number] LSW and RN spoke with pt and daughter  at bedside regarding O2 follow up with [facility] or [facility] clinic to establish with PCP while Pt is in [place].

## 2024-09-15 NOTE — DISCHARGE SUMMARY
Discharge Summary    CHIEF COMPLAINT ON ADMISSION  Chief Complaint   Patient presents with    Shortness of Breath    Respiratory Distress       Reason for Admission  EMS     Admission Date  9/3/2024    CODE STATUS  Full Code    HPI & HOSPITAL COURSE  This 84-year-old male with a past medical significant for COPD, prior history of smoking, reports quit drinking 2 years ago presented to the ER on 9/3 at [facility] after he was noted to be acutely short of breath, found to have right-sided pneumothorax.  He was transferred to [facility] after chest tube placement.    Patient denies any trauma likely spontaneous pneumothorax.  During the stay in the hospital, he continued to remain on 5 to 6 L of oxygen, he stated that he does have fatigue chest pain on the right side he is very hard of hearing.    Chest x-ray obtained on 9/10 showed 9 mm pneumothorax, chest tube was placed on waterseal.  Chest tube has been removed by surgery on 9/14, home O2 eval was obtained, patient be discharged home on home oxygen along with home health.    Plan of care has been discussed the patient and family in [place].   At this time, for all the other chronic medical condition; he will resume his home medication.    Therefore, he is discharged in good and stable condition to home with organized home healthcare and close outpatient follow-up.    The patient met 2-midnight criteria for an inpatient stay at the time of discharge.    Discharge Date  9/15/2024      FOLLOW UP ITEMS POST DISCHARGE  Please follow up with pcp as an op       DISCHARGE DIAGNOSES  Principal Problem:    Primary spontaneous pneumothorax (POA: Yes)  Active Problems:    Trauma (POA: Yes)    Leukocytosis (POA: Unknown)    COPD (chronic obstructive pulmonary disease) (HCC) (POA: Yes)    Hypokalemia (POA: Unknown)    Elevated alkaline phosphatase level (POA: Unknown)    Hyperglycemia (POA: Unknown)    Dehydration (POA: Yes)    Advance care planning (POA: Unknown)     Protein-calorie malnutrition, severe (HCC) (POA: Unknown)  Resolved Problems:    * No resolved hospital problems. *      FOLLOW UP  No future appointments.  No follow-up provider specified.    MEDICATIONS ON DISCHARGE     Medication List        START taking these medications        Instructions   albuterol 108 (90 Base) MCG/ACT Aers inhalation aerosol   Inhale 2 Puffs every 6 hours as needed for Shortness of Breath.  Dose: 2 Puff     mometasone-formoterol 100-5 MCG/ACT Aero  Commonly known as: Dulera   Inhale 2 Puffs 2 times a day for 30 days.  Dose: 2 Puff     tiotropium 18 MCG Caps  Commonly known as: Spiriva HandiHaler   Place 1 Capsule into inhaler and inhale every day.  Dose: 18 mcg            CONTINUE taking these medications        Instructions   ketorolac 0.5 % Soln  Commonly known as: Acular   Administer 1 Drop into both eyes every day for 30 days.  Dose: 1 Drop     prednisoLONE acetate 1 % Susp  Commonly known as: Pred Forte   Administer 1 Drop into both eyes every day for 30 days.  Dose: 1 Drop              Allergies  Not on File    DIET  Orders Placed This Encounter   Procedures    Diet Order Diet: Regular     Standing Status:   Standing     Number of Occurrences:   1     Order Specific Question:   Diet:     Answer:   Regular [1]       ACTIVITY  As tolerated.  Weight bearing as tolerated    CONSULTATIONS  Surgery     PROCEDURES  Chest tube placement     LABORATORY  Lab Results   Component Value Date    SODIUM 139 09/15/2024    POTASSIUM 4.0 09/15/2024    CHLORIDE 103 09/15/2024    CO2 26 09/15/2024    GLUCOSE 91 09/15/2024    BUN 19 09/15/2024    CREATININE 0.36 (L) 09/15/2024        Lab Results   Component Value Date    WBC 7.0 09/11/2024    HEMOGLOBIN 11.8 (L) 09/11/2024    HEMATOCRIT 35.7 (L) 09/11/2024    PLATELETCT 279 09/11/2024        Total time of the discharge process exceeds 39 minutes.

## 2024-09-15 NOTE — FACE TO FACE
"Face to Face Note  -  Durable Medical Equipment    Alejandra Bird M.D. - NPI: [id number]  I certify that this patient is under my care and that they had a durable medical equipment(DME)face to face encounter by myself that meets the physician DME face-to-face encounter requirements with this patient on:    Date of encounter:   Patient:                    MRN:                       YOB: 2023  Joaquín Hartman  [id number]  1940     The encounter with the patient was in whole, or in part, for the following medical condition, which is the primary reason for durable medical equipment:  COPD    I certify that, based on my findings, the following durable medical equipment is medically necessary:    Oxygen   HOME O2 Saturation Measurements:(Values must be present for Home Oxygen orders)  Room air sat at rest: 90%  Room air sat with amb: 87%  With liters of O2: 1L, O2 sat at rest with O2: 93%  With Liters of O2: 2L, O2 sat with amb with O2 : 92  Is the patient mobile?: Yes  If patient feels more short of breath, they can go up to 6 liters per minute and contact healthcare provider.    Supporting Symptoms: The patient requires supplemental oxygen, as the following interventions have been tried with limited or no improvement: \"Bronchodilators and/or steroid inhalers.    My Clinical findings support the need for the above equipment due to:  Hypoxia  "

## 2024-09-21 ENCOUNTER — HOSPITAL ENCOUNTER (OUTPATIENT)
Dept: RADIOLOGY | Facility: MEDICAL CENTER | Age: 84
End: 2024-09-21
Attending: PHYSICIAN ASSISTANT
Payer: MEDICARE

## 2024-09-21 DIAGNOSIS — J93.11 PRIMARY SPONTANEOUS PNEUMOTHORAX: ICD-10-CM

## 2024-09-21 PROCEDURE — 71046 X-RAY EXAM CHEST 2 VIEWS: CPT

## 2024-09-24 ENCOUNTER — OFFICE VISIT (OUTPATIENT)
Dept: SURGERY | Facility: MEDICAL CENTER | Age: 84
End: 2024-09-24
Attending: SURGERY
Payer: MEDICARE

## 2024-09-24 VITALS
WEIGHT: 105 LBS | DIASTOLIC BLOOD PRESSURE: 83 MMHG | BODY MASS INDEX: 16.87 KG/M2 | OXYGEN SATURATION: 92 % | SYSTOLIC BLOOD PRESSURE: 120 MMHG | HEART RATE: 93 BPM | RESPIRATION RATE: 16 BRPM

## 2024-09-24 DIAGNOSIS — J93.11 PRIMARY SPONTANEOUS PNEUMOTHORAX: ICD-10-CM

## 2024-09-24 PROCEDURE — 99024 POSTOP FOLLOW-UP VISIT: CPT | Performed by: SURGERY

## 2024-09-24 ASSESSMENT — FIBROSIS 4 INDEX: FIB4 SCORE: 1.67

## 2024-09-24 NOTE — PROGRESS NOTES
HISTORY OF PRESENT ILLNESS: The patient is an 84 year-old elderly man who returns to the [facility] for routine follow-up after undergoing a thoracoscopic talc and mechanical pleurodesis for spontaneous pneumothorax on 9/5/24 by Demetrius Mccray MD. Since discharge he has been doing well. He denies fevers, chills, nausea, vomiting, dyspnea. He is on his baseline home oxygen.    CURRENT MEDICATIONS:  Current Outpatient Medications   Medication Sig    prednisoLONE acetate (PRED FORTE) 1 % Suspension Administer 1 Drop into both eyes every day for 30 days.    ketorolac (ACULAR) 0.5 % Solution Administer 1 Drop into both eyes every day for 30 days.    tiotropium (SPIRIVA HANDIHALER) 18 MCG Cap Place 1 Capsule into inhaler and inhale every day.    mometasone-formoterol (DULERA) 100-5 MCG/ACT Aerosol Inhale 2 Puffs 2 times a day for 30 days.    albuterol 108 (90 Base) MCG/ACT Aero Soln inhalation aerosol Inhale 2 Puffs every 6 hours as needed for Shortness of Breath.       PHYSICAL EXAMINATION:  Vital Signs: /83 (BP Location: Left arm, Patient Position: Sitting)   Pulse 93   Resp 16   Wt 47.6 kg (105 lb)   SpO2 92%   Physical Exam  Vitals and nursing note reviewed.   Constitutional:       General: He is not in acute distress.     Appearance: He is not toxic-appearing.      Interventions: Nasal cannula in place.   HENT:      Head: Normocephalic and atraumatic.      Right Ear: External ear normal.      Left Ear: External ear normal.      Nose: Nose normal.      Mouth/Throat:      Pharynx: Oropharynx is clear.   Eyes:      General: No scleral icterus.     Pupils: Pupils are equal, round, and reactive to light.   Cardiovascular:      Rate and Rhythm: Normal rate.   Pulmonary:      Effort: Pulmonary effort is normal. No respiratory distress.   Chest:      Comments: Right chest tube site healing well. Port site with staples in place, well-approximated.  Abdominal:      General: There is no  distension.      Palpations: Abdomen is soft.      Tenderness: There is no abdominal tenderness. There is no guarding or rebound.   Musculoskeletal:      Cervical back: Normal range of motion and neck supple.   Skin:     General: Skin is warm and dry.      Capillary Refill: Capillary refill takes less than 2 seconds.      Coloration: Skin is not jaundiced.   Neurological:      Mental Status: He is alert.         LABORATORY VALUES:  Lab Results   Component Value Date/Time    WBC 7.0 09/11/2024 06:24 AM    RBC 3.63 (L) 09/11/2024 06:24 AM    HEMOGLOBIN 11.8 (L) 09/11/2024 06:24 AM    HEMATOCRIT 35.7 (L) 09/11/2024 06:24 AM    MCV 98.3 (H) 09/11/2024 06:24 AM    MCH 32.5 09/11/2024 06:24 AM    MCHC 33.1 09/11/2024 06:24 AM    MPV 9.4 09/11/2024 06:24 AM    NEUTSPOLYS 74.40 (H) 09/07/2024 03:32 AM    LYMPHOCYTES 15.90 (L) 09/07/2024 03:32 AM    MONOCYTES 9.00 09/07/2024 03:32 AM    EOSINOPHILS 0.20 09/07/2024 03:32 AM    BASOPHILS 0.20 09/07/2024 03:32 AM     Lab Results   Component Value Date/Time    SODIUM 139 09/15/2024 01:09 AM    POTASSIUM 4.0 09/15/2024 01:09 AM    CHLORIDE 103 09/15/2024 01:09 AM    CO2 26 09/15/2024 01:09 AM    GLUCOSE 91 09/15/2024 01:09 AM    BUN 19 09/15/2024 01:09 AM    CREATININE 0.36 (L) 09/15/2024 01:09 AM     Lab Results   Component Value Date/Time    PROTHROMBTM 17.2 (H) 09/05/2024 03:01 AM    INR 1.38 (H) 09/05/2024 03:01 AM       IMAGING:  No orders to display   Chest-xray from 9/23/24 reviewed with patient: no recurrent pneumothorax, good expansion of right lung.    ASSESSMENT AND PLAN:  1. Primary spontaneous pneumothorax  Doing well post-operatively, chest x-ray with good expansion of right lung.  Staples removed at bedside.    Final postoperative instructions provided. Discharge from the [facility].       ____________________________________     Delmer Castillo M.D.    DD: 9/24/2024  2:13 PM

## (undated) DEVICE — TUBING CLEARLINK DUO-VENT - C-FLO (48EA/CA)

## (undated) DEVICE — SET EXTENSION WITH 2 PORTS (48EA/CA) ***PART #2C8610 IS A SUBSTITUTE*****

## (undated) DEVICE — SYSTEM CHEST DRAIN ADULT/PEDS W/AUTO TRANSFUSION CAPABILITY SAHARA (6EA/CA)

## (undated) DEVICE — STAPLER 35MM SKIN WIDE ROTATING HEAD (6EA/BX)

## (undated) DEVICE — GLOVE BIOGEL SZ 7.5 SURGICAL PF LTX - (50PR/BX 4BX/CA)

## (undated) DEVICE — CATHETER TROCAR 24FR - (10/CA)

## (undated) DEVICE — DRAPE CHEST/BREAST - (12EA/CA)

## (undated) DEVICE — CHLORAPREP 26 ML APPLICATOR - ORANGE TINT(25/CA)

## (undated) DEVICE — COVER LIGHT HANDLE ALC PLUS DISP (18EA/BX)

## (undated) DEVICE — CONNECTOR Y TBG CRTY 5 IN 1 STERILE (50EA/CA)

## (undated) DEVICE — DRAPESURG STERI-DRAPE LONG - (10/BX 4BX/CA)

## (undated) DEVICE — SUTURE GENERAL

## (undated) DEVICE — APPLICATOR ENDOSCOPIC SURGICEL (5EA/BX)

## (undated) DEVICE — ANTI-FOG SOLUTION - 60BTL/CA

## (undated) DEVICE — TROCAR Z THREAD12MM OPTICAL - NON BLADED (6/BX)

## (undated) DEVICE — SET SUCTION/IRRIGATION WITH DISPOSABLE TIP (6/CA )PART #0250-070-520 IS A SUB

## (undated) DEVICE — LACTATED RINGERS INJ 1000 ML - (14EA/CA 60CA/PF)

## (undated) DEVICE — SENSOR OXIMETER ADULT SPO2 RD SET (20EA/BX)

## (undated) DEVICE — APPLICATOR COTTON TIP 6 IN - STERILE (2EA/PK 100PK/BX)

## (undated) DEVICE — SUTURE 0 ETHIBOND CT-1 - (12/BX) 18 INCH

## (undated) DEVICE — SUTURE 2-0 SILK SH (36PK/BX)

## (undated) DEVICE — TUBING LAPAROSCOPIC PLUME DEVICE (10EA/CA)

## (undated) DEVICE — PACK MAJOR BASIN - (2EA/CA)

## (undated) DEVICE — SLEEVE, VASO, THIGH, MED

## (undated) DEVICE — NEEDLE NON SAFETY HYPO 22 GA X 1 1/2 IN (100/BX)

## (undated) DEVICE — SOD. CHL. INJ. 0.9% 1000 ML - (14EA/CA 60CA/PF)

## (undated) DEVICE — GOWN WARMING STANDARD FLEX - (30/CA)

## (undated) DEVICE — Device

## (undated) DEVICE — TROCAR Z THREAD11MM OPTICAL - NON BLADED(6/BX)

## (undated) DEVICE — SODIUM CHL IRRIGATION 0.9% 1000ML (12EA/CA)

## (undated) DEVICE — SET TUBING PNEUMOCLEAR HIGH FLOW SMOKE EVACUATION (10EA/BX)

## (undated) DEVICE — MEDICINE CUP STERILE 2 OZ - (100/CA)

## (undated) DEVICE — SYSTEM CLEARIFY VISUALIZATION (10EA/PK)

## (undated) DEVICE — SPONGE GAUZESTER 4 X 4 4PLY - (128PK/CA)

## (undated) DEVICE — TROCAR SEPARATOR 5X55 - 6/BX

## (undated) DEVICE — ELECTRODE DUAL RETURN W/ CORD - (50/PK)

## (undated) DEVICE — SYRINGE 10 ML CONTROL LL (25EA/BX 4BX/CA)

## (undated) DEVICE — SUCTION INSTRUMENT YANKAUER BULBOUS TIP W/O VENT (50EA/CA)

## (undated) DEVICE — SET LEADWIRE 5 LEAD BEDSIDE DISPOSABLE ECG (1SET OF 5/EA)

## (undated) DEVICE — CANISTER SUCTION 3000ML MECHANICAL FILTER AUTO SHUTOFF MEDI-VAC NONSTERILE LF DISP (40EA/CA)

## (undated) DEVICE — STAPLER SKIN DISP - (6/BX 10BX/CA) VISISTAT

## (undated) DEVICE — SPONGE XRAY 8X4 STERL. 12PL - (10EA/TY 80TY/CA)

## (undated) DEVICE — BLADE SURGICAL #15 - (50/BX 3BX/CA)